# Patient Record
Sex: FEMALE | Race: WHITE | NOT HISPANIC OR LATINO | Employment: FULL TIME | URBAN - METROPOLITAN AREA
[De-identification: names, ages, dates, MRNs, and addresses within clinical notes are randomized per-mention and may not be internally consistent; named-entity substitution may affect disease eponyms.]

---

## 2022-01-13 ENCOUNTER — VIRTUAL VISIT (OUTPATIENT)
Dept: FAMILY MEDICINE | Facility: CLINIC | Age: 27
End: 2022-01-13
Payer: COMMERCIAL

## 2022-01-13 DIAGNOSIS — Z76.89 ENCOUNTER TO ESTABLISH CARE: ICD-10-CM

## 2022-01-13 DIAGNOSIS — J45.30 MILD PERSISTENT ASTHMA WITHOUT COMPLICATION: ICD-10-CM

## 2022-01-13 DIAGNOSIS — G47.00 INSOMNIA, UNSPECIFIED TYPE: ICD-10-CM

## 2022-01-13 DIAGNOSIS — F32.A DEPRESSION, UNSPECIFIED DEPRESSION TYPE: ICD-10-CM

## 2022-01-13 DIAGNOSIS — M54.42 MIDLINE LOW BACK PAIN WITH LEFT-SIDED SCIATICA, UNSPECIFIED CHRONICITY: Primary | ICD-10-CM

## 2022-01-13 PROCEDURE — 99203 OFFICE O/P NEW LOW 30 MIN: CPT | Mod: 95 | Performed by: FAMILY MEDICINE

## 2022-01-13 RX ORDER — ALBUTEROL SULFATE 90 UG/1
2 AEROSOL, METERED RESPIRATORY (INHALATION) EVERY 6 HOURS
COMMUNITY
End: 2022-01-13

## 2022-01-13 RX ORDER — TRAZODONE HYDROCHLORIDE 50 MG/1
1 TABLET, FILM COATED ORAL
COMMUNITY
End: 2022-01-13

## 2022-01-13 RX ORDER — MONTELUKAST SODIUM 10 MG/1
1 TABLET ORAL DAILY
COMMUNITY
End: 2022-01-13

## 2022-01-13 RX ORDER — CITALOPRAM HYDROBROMIDE 20 MG/1
1 TABLET ORAL DAILY
COMMUNITY
End: 2022-01-13

## 2022-01-13 ASSESSMENT — ANXIETY QUESTIONNAIRES
7. FEELING AFRAID AS IF SOMETHING AWFUL MIGHT HAPPEN: NOT AT ALL
1. FEELING NERVOUS, ANXIOUS, OR ON EDGE: NOT AT ALL
2. NOT BEING ABLE TO STOP OR CONTROL WORRYING: NOT AT ALL
6. BECOMING EASILY ANNOYED OR IRRITABLE: NOT AT ALL
3. WORRYING TOO MUCH ABOUT DIFFERENT THINGS: NOT AT ALL
5. BEING SO RESTLESS THAT IT IS HARD TO SIT STILL: NOT AT ALL
IF YOU CHECKED OFF ANY PROBLEMS ON THIS QUESTIONNAIRE, HOW DIFFICULT HAVE THESE PROBLEMS MADE IT FOR YOU TO DO YOUR WORK, TAKE CARE OF THINGS AT HOME, OR GET ALONG WITH OTHER PEOPLE: NOT DIFFICULT AT ALL
GAD7 TOTAL SCORE: 0

## 2022-01-13 ASSESSMENT — PATIENT HEALTH QUESTIONNAIRE - PHQ9
SUM OF ALL RESPONSES TO PHQ QUESTIONS 1-9: 0
5. POOR APPETITE OR OVEREATING: NOT AT ALL

## 2022-01-13 NOTE — PROGRESS NOTES
Ailin is a 26 year old who is being evaluated via a billable video visit.      How would you like to obtain your AVS? MyChart  If the video visit is dropped, the invitation should be resent by: Text to cell phone: [phone]  Will anyone else be joining your video visit? No    Video Start Time: Not recorded    Assessment & Plan     ICD-10-CM    1. Midline low back pain with left-sided sciatica, unspecified chronicity  M54.42 cyclobenzaprinetaminophen (FLEXERIL) 10 MG ED starter pack     Physical Therapy Referral   2. Mild persistent asthma without complication  J45.30 montelukast (SINGULAIR) 10 MG tablet     albuterol (PROAIR HFA/PROVENTIL HFA/VENTOLIN HFA) 108 (90 Base) MCG/ACT inhaler   3. Depression, unspecified depression type  F32.A citalopram (CELEXA) 20 MG tablet     traZODone (DESYREL) 50 MG tablet   4. Insomnia, unspecified type  G47.00 traZODone (DESYREL) 50 MG tablet   5. Encounter to establish care  Z76.89      Medical history making: Patient is here today to establish care.  [facility] the [facility]  She had insurance under maiden name so records are not available in Care Everywhere.  She needs refill of her medication.  She uses Flexeril sparingly for upper back pain which she calls a stiff upper back muscle spasm.  Discussed the nature of muscle relaxant and possible dependence.  Limited refills are given.  However, would like her to establish physical therapy and work on back strengthening exercises and he is agreeable and referral has been done.  Regarding her asthma, she uses albuterol once or twice a week.  She recalls me that Flovent has never helped her.  Discussed that if she continues to use albuterol regularly, need to be your maintenance medication and we can try Advair or other combination.  She is agreeable.  Currently she feels well regarding her depression and has no acute concerns.  She feels stable on Celexa and would like to continue and this is being  refilled for her.         MEDICATIONS:  Continue current medications without change  See Patient Instructions    Return in about 6 months (around 7/13/2022) for Follow up, Routine preventive.    Nilton Pantoja MD  [facility]    Subjective    Chief Complaint   Patient presents with     Establish Care     Back Pain     Hx of sciatica.  Has been having an increase in a burning in back and down both legs.  She does wear a back brace with some relief.  Also does stretching, sleeps with a pillow between her legs.  Is there anything else that can be done?        Ailin is a 26 year old who presents for the following health issues     No past medical history on file.  No past surgical history on file.      Review of Systems   Constitutional, HEENT, cardiovascular, pulmonary, gi and gu systems are negative, except as otherwise noted.      Objective           Vitals:  No vitals were obtained today due to virtual visit.    Physical Exam   GENERAL: Healthy, alert and no distress  EYES: Eyes grossly normal to inspection.  No discharge or erythema, or obvious scleral/conjunctival abnormalities.  RESP: No audible wheeze, cough, or visible cyanosis.  No visible retractions or increased work of breathing.    SKIN: Visible skin clear. No significant rash, abnormal pigmentation or lesions.  NEURO: Cranial nerves grossly intact.  Mentation and speech appropriate for age.  PSYCH: Mentation appears normal, affect normal/bright, judgement and insight intact, normal speech and appearance well-groomed.    Will [facility] records from [facility]        Video-Visit Details    Type of service:  Video Visit    Video End Time:not recorded    Originating Location (pt. Location): Home    Distant Location (provider location):  [facility]     Platform used for Video Visit: Digital Lifeboat

## 2022-01-14 ASSESSMENT — ANXIETY QUESTIONNAIRES: GAD7 TOTAL SCORE: 0

## 2022-01-16 PROBLEM — F32.A DEPRESSION, UNSPECIFIED DEPRESSION TYPE: Status: ACTIVE | Noted: 2022-01-16

## 2022-01-16 PROBLEM — M54.42 MIDLINE LOW BACK PAIN WITH LEFT-SIDED SCIATICA, UNSPECIFIED CHRONICITY: Status: ACTIVE | Noted: 2022-01-16

## 2022-01-16 PROBLEM — G47.00 INSOMNIA, UNSPECIFIED TYPE: Status: ACTIVE | Noted: 2022-01-16

## 2022-01-16 RX ORDER — CITALOPRAM HYDROBROMIDE 20 MG/1
20 TABLET ORAL DAILY
Qty: 90 TABLET | Refills: 3 | Status: SHIPPED | OUTPATIENT
Start: 2022-01-16 | End: 2022-03-23

## 2022-01-16 RX ORDER — TRAZODONE HYDROCHLORIDE 50 MG/1
50 TABLET, FILM COATED ORAL
Qty: 90 TABLET | Refills: 3 | Status: SHIPPED | OUTPATIENT
Start: 2022-01-16 | End: 2022-03-23

## 2022-01-16 RX ORDER — ALBUTEROL SULFATE 90 UG/1
2 AEROSOL, METERED RESPIRATORY (INHALATION) EVERY 6 HOURS
Qty: 18 G | Refills: 3 | Status: SHIPPED | OUTPATIENT
Start: 2022-01-16 | End: 2022-10-12

## 2022-01-16 RX ORDER — MONTELUKAST SODIUM 10 MG/1
1 TABLET ORAL DAILY
Qty: 90 TABLET | Refills: 3 | Status: SHIPPED | OUTPATIENT
Start: 2022-01-16 | End: 2022-03-23

## 2022-01-17 NOTE — PATIENT INSTRUCTIONS
Patient Education     Asthma  What is asthma?  Asthma is a long-term (chronic) ?lung disease. The airways react to triggers (allergens and irritants). This makes it hard to breathe. With exposure to triggers, changes can occur such as:     The airways become swollen and inflamed.    The muscles around the airways tighten.    More mucus is made. This leads to mucus plugs.  All of these changes make the airways narrow. This makes it hard for air to go out of the lungs. And fresh oxygen can't get into the body.   What causes asthma?  Experts don't know the exact cause of asthma. They believe it is partly inherited. The environment, infections, and chemicals released by the body also play a role.   Exercise causes symptoms in many people with asthma. Symptoms can occur during exercise. They can also occur right after exercise. In some people, stress or strong feelings can cause symptoms.   All of these may be asthma triggers:   Allergens Respiratory problem         Pollens (trees, grasses, and weeds)    Mold    Pets    Dust and dust mites    Cockroaches    Mice       Nasal allergies    Sinus infections    The flu    Viral infections, including the common cold   Irritants Medicines         Strong odors from perfumes, , cooking, paints, and varnishes    Chemicals (gases, fumes)    Air pollution    Changing weather (temperature, barometric pressure, humidity, and strong winds)    Smoke (tobacco-inhaled or secondhand)       Aspirin    NSAIDs (nonsteroidal anti-inflammatory drugs) such as ibuprofen   Other conditions Other         GERD (gastroesophageal reflux)    Sleep apnea    Overweight    Depression       Exercise, especially in cold weather    Strong feelings that go along with laughing or crying       Who is at risk for asthma?  It is most common in:     Children and teens ages 5 to17    People living in cities  Other factors include:    Personal or family history of asthma or allergies    Exposure to  secondhand tobacco smoke    Children with a family history of asthma    Children who have allergies or atopic dermatitis    Children exposed to secondhand and tobacco smoke    Living in areas with high levels of environmental air pollution  What are the symptoms of asthma?  Symptoms include:    Trouble breathing or shortness of breath    Chest tightness    Wheezing or a whistling sound when breathing    Coughing    Breathing becomes harder and may hurt    Talking and sleeping may be harder with severe symptoms  How is asthma diagnosed?  Your healthcare provider will ask about your health history. They will give you a physical exam. You will also have other tests. An important test is spirometry.   A spirometer is a device used to find out how well the lungs are working. It measures the amount and speed of air breathed out.   You may have other tests. These are done to check for conditions such as allergies.   How is asthma treated?  Treatment will depend on your symptoms, age, and general health. It will also depend on how severe the condition is.   There is no cure for asthma. It can often be controlled by staying away from triggers. And by taking medicines as prescribed by your healthcare provider.   Watching for symptoms and taking early, appropriate action is a key part of asthma care. So is knowing what to do if symptoms get worse. Experts advise making an Asthma Action Plan with your provider and educating your family and close friends about its purpose and content. This will help them provide correct asthma care if you are ill and can't care for yourself.   Medicines for asthma  The 2 types of asthma medicines are long-term control and short-term (quick-relief) medicines. Long-term control medicines are often taken every day. They help prevent symptoms. Quick-relief medicines calm asthma symptoms fast. But they only last for a short time. You may take either type of medicine alone. Some people take both.    Your healthcare provider should regularly check and adjust your medicines as needed.   Long-term control medicines  At first, it may take a few weeks for long-term control medicines to work. You must take these medicines every day. These medicines include:     Anti-inflammatory medicines. These medicines reduce or prevent airway swelling.    Bronchodilators. These relax muscles around the airways.    Leukotriene modifiers. These block the action of chemicals called leukotrienes. These are chemicals that cause airways to be inflamed and narrowed.     Biologic therapy. For people with asthma that isn't well controlled despite inhaler therapy, there are some newer medicines. These target the inflammatory cells in the body that start the asthma reaction. These medicines include anti-IL4, Anti-IL5, and anti-IgE medicines. They are often given by shot (injection) or infusion.  Quick-relief medicines  Quick-relief medicines quickly relax the muscles around the airways. But the relief only lasts about 2 to 3 hours.   These medicines may include:    Inhaled short-acting beta2-agonists .  These help relax muscles around the airways.    Inhaled anticholinergics . These block a chemical in the body called acetylcholine. This chemical contracts the muscles. It also causes more mucus in the airways.  Inhalation devices for asthma  Inhaled medicines go right to the lungs. They have fewer side effects than medicines taken by mouth. Inhaled medicines may be anti-inflammatory or bronchodilating. Or they may be both. The devices used are:     Metered-dose inhaler (MDI). This is the most common type of inhaler. It uses a chemical to push the medicine out of the inhaler. MDIs are held in front of or put into the mouth. Then the medicine is released in puffs. Or they may be used with a spacer device.    Nebulizer. This device sprays a fine mist of medicine. This is done through a mask using air under pressure, or an ultrasonic machine.  A mouthpiece or mask is connected to a machine by plastic tubing to deliver medicine.    Dry powder or rotary inhaler.  These inhalers deliver powered medicine as you breathe.  Living with asthma  Staying away from triggers is key in managing asthma. Triggers are specific to the individual and may include such things as allergens, irritants, other health problems, exercise, medicines, and strong emotions. The following can help you limit your exposure:   Allergies    Dust. Dust is the most common year-round allergen. The allergy is caused by tiny dust mites. Dust mites are found in mattresses, carpets, and fabric-covered (upholstered) furniture such as sofas and chairs. They live best in warm, humid conditions. It's important to limit your exposure. Keep your living area as clean as possible by vacuuming and dusting on a weekly basis. Keep the use of carpets to a minimum, and take extra care in the bedroom. Put dust mite covers on your mattress, box spring, and pillows.    Pollens. You may be allergic to pollen. If so, during pollen season keep all car and house windows closed. Use air conditioning. If you have been outside, shower, wash your hair, and change clothes when you go inside.     Pets. Pets that have fur or feathers often cause allergies. If you have pets, try not to touch them. If you do pet or handle them, wash your hands afterward. Keep pets off your furniture, bed, and out of your bedroom. Have someone brush and bathe your pet often.    Mold and mildew.  These can trigger asthma. When outside, stay away from damp, shady areas. Use exhaust fans when cooking or bathing. Keep indoor humidity below 45%. And drain and clean your dehumidifier often.    Exercise  Exercise is a common asthma trigger. But don't limit sports or exercise unless a healthcare provider tells you to. Exercise is good for your health and lungs. Swimming, golf, and karate are good choices if you have asthma. Always warm up before  exercise. And cool down after. Ask your provider about using your quick-relief medicine before starting exercise. Keep a log of what types of exercise trigger asthma problems and talk with your provider about possible ways to manage your symptoms.   Irritants  If you smoke, quit. This is hard to do, but your provider can help provide resources to aid your success.   Stay away from secondhand and thirdhand smoke. Don't let people smoke in your car or in your home.   In addition, stay away from other types of smoke. Don t use wood stoves or kerosene heaters. If you live in an area with air pollution, stay indoors during bad air days and wear a mask if you have to go outside. Also stay away from strong perfumes, cleaning products, fresh paint, and other things with strong odors.   Medicines  Some medicines can make asthma symptoms worse. These medicines include aspirin, NSAIDs (nonsteroidal anti-inflammatory drugs), and beta-blockers. Talk with your provider about your asthma history and medicine use.   Other health problems  Some health problems can make it harder to control asthma. These include:     Respiratory infections such as colds and the flu    GERD (gastroesophageal reflux) and heartburn    Being overweight    Sleep apnea    Depression    Work with your healthcare provider to treat any of these problems.   Strong emotions  The strong feelings that go with laughing and crying can trigger asthma symptoms. You can learn how to better manage your emotions.   Key points about asthma    Asthma is a long-term (chronic) lung disease.    Triggers irritate sensitive airways. This makes it hard to breathe.    Staying away from triggers is an important part of treatment.    Long-term medicines control symptoms. They are taken every day, even when you feel well.    Rescue medicines provide quick symptom relief. But they are short-term.    An Asthma Action Plan can help patients and family members take appropriate, timely  steps to control symptoms.    Next steps  Tips to help you get the most from a visit to your healthcare provider:    Know the reason for your visit and what you want to happen.    Before your visit, write down questions you want answered.    Bring someone with you to help you ask questions and remember what your provider tells you.    At the visit, write down the name of a new diagnosis, and any new medicines, treatments, or tests. Also write down any new instructions your provider gives you.    Know why a new medicine or treatment is prescribed, and how it will help you. Also know what the side effects are.    Ask if your condition can be treated in other ways.    Know why a test or procedure is recommended and what the results could mean.    Know what to expect if you do not take the medicine or have the test or procedure.    If you have a follow-up appointment, write down the date, time, and purpose for that visit.    Know how you can contact your provider if you have questions.  [employer] last reviewed this educational content on 12/1/2020 2000-2021 [employer]. All rights reserved. This information is not intended as a substitute for professional medical care. Always follow your healthcare professional's instructions.

## 2022-01-24 ENCOUNTER — HOSPITAL ENCOUNTER (OUTPATIENT)
Dept: PHYSICAL THERAPY | Facility: REHABILITATION | Age: 27
End: 2022-01-24
Attending: FAMILY MEDICINE
Payer: COMMERCIAL

## 2022-01-24 DIAGNOSIS — M54.42 MIDLINE LOW BACK PAIN WITH LEFT-SIDED SCIATICA, UNSPECIFIED CHRONICITY: ICD-10-CM

## 2022-01-24 PROCEDURE — 97161 PT EVAL LOW COMPLEX 20 MIN: CPT | Mod: GP

## 2022-01-24 PROCEDURE — 97110 THERAPEUTIC EXERCISES: CPT | Mod: GP
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
## 2022-01-31 ENCOUNTER — HOSPITAL ENCOUNTER (OUTPATIENT)
Dept: PHYSICAL THERAPY | Facility: REHABILITATION | Age: 27
End: 2022-01-31
Payer: COMMERCIAL

## 2022-01-31 DIAGNOSIS — M54.42 MIDLINE LOW BACK PAIN WITH LEFT-SIDED SCIATICA, UNSPECIFIED CHRONICITY: Primary | ICD-10-CM

## 2022-01-31 PROCEDURE — 97110 THERAPEUTIC EXERCISES: CPT | Mod: GP

## 2022-02-06 ENCOUNTER — HEALTH MAINTENANCE LETTER (OUTPATIENT)
Age: 27
End: 2022-02-06

## 2022-02-07 ENCOUNTER — HOSPITAL ENCOUNTER (OUTPATIENT)
Dept: PHYSICAL THERAPY | Facility: REHABILITATION | Age: 27
End: 2022-02-07
Payer: COMMERCIAL

## 2022-02-07 DIAGNOSIS — M54.42 MIDLINE LOW BACK PAIN WITH LEFT-SIDED SCIATICA, UNSPECIFIED CHRONICITY: Primary | ICD-10-CM

## 2022-02-07 PROCEDURE — 97110 THERAPEUTIC EXERCISES: CPT | Mod: GP

## 2022-02-14 ENCOUNTER — HOSPITAL ENCOUNTER (OUTPATIENT)
Dept: PHYSICAL THERAPY | Facility: REHABILITATION | Age: 27
End: 2022-02-14
Payer: COMMERCIAL

## 2022-02-14 ENCOUNTER — VIRTUAL VISIT (OUTPATIENT)
Dept: FAMILY MEDICINE | Facility: CLINIC | Age: 27
End: 2022-02-14
Payer: COMMERCIAL

## 2022-02-14 DIAGNOSIS — K21.00 GASTROESOPHAGEAL REFLUX DISEASE WITH ESOPHAGITIS, UNSPECIFIED WHETHER HEMORRHAGE: Primary | ICD-10-CM

## 2022-02-14 DIAGNOSIS — M54.42 MIDLINE LOW BACK PAIN WITH LEFT-SIDED SCIATICA, UNSPECIFIED CHRONICITY: Primary | ICD-10-CM

## 2022-02-14 PROCEDURE — 97110 THERAPEUTIC EXERCISES: CPT | Mod: GP

## 2022-02-14 PROCEDURE — 99213 OFFICE O/P EST LOW 20 MIN: CPT | Mod: 95 | Performed by: FAMILY MEDICINE

## 2022-02-14 ASSESSMENT — ASTHMA QUESTIONNAIRES
QUESTION_2 LAST FOUR WEEKS HOW OFTEN HAVE YOU HAD SHORTNESS OF BREATH: NOT AT ALL
QUESTION_3 LAST FOUR WEEKS HOW OFTEN DID YOUR ASTHMA SYMPTOMS (WHEEZING, COUGHING, SHORTNESS OF BREATH, CHEST TIGHTNESS OR PAIN) WAKE YOU UP AT NIGHT OR EARLIER THAN USUAL IN THE MORNING: NOT AT ALL
ACT_TOTALSCORE: 25
QUESTION_4 LAST FOUR WEEKS HOW OFTEN HAVE YOU USED YOUR RESCUE INHALER OR NEBULIZER MEDICATION (SUCH AS ALBUTEROL): NOT AT ALL
QUESTION_1 LAST FOUR WEEKS HOW MUCH OF THE TIME DID YOUR ASTHMA KEEP YOU FROM GETTING AS MUCH DONE AT WORK, SCHOOL OR AT HOME: NONE OF THE TIME
QUESTION_5 LAST FOUR WEEKS HOW WOULD YOU RATE YOUR ASTHMA CONTROL: COMPLETELY CONTROLLED
ACT_TOTALSCORE: 25

## 2022-02-14 NOTE — PROGRESS NOTES
Ailin is a 26 year old who is being evaluated via a billable video visit.      How would you like to obtain your AVS? [employer]  If the video visit is dropped, the invitation should be resent by: Text to cell phone: [phone]  Will anyone else be joining your video visit? No      Video Start Time: 2:26 PM    Assessment & Plan     ICD-10-CM    1. Gastroesophageal reflux disease with esophagitis, unspecified whether hemorrhage  K21.00 omeprazole (PRILOSEC) 20 MG DR capsule     Medical history making: Patient here today for refill for omeprazole.  She informs me that it was started about a year ago amidst the pandemic.  This was at [facility].  She informs me that she takes 40 mg.  She has not had omeprazole for some time is now having symptoms of gas treat reflux.  She denies using any see medication.  Triggers are not known.  Time spent discussing reflux triggers, treatment and further evaluation.  Discussed starting at 20 mg and if symptoms are persisting or worsening, further evaluation needs to be done.  This may include endoscopy.  Patient education material sent via [employer].  Reminded patient that we are [facility] to monitor [email or web address].  Care gaps indicate that she needs health maintenance along with Pap smear and other immunization update.  Patient will try to send her medical records to us so that this can be updated.    956}     MEDICATIONS:   Orders Placed This Encounter   Medications     omeprazole (PRILOSEC) 20 MG DR capsule     Sig: Take 1 capsule (20 mg) by mouth daily     Dispense:  30 capsule     Refill:  3          - Continue other medications without change  See Patient Instructions    Return for Routine preventive.    Nilton Pantoja MD  [facility]    Subjective    Chief Complaint   Patient presents with     Medication Therapy Management     Needs refill on omprazole        Ailin is a 26 year old who presents for the  following health issues     History of Present Illness       She eats 2-3 servings of fruits and vegetables daily.She consumes 1 sweetened beverage(s) daily.        Patient Active Problem List   Diagnosis     Mild persistent asthma without complication     Midline low back pain with left-sided sciatica, unspecified chronicity     Depression, unspecified depression type     Insomnia, unspecified type     Current Outpatient Medications   Medication     omeprazole (PRILOSEC) 20 MG DR capsule     albuterol (PROAIR HFA/PROVENTIL HFA/VENTOLIN HFA) 108 (90 Base) MCG/ACT inhaler     citalopram (CELEXA) 20 MG tablet     cyclobenzaprinetaminophen (FLEXERIL) 10 MG ED starter pack     montelukast (SINGULAIR) 10 MG tablet     traZODone (DESYREL) 50 MG tablet     No current facility-administered medications for this visit.         Review of Systems   Constitutional, HEENT, cardiovascular, pulmonary, gi and gu systems are negative, except as otherwise noted.      Objective           Vitals:  No vitals were obtained today due to virtual visit.    Physical Exam   GENERAL: Healthy, alert and no distress  EYES: Eyes grossly normal to inspection.  No discharge or erythema, or obvious scleral/conjunctival abnormalities.  RESP: No audible wheeze, cough, or visible cyanosis.  No visible retractions or increased work of breathing.    SKIN: Visible skin clear. No significant rash, abnormal pigmentation or lesions.  NEURO: Cranial nerves grossly intact.  Mentation and speech appropriate for age.  PSYCH: Mentation appears normal, affect normal/bright, judgement and insight intact, normal speech and appearance well-groomed.        Video-Visit Details    Type of service:  Video Visit    Video End Time:2:33 PM    Originating Location (pt. Location): Home    Distant Location (provider location):  [facility]     Platform used for Video Visit: NextGen Platform

## 2022-02-14 NOTE — PATIENT INSTRUCTIONS
Patient Education     GERD (Adult)    The esophagus is a tube that carries food from the mouth to the stomach. A valve (the LES, lower esophageal sphincter) at the lower end of the esophagus prevents stomach acid from flowing upward. When this valve doesn't work properly, stomach contents may repeatedly flow back up (reflux) into the esophagus. This is called gastroesophageal reflux disease (GERD). GERD can irritate the esophagus. It can cause problems with pain, swallowing or breathing. In severe cases, GERD can cause recurrent pneumonia (from aspiration or breathing in particles) or other serious problems.  Symptoms of reflux include burning, pressure or sharp pain in the upper abdomen or mid to lower chest. The pain can spread to the neck, back, or shoulder. There may be belching, an acid taste in the back of the throat, chronic cough, or sore throat, or hoarseness. GERD symptoms often occur during the day after a big meal. They can also occur at night when lying down.   Home care  Lifestyle changes can help reduce symptoms. If needed, your healthcare provider may prescribe medicines. Symptoms often improve with treatment, but if treatment is stopped, the symptoms often return after a few months. So most persons with GERD will need to continue treatment or get treatment on and off.  Lifestyle changes    Limit or avoid fatty, fried, and spicy foods, as well as coffee, chocolate, mint, and foods with high acid content such as tomatoes and citrus fruit and juices (orange, grapefruit, lemon).    Don t eat large meals, especially at night. Frequent, smaller meals are best. Don't lie down right after eating. And don t eat anything 3 hours before going to bed.    Don't drink alcohol or smoke. As much as possible, stay away from second hand smoke.    If you are overweight, losing weight will reduce symptoms.     Don't wear tight clothing around your stomach area.    If your symptoms occur during sleep, use a foam wedge  "to elevate your upper body (not just your head.) Or, place 4\" blocks under the head of your bed. Or use 2 bed risers under your bedframe.  Medicines  If needed, medicines can help relieve the symptoms of GERD and prevent damage to the esophagus. Discuss a medicine plan with your healthcare provider. This may include one or more of the following medicines:    Antacids to help neutralize the normal acids in your stomach.    Acid blockers (Histamine or H2 blockers) to decrease acid production.    Acid inhibitors (proton pump inhibitors PPIs) to decrease acid production in a different way than the blockers. They may work better, but can take a little longer to take effect.  Take an antacid 30 to 60 minutes after eating and at bedtime, but not at the same time as an acid blocker.  Try not to take medicines such as ibuprofen and aspirin. If you are taking aspirin for your heart or other medical reasons, talk to your healthcare provider about stopping it.  Follow-up care  Follow up with your healthcare provider or as advised by our staff.  When to seek medical advice  Call your healthcare provider if any of the following occur:    Stomach pain gets worse or moves to the lower right abdomen (appendix area)    Chest pain appears or gets worse, or spreads to the back, neck, shoulder, or arm    An over-the-counter trial of medicine doesn't relieve your symptoms    Weight loss that can't be explained    Trouble or pain swallowing    Frequent vomiting (can t keep down liquids)    Blood in the stool or vomit (red or black in color)    Feeling weak or dizzy    Fever of 100.4 F (38 C) or higher, or as directed by your healthcare provider  [employer] last reviewed this educational content on 3/1/2018    [id number] [employer]. All rights reserved. This information is not intended as a substitute for professional medical care. Always follow your healthcare professional's instructions.           "

## 2022-03-09 ENCOUNTER — HOSPITAL ENCOUNTER (OUTPATIENT)
Dept: PHYSICAL THERAPY | Facility: REHABILITATION | Age: 27
End: 2022-03-09
Payer: COMMERCIAL

## 2022-03-09 DIAGNOSIS — M54.42 MIDLINE LOW BACK PAIN WITH LEFT-SIDED SCIATICA, UNSPECIFIED CHRONICITY: Primary | ICD-10-CM

## 2022-03-09 PROCEDURE — 97110 THERAPEUTIC EXERCISES: CPT | Mod: GP

## 2022-03-23 ENCOUNTER — OFFICE VISIT (OUTPATIENT)
Dept: FAMILY MEDICINE | Facility: CLINIC | Age: 27
End: 2022-03-23
Payer: COMMERCIAL

## 2022-03-23 VITALS
HEIGHT: 72 IN | DIASTOLIC BLOOD PRESSURE: 80 MMHG | WEIGHT: 258.5 LBS | HEART RATE: 80 BPM | SYSTOLIC BLOOD PRESSURE: 128 MMHG | BODY MASS INDEX: 35.01 KG/M2

## 2022-03-23 DIAGNOSIS — Z00.00 ENCOUNTER FOR ROUTINE HISTORY AND PHYSICAL EXAM IN FEMALE: Primary | ICD-10-CM

## 2022-03-23 DIAGNOSIS — N89.8 VAGINAL DISCHARGE: ICD-10-CM

## 2022-03-23 DIAGNOSIS — R30.0 DYSURIA: ICD-10-CM

## 2022-03-23 DIAGNOSIS — F32.A DEPRESSION, UNSPECIFIED DEPRESSION TYPE: ICD-10-CM

## 2022-03-23 DIAGNOSIS — Z13.1 DIABETES MELLITUS SCREENING: ICD-10-CM

## 2022-03-23 DIAGNOSIS — N91.2 AMENORRHEA: ICD-10-CM

## 2022-03-23 DIAGNOSIS — Z13.220 LIPID SCREENING: ICD-10-CM

## 2022-03-23 DIAGNOSIS — Z12.4 CERVICAL CANCER SCREENING: ICD-10-CM

## 2022-03-23 LAB
ALBUMIN UR-MCNC: NEGATIVE MG/DL
APPEARANCE UR: CLEAR
BILIRUB UR QL STRIP: NEGATIVE
CHOLEST SERPL-MCNC: 148 MG/DL
CLUE CELLS: ABNORMAL
COLOR UR AUTO: YELLOW
FASTING STATUS PATIENT QL REPORTED: ABNORMAL
FASTING STATUS PATIENT QL REPORTED: NORMAL
GLUCOSE BLD-MCNC: 74 MG/DL (ref 70–125)
GLUCOSE UR STRIP-MCNC: NEGATIVE MG/DL
HCG SERPL-ACNC: 112 MLU/ML (ref 0–4)
HCG UR QL: NEGATIVE
HDLC SERPL-MCNC: 41 MG/DL
HGB BLD-MCNC: 12.1 G/DL (ref 11.7–15.7)
HGB UR QL STRIP: NEGATIVE
KETONES UR STRIP-MCNC: NEGATIVE MG/DL
LDLC SERPL CALC-MCNC: 91 MG/DL
LEUKOCYTE ESTERASE UR QL STRIP: NEGATIVE
NITRATE UR QL: NEGATIVE
PH UR STRIP: 6.5 [PH] (ref 5–8)
SP GR UR STRIP: 1.01 (ref 1–1.03)
TRICHOMONAS, WET PREP: ABNORMAL
TRIGL SERPL-MCNC: 78 MG/DL
UROBILINOGEN UR STRIP-ACNC: 0.2 E.U./DL
WBC'S/HIGH POWER FIELD, WET PREP: ABNORMAL
YEAST, WET PREP: ABNORMAL

## 2022-03-23 PROCEDURE — 36415 COLL VENOUS BLD VENIPUNCTURE: CPT | Performed by: NURSE PRACTITIONER

## 2022-03-23 PROCEDURE — G0123 SCREEN CERV/VAG THIN LAYER: HCPCS | Performed by: NURSE PRACTITIONER

## 2022-03-23 PROCEDURE — 99395 PREV VISIT EST AGE 18-39: CPT | Performed by: NURSE PRACTITIONER

## 2022-03-23 PROCEDURE — 82947 ASSAY GLUCOSE BLOOD QUANT: CPT | Performed by: NURSE PRACTITIONER

## 2022-03-23 PROCEDURE — 84702 CHORIONIC GONADOTROPIN TEST: CPT | Performed by: NURSE PRACTITIONER

## 2022-03-23 PROCEDURE — 87210 SMEAR WET MOUNT SALINE/INK: CPT | Performed by: NURSE PRACTITIONER

## 2022-03-23 PROCEDURE — 81025 URINE PREGNANCY TEST: CPT | Performed by: NURSE PRACTITIONER

## 2022-03-23 PROCEDURE — 87624 HPV HI-RISK TYP POOLED RSLT: CPT | Performed by: NURSE PRACTITIONER

## 2022-03-23 PROCEDURE — 99214 OFFICE O/P EST MOD 30 MIN: CPT | Mod: 25 | Performed by: NURSE PRACTITIONER

## 2022-03-23 PROCEDURE — 85018 HEMOGLOBIN: CPT | Performed by: NURSE PRACTITIONER

## 2022-03-23 PROCEDURE — 81003 URINALYSIS AUTO W/O SCOPE: CPT | Performed by: NURSE PRACTITIONER

## 2022-03-23 PROCEDURE — 80061 LIPID PANEL: CPT | Performed by: NURSE PRACTITIONER

## 2022-03-23 RX ORDER — SERTRALINE HYDROCHLORIDE 25 MG/1
25 TABLET, FILM COATED ORAL DAILY
Qty: 90 TABLET | Refills: 3 | Status: SHIPPED | OUTPATIENT
Start: 2022-03-23 | End: 2023-01-12

## 2022-03-23 NOTE — PROGRESS NOTES
Assessment and Plan:      Encounter for routine history and physical exam in female  Recommend consuming a healthy diet and exercising.  She declines HIV and Hepatitis C screening. Will avoid vaccines today as she has had numerous positive at home pregnancy tests.    - Hemoglobin  - Hemoglobin    Cervical cancer screening  - Pap Screen reflex to HPV if ASCUS - recommend age 25 - 29    Diabetes mellitus screening  - Glucose  - Glucose    Lipid screening  - Lipid panel reflex to direct LDL Fasting  - Lipid panel reflex to direct LDL Fasting    Depression, unspecified depression type  She is trying to conceive, will discontinue citalopram.  Will start sertraline 25 mg daily.  Educated on its indications and side effects.  - sertraline (ZOLOFT) 25 MG tablet  Dispense: 90 tablet; Refill: 3    Amenorrhea  Patient had 3 positive at-home pregnancy tests.  Urine pregnancy today is negative.  Will obtain beta-hCG.  I did provide OB handouts for information.  If she is pregnant, she would like to establish OB care with Dr. Tillman.  I encouraged her to start taking a prenatal vitamin.  She is to avoid smoking, alcohol, drugs.  - HCG qualitative urine  - HCG qualitative urine  - HCG quantitative pregnancy  - HCG quantitative pregnancy    Dysuria  Urinalysis is normal.  She is to increase her fluid intake.   - UA Macro with Reflex to Micro and Culture - lab collect  - UA Macro with Reflex to Micro and Culture - lab collect    Vaginal discharge  Wet prep is normal.    - Wet prep - Clinic Collect        Subjective:     Ailin is a 26 year old female presenting to the clinic for a female physical.     LMP: 2/23/22 regular once/month   Hx of abnormal pap smear: yes, one year ago colposcopy which she says was normal   Last pap smear: last year   Perform self-breast exams: yes   Vaginal discharge or irritation: none   Sexually active: yes,  for 1 1/2 years   Contraception: IUD removed in December   Concerns for STDs: none    Previous pregnancies:none     Answers for HPI/ROS submitted by the patient on 3/22/2022  Frequency of exercise:: 1 day/week  Getting at least 3 servings of Calcium per day:: NO  Diet:: Regular (no restrictions)  Taking medications regularly:: Yes  Medication side effects:: None  Bi-annual eye exam:: NO  Dental care twice a year:: NO  Sleep apnea or symptoms of sleep apnea:: None  Additional concerns today:: No  Duration of exercise:: 15-30 minutes    Patient presents today with multiple concerns.  Patient had her IUD removed in December.  Her last menstrual period was on 2/23/2022.  She had a positive at-home pregnancy test Sunday night.  This is her first pregnancy.  She is not taking a prenatal multivitamin.  She denies alcohol, smoking, drugs.  She denies symptoms of pregnancy.  Patient is taking citalopram for anxiety and depression.  She works as an events coordinator and feels anxious when she overthinks situations or has a heavy workload.  Patient has intermittent asthma which flares during season changes.  She uses albuterol once per week.  Lastly, patient is concerned of a possible urinary tract infection.  She complains of dysuria, urinary frequency, low back pain.  This has been ongoing for 1 week.  She denies vaginal discharge or irritation.  She has not had hematuria.  No fever has been present.    Review of systems:  I performed a 10 point review of systems.  All pertinent positives and negatives are noted in the HPI. All others are negative.     Allergies   Allergen Reactions     Aspirin Difficulty breathing, Hives, Itching, Nausea, Nausea and Vomiting and Shortness Of Breath     Ibuprofen Difficulty breathing, Fatigue, Headache, Itching, Nausea, Nausea and Vomiting and Shortness Of Breath       Current Outpatient Medications   Medication     albuterol (PROAIR HFA/PROVENTIL HFA/VENTOLIN HFA) 108 (90 Base) MCG/ACT inhaler     citalopram (CELEXA) 20 MG tablet     cyclobenzaprinetaminophen (FLEXERIL)  "10 MG ED starter pack     montelukast (SINGULAIR) 10 MG tablet     omeprazole (PRILOSEC) 20 MG DR capsule     traZODone (DESYREL) 50 MG tablet     No current facility-administered medications for this visit.       Social History     Socioeconomic History     Marital status:      Spouse name: Not on file     Number of children: Not on file     Years of education: Not on file     Highest education level: Not on file   Occupational History     Not on file   Tobacco Use     Smoking status: Not on file     Smokeless tobacco: Not on file   Substance and Sexual Activity     Alcohol use: Not on file     Drug use: Not on file     Sexual activity: Not on file   Other Topics Concern     Not on file   Social History Narrative     , No children    Nilton Pantoja MD         Social Determinants of Health     Financial Resource Strain: Not on file   Food Insecurity: Not on file   Transportation Needs: Not on file   Physical Activity: Not on file   Stress: Not on file   Social Connections: Not on file   Intimate Partner Violence: Not on file   Housing Stability: Not on file       No past medical history on file.    Family History   Problem Relation Age of Onset     Depression Mother        No past surgical history on file.    Objective:     /80 (BP Location: Right arm, Patient Position: Sitting, Cuff Size: Adult Regular)   Pulse 80   Ht 1.842 m (6' 0.5\")   Wt 117.3 kg (258 lb 8 oz)   BMI 34.58 kg/m      Patient is alert, no obvious distress.   Skin: Warm, dry.  No rashes or lesions. Skin turgor rapid return.   HEENT:  Eyes normal.  Ears normal.  Nose patent, mucosa pink.  Oropharynx mucosa pink, no lesions or tonsil enlargement.   Neck:  Supple, without lymphadenopathy, bruits, JVD. Thyroid normal texture and size.    Lungs:  Clear to auscultation.  No wheezing, rales noted.  Respirations even and unlabored.   Heart:  Regular rate and rhythm.  No murmurs.   Breasts:  Normal.  No surrounding adenopathy. "   Abdomen: Soft, nontender.  No organomegaly.  Bowel sounds normoactive.  No guarding or masses noted.   :  External genitalia normal.  Normal vaginal mucosa. Thick white vaginal discharge is present.  Cervix no lesions or cervical motion tenderness.   Neurological:  Cranial nerves 2-12 intact.

## 2022-03-24 DIAGNOSIS — N91.2 AMENORRHEA: Primary | ICD-10-CM

## 2022-03-24 DIAGNOSIS — Z32.01 PREGNANCY TEST POSITIVE: ICD-10-CM

## 2022-03-30 ENCOUNTER — HOSPITAL ENCOUNTER (OUTPATIENT)
Dept: PHYSICAL THERAPY | Facility: REHABILITATION | Age: 27
Discharge: HOME | End: 2022-03-30
Payer: COMMERCIAL

## 2022-03-30 DIAGNOSIS — M54.42 MIDLINE LOW BACK PAIN WITH LEFT-SIDED SCIATICA, UNSPECIFIED CHRONICITY: Primary | ICD-10-CM

## 2022-03-30 LAB
BKR LAB AP GYN ADEQUACY: NORMAL
BKR LAB AP GYN INTERPRETATION: NORMAL
BKR LAB AP HPV REFLEX: NORMAL
BKR LAB AP PREVIOUS ABNL DX: NORMAL
BKR LAB AP PREVIOUS ABNORMAL: NORMAL
PATH REPORT.COMMENTS IMP SPEC: NORMAL
PATH REPORT.COMMENTS IMP SPEC: NORMAL
PATH REPORT.RELEVANT HX SPEC: NORMAL

## 2022-03-30 PROCEDURE — 97110 THERAPEUTIC EXERCISES: CPT | Mod: GP

## 2022-03-31 LAB
HUMAN PAPILLOMA VIRUS 16 DNA: NEGATIVE
HUMAN PAPILLOMA VIRUS 18 DNA: NEGATIVE
HUMAN PAPILLOMA VIRUS FINAL DIAGNOSIS: NORMAL
HUMAN PAPILLOMA VIRUS OTHER HR: NEGATIVE

## 2022-04-01 ENCOUNTER — PATIENT OUTREACH (OUTPATIENT)
Dept: FAMILY MEDICINE | Facility: CLINIC | Age: 27
End: 2022-04-01
Payer: COMMERCIAL

## 2022-04-08 ENCOUNTER — TELEPHONE (OUTPATIENT)
Dept: FAMILY MEDICINE | Facility: CLINIC | Age: 27
End: 2022-04-08
Payer: COMMERCIAL

## 2022-04-08 DIAGNOSIS — O21.9 NAUSEA AND VOMITING DURING PREGNANCY: Primary | ICD-10-CM

## 2022-04-08 NOTE — TELEPHONE ENCOUNTER
Reason for Call:  Medication or medication refill:    Do you use a [facility]?  Name of the pharmacy and phone number for the current request: [facility], [place] [address]    Name of the medication requested: Anti nausea medication    Other request: Pt called and is really nausea with her pregnancy and is wondering if she can get something to help her please advise thank you    Can we leave a detailed message on this number? YES    Phone number patient can be reached at: Home number on file [phone] (home)    Best Time: anyitme    Call taken on 4/8/2022 at 9:03 AM by Caridad Orr

## 2022-04-08 NOTE — TELEPHONE ENCOUNTER
Nausea in pregnancy:  1. Pt can try over the counter vitamin B12 10mg three times daily as needed plus doxylamine (unisom) 10mg at bedtime   Or  2. We can try prescription zofran 4mg     Both are considered safe in pregnancy. Let me know pt's preference is.    Thanks  Dr Tillman

## 2022-04-11 RX ORDER — ONDANSETRON 4 MG/1
4 TABLET, ORALLY DISINTEGRATING ORAL EVERY 8 HOURS PRN
Qty: 30 TABLET | Refills: 1 | Status: SHIPPED | OUTPATIENT
Start: 2022-04-11 | End: 2022-04-27

## 2022-04-20 ENCOUNTER — HOSPITAL ENCOUNTER (OUTPATIENT)
Dept: PHYSICAL THERAPY | Facility: REHABILITATION | Age: 27
Discharge: HOME | End: 2022-04-20
Payer: COMMERCIAL

## 2022-04-20 DIAGNOSIS — M54.42 MIDLINE LOW BACK PAIN WITH LEFT-SIDED SCIATICA, UNSPECIFIED CHRONICITY: Primary | ICD-10-CM

## 2022-04-20 PROCEDURE — 97110 THERAPEUTIC EXERCISES: CPT | Mod: GP

## 2022-04-20 NOTE — PROGRESS NOTES
[facility]    Outpatient Physical Therapy Discharge Note  Patient: Ailin Calhoun  : 1995    Beginning/End Dates of Reporting Period:  2022 to 2022    Referring Provider: Nilton Pantoja MD    Therapy Diagnosis: Bilateral low back pain with radiating pain/symptoms in BLE     Client Self Report: Pt reports that she is doing well and plans on today being her last PT appointment. Pt reports no numbness/tingling in either leg.    Objective Measurements:  Objective Measure: Lumbar ROM  Details: Flexion, extension, L sidebending, R sidebending, L rotation, R rotation: 100%, no pain  Objective Measure: Oswestry  Details: 0% disability            Outcome Measures (most recent score):  Oswestry: 0% disability    Goals:  Goal Identifier HEP   Goal Description Pt will be independent with HEP to improve mobility and decrease pain.   Target Date 22   Date Met   2022   Progress (detail required for progress note): Goal Met     Goal Identifier Sitting   Goal Description Pt will report ability to sit longer for 30 minutes with less than 5/10 pain to improve functional mobility and quality of life.   Target Date 22   Date Met   2022   Progress (detail required for progress note): Goal Met     Goal Identifier Standing   Goal Description Pt will report ability to stand longer than 1 hour with less than 5/10 pain to improve functional mobility and improve quality of life.   Target Date 22   Date Met   2022   Progress (detail required for progress note): Goal Met         Plan:  Discharge from therapy.    Discharge:    Reason for Discharge: Patient has met all goals.  Patient chooses to discontinue therapy.    Equipment Issued: none    Discharge Plan: Patient to continue home program.  Pt to receive new PT referral from doctor to schedule more PT appointments.       22  [id number]   Signing Clinician's Name / Credentials   Signing clinician's name / credentials Payton Bonner, PT, DPT   Session Number   Session Number 7/8   Adult Goals   PT Ortho Eval Goals 1;2;3   Ortho Goal 1   Goal Identifier HEP   Goal Description Pt will be independent with HEP to improve mobility and decrease pain.   Goal Progress Goal Met   Target Date 04/23/22   Ortho Goal 2   Goal Identifier Sitting   Goal Description Pt will report ability to sit longer for 30 minutes with less than 5/10 pain to improve functional mobility and quality of life.   Goal Progress Goal Met   Target Date 04/23/22   Ortho Goal 3   Goal Identifier Standing   Goal Description Pt will report ability to stand longer than 1 hour with less than 5/10 pain to improve functional mobility and improve quality of life.   Goal Progress Goal Met   Target Date 04/23/22   Subjective Report   Subjective Report Pt reports that she is doing well and plans on today being her last PT appointment. Pt reports no numbness/tingling in either leg.   Objective Measures   Objective Measures Objective Measure 1;Objective Measure 2   Objective Measure 1   Objective Measure Lumbar ROM   Details Flexion, extension, L sidebending, R sidebending, L rotation, R rotation: 100%, no pain   Objective Measure 2   Objective Measure Oswestry   Details 0% disability   Treatment Interventions   Interventions Therapeutic Procedure/Exercise   Therapeutic Procedure/exercise   Therapeutic Procedures: strength, endurance, ROM, flexibillity minutes ([id number]) 42   Skilled Intervention Education and guidance through therapeutic exercises and stretches. Verbal cues, tactile cues, and demonstration on correct technique with exercises. Pt instructed to complete exercises within pain and to stop exercises if symptoms worsen.   Patient Response Tolerated well, no reports of increase in pain   Treatment Detail NuStep lvl 7 x 8 minutes, shoulder theraband rows x lvl 3 theraband x 10, standing hip  abduction/adduction/flexion/extension x lvl 3 theraband x 10 each bilaterally, functional lunges on bosu ball x 10, bridges with ball squeeze x 10, bridges with single leg raise x 10, supine lumbar hip roll x 30 second holds, quadratus lumborum stretch x 30 second holds, piriformis stretch x 30 second holds, nerve glide in hamstring position x 10   Education   Learner Patient   Readiness Eager;Acceptance   Method Booklet/handout;Demonstration   Response Verbalizes Understanding;Demonstrates Understanding   Plan   Homework PTRx   Home program Prone press-ups, supine lumbar hip roll, quadratus lumborum stretch, seated piriformis stretch, pretzel stretch, abdominal brace transverse abdominis, seated hamstring stretch, nerve glide in hamstring position, bridges, clamshells, ball massage, clamshells with theraband, reverse clamshells, SKTC, DKTC, roll in/roll out hookying, cat-cow, monster walks, side stepping, lunges, step ups, wall sit, dead bug   Plan for next session Discharge PT   Comments   Comments Pt presents to PT with increased ROM, increased strength, and decreased pain and reports improvement in functional mobility and increased tolerance completing ADL's, recreational activities, and work related tasks.   Total Session Time   Timed Code Treatment Minutes 42   Total Treatment Time (sum of timed and untimed services) 42   AMBULATORY CLINICS ONLY-MEDICAL AND TREATMENT DIAGNOSIS   Medical Diagnosis Midline low back pain with left-sided sciatica, unspecified chronicity   PT Diagnosis bilateral low back pain with radiating pain/symptoms in BLE       Payton Bonner, PT, DPT

## 2022-04-25 ENCOUNTER — HOSPITAL ENCOUNTER (OUTPATIENT)
Dept: ULTRASOUND IMAGING | Facility: CLINIC | Age: 27
Discharge: HOME | End: 2022-04-25
Attending: NURSE PRACTITIONER | Admitting: NURSE PRACTITIONER
Payer: COMMERCIAL

## 2022-04-25 DIAGNOSIS — N91.2 AMENORRHEA: ICD-10-CM

## 2022-04-25 DIAGNOSIS — Z32.01 PREGNANCY TEST POSITIVE: ICD-10-CM

## 2022-04-25 PROCEDURE — 76801 OB US < 14 WKS SINGLE FETUS: CPT

## 2022-04-27 DIAGNOSIS — O21.9 NAUSEA AND VOMITING DURING PREGNANCY: ICD-10-CM

## 2022-04-27 RX ORDER — ONDANSETRON 4 MG/1
4 TABLET, ORALLY DISINTEGRATING ORAL EVERY 8 HOURS PRN
Qty: 30 TABLET | Refills: 1 | Status: SHIPPED | OUTPATIENT
Start: 2022-04-27 | End: 2022-05-27

## 2022-04-27 NOTE — TELEPHONE ENCOUNTER
Pt is calling for a refill of her Zofran. Pt states that pharmacy is only dispensing 9 tabs at a time to her and she has already  3-4 times. Please send rx to pharmacy and patient would like to get a 30 day supply.     Informed her that it was being sent over at 30 tabs dispensed but for some reason they will only give her 9 at a time.

## 2022-04-28 ENCOUNTER — TELEPHONE (OUTPATIENT)
Dept: FAMILY MEDICINE | Facility: CLINIC | Age: 27
End: 2022-04-28
Payer: COMMERCIAL

## 2022-04-28 NOTE — TELEPHONE ENCOUNTER
----- Message from ARACELIS Serrano CNP sent at 4/27/2022  5:07 PM CDT -----  Please notify the patient that her ultrasound is showing that she was 9 weeks along at the time of the ultrasound.  Her estimated due date was 11/20/22.  Please assist her with scheduling her first OB with Dr. Tillman. Thanks.

## 2022-04-29 NOTE — PATIENT INSTRUCTIONS
Ask if you can either get more zofran at another pharmacy (call another pharmacy - [facility], [facility]) or if you could buy out of pocket the rest of the pills/supply    Tips to Relieve Nausea  Although nausea can occur at any time of the day, it may be worse in the morning. To help prevent nausea:  Eat small, light meals at frequent intervals.  Get up slowly. Eat a few unsalted crackers before you get out of bed.  Drink water or 7-Up to soothe your stomach.  Eat an ice pop in your favorite flavor.  Ask your health care provider about taking annabelle or vitamin B6 for nausea and vomiting.  Talk with your health care provider if you take vitamins that upset your stomach.  Safe Medications  Take one prenatal vitamin with at least 400 mcg of folic acid daily.  Use acetaminophen (Tylenol) for pain.   Try Maalox or Tums for heartburn. If these are not working, talk to your doctor about trying a different medication.  Diphenhydramine (Benadryl) can also be used safely in pregnancy.  Talk to your doctor about any other medications or vitamins you are taking!  Start Healthy Habits Now  What matters most is protecting your baby from this moment on. If you smoke, drink alcohol, or use drugs, now is the time to stop. If you need help, talk with your health care provider.  Smoking increases the risk of miscarriage or having a low-birth-weight baby. If you smoke, quit now.  Alcohol and drugs have been linked with miscarriage, birth defects, intellectual disability, and low birth weight. Do not drink alcohol or take drugs.  Continue your current exercise routine, or start one with walking, swimming, and other low impact exercises. Yoga specifically designed for pregnant moms is a great stress and pain reliever. Keep your self well hydrated and avoid overheating with all activity. If bleeding, fluid leakage, or cramping/contractions occur, stop the exercise and call your doctor.   Wear your seatbelt every time you are in the car. Fasten  the lap part underneath your growing belly and the shoulder part as you normally would.   Weight Gain  Add an additional 300 to 400 calories a day into your diet.  If your BMI is over 30, your ideal weight gain is 15-20 pounds.  Talk to your doctor if you are concerned about weight gain.   Keep Your Environment Save  You can still clean house and use scented products. Just take some simple precautions:  Wear gloves when using cleaning fluids.  Open windows to let in fresh air. Use a fan if you paint.  Avoid secondhand smoke.  Don t breathe fumes from nail polish, hair spray, cleansers, or other chemicals.  Work Concerns  The end of the first trimester is a good time to discuss working during pregnancy with your employer. Follow your health care provider s advice if your job requires you to stand for a long time, work with hazardous tools, or even sit at a desk all day. Your workspace, workload, or scheduled hours may need to be adjusted - perhaps you can change body postures more often or take an extra break.  Intimacy  Unless your health care provider tells you to, there is no reason to stop having sex while you re pregnant. You or your partner may notice changes in desire. Desire may be less in the first trimester, due to nausea and fatigue. In the second trimester, sex may be very enjoyable. The third trimester can be a challenge comfort-wise. Try different positions and see what s best for you both. As always, let your doctor know if you experience any bleeding, leakage of fluids, or cramping/contractions.  Other Pregnancy Concerns  Limit the amount of radiation you are exposed to. Always tell the radiology technologist that you are pregnant if having an xray or CT scan done.   Practice good hand washing to prevent infection.  Avoid cat litter.   Wash all fruits and vegetabes. Always cook meat thoroughly and do not eat raw fish. Do not eat more than 6 to 12 ounces of other fish sources.   Do not eat soft  cheeses.  Limit caffeine to less than 200 milligrams a days. The average cup of coffee as approximately 120 milligrams of caffeine.       Nonprescription Medications Thought To Be Safe In Pregnancy (take medication according to package directions)    NAUSEA AND MOTION SICKNESS   Vitamin C 500 mg, once a day with food   Vitamin B6 50 mg, one three times a day   Unisom tablets (not gel tabs) - 1/2 to 1 tab at bedtime; may also take 1/2 tab in the morning and mid-afternoon   Dramamine   Sea Bands   Ashlie tablets    CONGESTION AND COLDS   Chlortrimeton   Benadryl   Vicks Vapor Rub   Cough Drops  Avoid Robitussin and Mucinex in 1st trimester but otherwise safe during rest of pregnancy  Avoid pseudoephedrine     ALLERGIES   Alavert   Claritin   Tavist   Benadryl   Zyrtec    HEADACHES   Tylenol 325 mg 2-3 four times a day   Tylenol 500 mg 1-2 four times a day   DO NOT EXCEED 4,000 MG A DAY  DO NOT TAKE IBUPROFEN, MOTRIN, ADVIL, ASPIRIN, OR ALEVE     VAGINAL YEAST INFECTION   Monistat 3 or 7   Gyne-Lotrimin    HEMORRHOIDS   Preparation-H   Anusol   Anusol HC    HEARTBURN   Tums   Maalox (tablets or liquid)   Rolaids   Mylanta   Pepcid (famotidine) 20mg before meals (breakfast and supper)  NO PEPTOBISMOL OR ALKASELTZER (contains aspirin)     DIARRHEA (do not treat for the first 24-48 hrs)   Kaopectate   Imodium AD    CONSTIPATION   Colace (Docusate Sodium) - stool softener   Nenita-Colace (Colace + mild stimulant)   Any fiber supplement (Metamucil, Fibercon ,etc.)    GAS   Gas-X   Mylanta II with Simethicone   Mylicon    INSECT BITES   Lotions: Calamine, Caladryl, Benadryl   Oral: Benadryl tabs 25-50mg (every 6-8hrs)

## 2022-04-29 NOTE — PROGRESS NOTES
Clinic Note - Initial OB Visit    Ailin Calhoun is a 27 year old No obstetric history on file. female who presents to clinic for a new OB visit.      Estimated Date of Delivery: 2022 via LMP c/w 1st tri     She has not had bleeding since her LMP. She has not had any abdominal pain or cramping since her LMP - has had some pulling in abdomen. She has had nausea with vomiting - reflux contributing as well. Weigh loss has not occurred. This was a planned pregnancy. Very tired.    OTHER CONCERNS:   -hx depression - on zoloft 25mg daily at this time, hoping to wean off later in pregnancy  -lots of heartburn - previously on omeprazole but stopped during pregnancy    Pre Term Labor Risk Assessment  Is the patient's age <18 or >40? No  Patint's BMI is Body mass index is 33.57 kg/m .. Does patient have a BMI < 18.5? No  If previous pregnancy, was delivery within previous 6 months? No  Have you ever delivered a baby prior to 37 weeks gestation? No  Did conception for this pregnancy occur via In Vitro Fertilization? No  Summary: Patient is not high risk for  Labor for  labor.    Patient Active Problem List   Diagnosis     Mild persistent asthma without complication     Midline low back pain with left-sided sciatica, unspecified chronicity     Depression, unspecified depression type     Insomnia, unspecified type     Hx of abnormal cervical Pap smear     Heartburn       OB History    Para Term  AB Living   1 0 0 0 0 0   SAB IAB Ectopic Multiple Live Births   0 0 0 0 0      # Outcome Date GA Lbr Jayesh/2nd Weight Sex Delivery Anes PTL Lv   1 Current                History reviewed. No pertinent past medical history.    History reviewed. No pertinent surgical history.    Current Outpatient Medications   Medication Sig Dispense Refill     albuterol (PROAIR HFA/PROVENTIL HFA/VENTOLIN HFA) 108 (90 Base) MCG/ACT inhaler Inhale 2 puffs into the lungs every 6 hours 18 g 3     Doxylamine Succinate,  Sleep, (UNISOM PO)        ondansetron (ZOFRAN-ODT) 4 MG ODT tab Take 1 tablet (4 mg) by mouth every 8 hours as needed for nausea or vomiting 30 tablet 1     Prenatal Vit-Fe Fumarate-FA (PRENATAL PO)        Pyridoxine HCl (B-6 PO)        sertraline (ZOLOFT) 25 MG tablet Take 1 tablet (25 mg) by mouth daily 90 tablet 3       Do you have a history of any of the following medical conditions?  Condition Yes/No   Hypertension No   Heart disease, mitral valve prolapse, rheumatic fever No   Asthma or another chronic lung disease No   An autoimmune disorder No   Kidney disease No   Frequent urinary tract infections No   Migraine headaches No   Stroke, loss of sensation/function, seizures, or other neuro problem No   Diabetes No   Thyroid problems or have you taken thyroid medication No   Hepatitis, liver disease, jaundice No   Blood clots, phlebitis, pulmonary embolism or varicose veins No   Excessive bleeding after surgery or dental work No   Heavy menstrual periods requiring treatment No   Anemia No   Blood transfusions No        Would you refuse a blood transfusion? No     Prenatal Genetic Screening  Do you have a history of any of the following Yes/No        A metabolic disorder (e.g. Insulin-dependent DM, PKU) No        Recurrent pregnancy loss No     Do you, the baby's father, or anyone in your families have Yes/No        Thalassemia AND MCV <80 No        Hemophilia No        Neural tube defect No        Congenital heart defect No        Sickle cell disease or trait No        Muscular dystrophy No        Cystic fibrosis No        Mental retardation or autism No        Down's syndrome No        Shree-Sach's disease No        Betzaida's chorea No        Any other inherited genetic or chromosomal disorder No        A child with birth defects not listed above No     Infection History  At high risk for coming in contact with HIV No   Ever treated for tuberculosis No   Ever received the BCG vaccine for tuberculosis No   Ever  had genital herpes (or has your partner) No   Had a rash or viral illness since LMP No   Ever had chicken pox or the vaccine Yes   Ever had any other serious infectious disease No   Up to date with immunizations Yes      PERSONAL/SOCIAL HISTORY  Social History     Socioeconomic History     Marital status:    Tobacco Use     Smoking status: Never Smoker     Smokeless tobacco: Never Used   Vaping Use     Vaping Use: Never used   Substance and Sexual Activity     Alcohol use: Yes     Comment: rare     Drug use: Never   Social History Narrative     , No children    Nilton Pantoja MD         Have you used any tobacco products, alcohol or any other drugs during this pregnancy before or after your knew you were pregnant? no    REVIEW OF SYSTEMS  C: NEGATIVE for fever, chills, change in weight  E: NEGATIVE for vision changes or irritation  ENT: NEGATIVE for ear, mouth and throat problems  R: NEGATIVE for significant cough or SOB  B: NEGATIVE for masses, tenderness or discharge  CV: NEGATIVE for chest pain, palpitations or peripheral edema  GI: NEGATIVE for abdominal pain, heartburn, or change in bowel habits  : NEGATIVE for unusual urinary or vaginal symptoms.   M: NEGATIVE for significant arthralgias or myalgia  N: NEGATIVE for weakness, dizziness or paresthesias  P: NEGATIVE for changes in mood or affect    PHYSICAL EXAM:  /78   Wt 113.9 kg (251 lb)   LMP 02/23/2022   BMI 33.57 kg/m     GENERAL:  Pleasant pregnant female, alert, well groomed.  SKIN:  Warm and dry, without lesions or rashes  EYES:  Conjunctivae normal  LUNGS: breathing comfortably on room air, no cough  ABDOMEN: Soft without masses , tenderness or organomegaly. Fundus palpable at symphysis pubis.   MUSCULOSKELETAL:  Full range of motion.  EXTREMITIES:  No edema. No significant varicosities.       ASSESSMENT/PLAN  Encounter for supervision of normal first pregnancy in first trimester  G1 at 10 weeks today. Pregnancy  complicated by obesity (pregravid BMI 32.08), depression (on selective serotonin reuptake inhibitor), asthma (mild, well controlled). Labs as below. [facility] today.   - HIV Antigen Antibody Combo  - Hepatitis C Screen Reflex to HCV RNA Quant and Genotype  - ABO/Rh type and screen  - Hepatitis B surface antigen  - CBC with platelets  - Rubella Antibody IgG  - Treponema Abs w Reflex to RPR and Titer  - Urine Culture Aerobic Bacterial  - Chlamydia trachomatis PCR  - Neisseria gonorrhoeae PCR  - *UA reflex to Microscopic  - Vitamin D Deficiency  - Hemoglobin A1c  - [facility] Non-Invasive Prenatal Screening       - Routine prenatal labs today. CBC, type and screen, rubella, HIV, gonorrhea/chlamydia, RPR, hepatitis B, urinalysis with culture.   - Pap smear up todate   - Early ultrasound for dating alreadycompleted.     Referral(s): none    Discussed:  -Recommended weight gain of <20 lbs for BMI of Body mass index is 33.57 kg/m ..  -Genetic screening options, including false positive rate of 5% with screening tests and diagnostic options (chorionic villus sampling, amniocentesis), and patient desires - [facility]ae done today  -Safe medications during pregnancy. Is currently taking prenatal vitamin daily.   -Healthy habits including not using tobacco or alcohol, exercising regularly and maintaining healthy diet  -Information given on tips for dealing with nausea, healthy habits, exposures, safety, prenatal appointment schedule, and when to call the doctor.  -Recommendations for breastfeeding.    Will follow up in 4 weeks for routine pre- care.    Lainey Tillman MD  [facility]

## 2022-05-04 ENCOUNTER — PRENATAL OFFICE VISIT (OUTPATIENT)
Dept: FAMILY MEDICINE | Facility: CLINIC | Age: 27
End: 2022-05-04
Payer: COMMERCIAL

## 2022-05-04 VITALS — DIASTOLIC BLOOD PRESSURE: 78 MMHG | WEIGHT: 251 LBS | SYSTOLIC BLOOD PRESSURE: 112 MMHG | BODY MASS INDEX: 33.57 KG/M2

## 2022-05-04 DIAGNOSIS — F32.A DEPRESSION, UNSPECIFIED DEPRESSION TYPE: ICD-10-CM

## 2022-05-04 DIAGNOSIS — O99.210 OBESITY AFFECTING PREGNANCY, ANTEPARTUM: ICD-10-CM

## 2022-05-04 DIAGNOSIS — Z34.01 ENCOUNTER FOR SUPERVISION OF NORMAL FIRST PREGNANCY IN FIRST TRIMESTER: Primary | ICD-10-CM

## 2022-05-04 DIAGNOSIS — O23.41 URINARY TRACT INFECTION IN MOTHER DURING FIRST TRIMESTER OF PREGNANCY: ICD-10-CM

## 2022-05-04 DIAGNOSIS — J45.30 MILD PERSISTENT ASTHMA WITHOUT COMPLICATION: ICD-10-CM

## 2022-05-04 PROBLEM — R12 HEARTBURN: Status: ACTIVE | Noted: 2022-05-04

## 2022-05-04 LAB
ABO/RH(D): NORMAL
ALBUMIN UR-MCNC: 30 MG/DL
AMORPH CRY #/AREA URNS HPF: ABNORMAL /HPF
ANTIBODY SCREEN: NEGATIVE
APPEARANCE UR: ABNORMAL
BACTERIA #/AREA URNS HPF: ABNORMAL /HPF
BILIRUB UR QL STRIP: NEGATIVE
COLOR UR AUTO: YELLOW
ERYTHROCYTE [DISTWIDTH] IN BLOOD BY AUTOMATED COUNT: 12.5 % (ref 10–15)
GLUCOSE UR STRIP-MCNC: NEGATIVE MG/DL
HBA1C MFR BLD: 5.3 % (ref 0–5.6)
HCT VFR BLD AUTO: 34.5 % (ref 35–47)
HGB BLD-MCNC: 11.8 G/DL (ref 11.7–15.7)
HGB UR QL STRIP: NEGATIVE
HIV 1+2 AB+HIV1 P24 AG SERPL QL IA: NEGATIVE
KETONES UR STRIP-MCNC: NEGATIVE MG/DL
LEUKOCYTE ESTERASE UR QL STRIP: ABNORMAL
MCH RBC QN AUTO: 28.1 PG (ref 26.5–33)
MCHC RBC AUTO-ENTMCNC: 34.2 G/DL (ref 31.5–36.5)
MCV RBC AUTO: 82 FL (ref 78–100)
NITRATE UR QL: NEGATIVE
PH UR STRIP: 8.5 [PH] (ref 5–8)
PLATELET # BLD AUTO: 311 10E3/UL (ref 150–450)
RBC # BLD AUTO: 4.2 10E6/UL (ref 3.8–5.2)
RBC #/AREA URNS AUTO: ABNORMAL /HPF
SP GR UR STRIP: 1.02 (ref 1–1.03)
SPECIMEN EXPIRATION DATE: NORMAL
SQUAMOUS #/AREA URNS AUTO: ABNORMAL /LPF
UROBILINOGEN UR STRIP-ACNC: 1 E.U./DL
WBC # BLD AUTO: 11.8 10E3/UL (ref 4–11)
WBC #/AREA URNS AUTO: ABNORMAL /HPF

## 2022-05-04 PROCEDURE — 86780 TREPONEMA PALLIDUM: CPT | Performed by: FAMILY MEDICINE

## 2022-05-04 PROCEDURE — 36415 COLL VENOUS BLD VENIPUNCTURE: CPT | Performed by: FAMILY MEDICINE

## 2022-05-04 PROCEDURE — 86900 BLOOD TYPING SEROLOGIC ABO: CPT | Performed by: FAMILY MEDICINE

## 2022-05-04 PROCEDURE — 87086 URINE CULTURE/COLONY COUNT: CPT | Performed by: FAMILY MEDICINE

## 2022-05-04 PROCEDURE — 99213 OFFICE O/P EST LOW 20 MIN: CPT | Performed by: FAMILY MEDICINE

## 2022-05-04 PROCEDURE — 99207 PR FIRST OB VISIT: CPT | Performed by: FAMILY MEDICINE

## 2022-05-04 PROCEDURE — 87491 CHLMYD TRACH DNA AMP PROBE: CPT | Performed by: FAMILY MEDICINE

## 2022-05-04 PROCEDURE — 86901 BLOOD TYPING SEROLOGIC RH(D): CPT | Performed by: FAMILY MEDICINE

## 2022-05-04 PROCEDURE — 86762 RUBELLA ANTIBODY: CPT | Performed by: FAMILY MEDICINE

## 2022-05-04 PROCEDURE — 82306 VITAMIN D 25 HYDROXY: CPT | Performed by: FAMILY MEDICINE

## 2022-05-04 PROCEDURE — 87340 HEPATITIS B SURFACE AG IA: CPT | Performed by: FAMILY MEDICINE

## 2022-05-04 PROCEDURE — 85027 COMPLETE CBC AUTOMATED: CPT | Performed by: FAMILY MEDICINE

## 2022-05-04 PROCEDURE — 86803 HEPATITIS C AB TEST: CPT | Performed by: FAMILY MEDICINE

## 2022-05-04 PROCEDURE — 87389 HIV-1 AG W/HIV-1&-2 AB AG IA: CPT | Performed by: FAMILY MEDICINE

## 2022-05-04 PROCEDURE — 87591 N.GONORRHOEAE DNA AMP PROB: CPT | Performed by: FAMILY MEDICINE

## 2022-05-04 PROCEDURE — 81001 URINALYSIS AUTO W/SCOPE: CPT | Performed by: FAMILY MEDICINE

## 2022-05-04 PROCEDURE — 86850 RBC ANTIBODY SCREEN: CPT | Performed by: FAMILY MEDICINE

## 2022-05-04 PROCEDURE — 83036 HEMOGLOBIN GLYCOSYLATED A1C: CPT | Performed by: FAMILY MEDICINE

## 2022-05-05 LAB
C TRACH DNA SPEC QL NAA+PROBE: NEGATIVE
DEPRECATED CALCIDIOL+CALCIFEROL SERPL-MC: 27 UG/L (ref 20–75)
HBV SURFACE AG SERPL QL IA: NONREACTIVE
HCV AB SERPL QL IA: NONREACTIVE
N GONORRHOEA DNA SPEC QL NAA+PROBE: NEGATIVE
RUBV IGG SERPL QL IA: 2.93 INDEX
RUBV IGG SERPL QL IA: POSITIVE
T PALLIDUM AB SER QL: NONREACTIVE

## 2022-05-06 LAB — BACTERIA UR CULT: NORMAL

## 2022-05-06 RX ORDER — CEPHALEXIN 500 MG/1
500 CAPSULE ORAL 4 TIMES DAILY
Qty: 28 CAPSULE | Refills: 0 | Status: SHIPPED | OUTPATIENT
Start: 2022-05-06 | End: 2022-05-13

## 2022-05-10 ENCOUNTER — MYC MEDICAL ADVICE (OUTPATIENT)
Dept: FAMILY MEDICINE | Facility: CLINIC | Age: 27
End: 2022-05-10
Payer: COMMERCIAL

## 2022-05-10 LAB — SCANNED LAB RESULT: NORMAL

## 2022-05-26 ENCOUNTER — MYC MEDICAL ADVICE (OUTPATIENT)
Dept: FAMILY MEDICINE | Facility: CLINIC | Age: 27
End: 2022-05-26
Payer: COMMERCIAL

## 2022-05-26 DIAGNOSIS — O21.9 NAUSEA AND VOMITING DURING PREGNANCY: ICD-10-CM

## 2022-05-26 NOTE — TELEPHONE ENCOUNTER
Pending refill for medication and routing to med refill pool.    Ely Poe RN on 5/26/2022 at 10:47 AM

## 2022-05-27 RX ORDER — ONDANSETRON 4 MG/1
4 TABLET, ORALLY DISINTEGRATING ORAL EVERY 8 HOURS PRN
Qty: 30 TABLET | Refills: 1 | Status: SHIPPED | OUTPATIENT
Start: 2022-05-27 | End: 2022-08-24

## 2022-05-27 NOTE — TELEPHONE ENCOUNTER
"Last Written Prescription Date:  4/27/22  Last Fill Quantity: 30,  # refills: 1   Last office visit provider:  3/23/22     Requested Prescriptions   Pending Prescriptions Disp Refills     ondansetron (ZOFRAN ODT) 4 MG ODT tab 30 tablet 1     Sig: Take 1 tablet (4 mg) by mouth every 8 hours as needed for nausea or vomiting        Antivertigo/Antiemetic Agents Passed - 5/26/2022 10:54 AM        Passed - Recent (12 mo) or future (30 days) visit within the authorizing provider's specialty     Patient has had an office visit with the authorizing provider or a provider within the authorizing providers department within the previous 12 mos or has a future within next 30 days. See \"Patient Info\" tab in inbasket, or \"Choose Columns\" in Meds & Orders section of the refill encounter.              Passed - Medication is active on med list        Passed - Patient is 18 years of age or older             Lisa Tom RN 05/27/22 1:13 PM  "

## 2022-05-31 NOTE — PROGRESS NOTES
Clinic Note - Return OB Visit    Ailin Calhoun is a 27 year old  female who presents to clinic for a follow up OB visit. She is currently 14w0d with an estimated date of delivery of 2022 via LMP c/w 1st tri US. She denies headache, chest pain, shortness of breath, abdominal pain/contractions, vaginal bleeding, or abnormal discharge. She has not felt baby move.     New concerns today:   -still having nausea - doesn't feel like zofran is working much  -L breast pain, sharp, to nipples  -allergies  -round ligament pain    OB History    Para Term  AB Living   1 0 0 0 0 0   SAB IAB Ectopic Multiple Live Births   0 0 0 0 0      # Outcome Date GA Lbr Jayesh/2nd Weight Sex Delivery Anes PTL Lv   1 Current                Physical exam:  /70   Pulse 82   Wt 112.2 kg (247 lb 4 oz)   LMP 2022   BMI 33.07 kg/m      General: alert, female in no acute distress  Abdomen: gravid uterus appropriate for gestation age above pubic symphysis, non-tender, FHTs 150  Extremities: no edema    Encounter for supervision of normal first pregnancy in second trimester  Obesity affecting pregnancy, antepartum  G1 at 14 weeks today. Pregnancy complicated by obesity (pregravid BMI 32.08), depression (on selective serotonin reuptake inhibitor), asthma (mild, well controlled).     Nausea and vomiting during pregnancy  Uncontrolled with zofran - will trial reglan, anticipate resolution in the next few weeks.  - metoclopramide (REGLAN) 5 MG tablet  Dispense: 30 tablet; Refill: 1     Reviewed pre-verena labs: O pos, invitae nml, baby boy    Follow up in 4 weeks for routine prenatal visit.     Lainey Tillman MD  [facility]

## 2022-05-31 NOTE — PATIENT INSTRUCTIONS
Phone Numbers:  [facility]: [phone]  [facility]: [phone]    When to call or come in to the hospital   If you have any bleeding or leakage of fluids.   If you have uterine cramping that does not resolve with rest and fluids.  If you have a headache not resolved with tylenol, right upper abdominal pain, or sudden onset of swelling.  You know your body best. Never hesitate to call or go to the hospital if something doesn't feel right!    Genetic Screening  Sampling of your blood to screen for genetic abnormalities, like Down's syndrome, in your baby  Done at 16-18 weeks gestation  Screening test only - further testing, like advanced ultrasound or amniocentesis, would be needed to confirm positive test!  Recommended for mothers over age 35, history of genetic abnormalities.  Talk to your doctor if you have further questions, or are interested in this screening test.     Breastfeeding  Breast feeding is best, for you and baby!   Recommendations are for exclusive breastfeeding for babies first 6 months of life.  Talk to your doctor if you have more questions about breastfeeding your baby.    Other Pregnancy Concerns  Continue to take a prenatal vitamin daily  Maintain an active, healthy lifestyle.   If this is your first baby, you can expect to start feeling movement at 20-24 weeks gestation. If this is your second or more pregnancy, you will generally start feeling movement at 18-22 weeks gestation.   [place] parenting classes [email or web address]/  [place] parenting classes [email or web address]  Free online classes through [employer]     Nonprescription Medications Thought To Be Safe In Pregnancy (take medication according to package directions)    CONGESTION AND COLDS   Benadryl   Vicks Vapor Rub   Cough Drops  Avoid Robitussin and Mucinex in 1st trimester but otherwise safe during rest of pregnancy  Avoid  pseudoephedrine     ALLERGIES   Claritin   Benadryl   Zyrtec

## 2022-06-01 ENCOUNTER — PRENATAL OFFICE VISIT (OUTPATIENT)
Dept: FAMILY MEDICINE | Facility: CLINIC | Age: 27
End: 2022-06-01
Payer: COMMERCIAL

## 2022-06-01 VITALS
SYSTOLIC BLOOD PRESSURE: 120 MMHG | BODY MASS INDEX: 33.07 KG/M2 | HEART RATE: 82 BPM | WEIGHT: 247.25 LBS | DIASTOLIC BLOOD PRESSURE: 70 MMHG

## 2022-06-01 DIAGNOSIS — Z34.02 ENCOUNTER FOR SUPERVISION OF NORMAL FIRST PREGNANCY IN SECOND TRIMESTER: Primary | ICD-10-CM

## 2022-06-01 DIAGNOSIS — O21.9 NAUSEA AND VOMITING DURING PREGNANCY: ICD-10-CM

## 2022-06-01 DIAGNOSIS — O99.210 OBESITY AFFECTING PREGNANCY, ANTEPARTUM: ICD-10-CM

## 2022-06-01 PROCEDURE — 99207 PR PRENATAL VISIT: CPT | Performed by: FAMILY MEDICINE

## 2022-06-01 RX ORDER — METOCLOPRAMIDE 5 MG/1
5 TABLET ORAL 3 TIMES DAILY PRN
Qty: 30 TABLET | Refills: 1 | Status: SHIPPED | OUTPATIENT
Start: 2022-06-01 | End: 2022-07-06

## 2022-06-24 NOTE — PROGRESS NOTES
Clinic Note - Return OB Visit    Ailin Calhoun is a 27 year old  female who presents to clinic for a follow up OB visit. She is currently 18w0d with an estimated date of delivery of 2022 via LMP c/w 1st tri US. She denies headache, chest pain, shortness of breath, abdominal pain/contractions, vaginal bleeding, or abnormal discharge. She has not felt baby move.     New concerns today:   -nausea much better, still intermittent  -fatigue still present  -sciatic back pain still present occasionally    OB History    Para Term  AB Living   1 0 0 0 0 0   SAB IAB Ectopic Multiple Live Births   0 0 0 0 0      # Outcome Date GA Lbr Jayesh/2nd Weight Sex Delivery Anes PTL Lv   1 Current                Physical exam:  /60 (BP Location: Right arm, Patient Position: Sitting, Cuff Size: Adult Regular)   Pulse 101   Wt 111.4 kg (245 lb 11.2 oz)   LMP 2022   SpO2 98%   Breastfeeding No   BMI 32.86 kg/m      General: alert, female in no acute distress  Abdomen: gravid uterus appropriate for gestation age above pubic symphysis, non-tender, FHTs 150 - cardiac activity and fetal movements on handheld US  Extremities: no edema    Encounter for supervision of normal first pregnancy in second trimester  Obesity affecting pregnancy, antepartum  G1 at 18+0 weeks today. Pregnancy complicated by obesity (pregravid BMI 32.08), depression (on selective serotonin reuptake inhibitor), asthma (mild, well controlled). Fetal survey ordered today to be done at 20 weeks.       Reviewed pre- labs: O pos, invitae nml, baby boy    Follow up in 4 weeks for routine prenatal visit.     Lainey Tillman MD  [facility]

## 2022-06-24 NOTE — PATIENT INSTRUCTIONS
Phone Numbers:  [facility]: [phone]  [facility]: [phone]    Call [phone] to schedule the ultrasound    When to call or come in to the hospital   If you have any bleeding or leakage of fluids.   If you have lower abdominal/uterine cramping not relieved with rest and fluids.  If you have a headache not resolved with tylenol, right upper abdominal pain, or sudden onset of swelling.  You know your body best. Never hesitate to call or go to the hospital if something doesn't feel right!    Fetal survey ultrasound  We will arrange for an ultrasound around 20-22 weeks gestation. This is the ultrasound where we look to see all parts of baby to make sure baby is growing normally! Many families look forward to this visit as a chance to try and determine the babies gender.    Consider childbirth education classes  Childbirth classes are a great way to learn what to expect from the remainder of pregnancy, tips for preparing and handeling the stresses of labor, and learning more about your . Classes are also great for learning more about breastfeeding!    [place] parenting classes [email or web address]  [place] parenting classes [email or web address]  Free online classes through [employer]

## 2022-06-29 ENCOUNTER — PRENATAL OFFICE VISIT (OUTPATIENT)
Dept: FAMILY MEDICINE | Facility: CLINIC | Age: 27
End: 2022-06-29
Payer: COMMERCIAL

## 2022-06-29 VITALS
DIASTOLIC BLOOD PRESSURE: 60 MMHG | WEIGHT: 245.7 LBS | OXYGEN SATURATION: 98 % | SYSTOLIC BLOOD PRESSURE: 110 MMHG | HEART RATE: 101 BPM | BODY MASS INDEX: 32.86 KG/M2

## 2022-06-29 DIAGNOSIS — O99.210 OBESITY AFFECTING PREGNANCY, ANTEPARTUM: ICD-10-CM

## 2022-06-29 DIAGNOSIS — Z34.02 ENCOUNTER FOR SUPERVISION OF NORMAL FIRST PREGNANCY IN SECOND TRIMESTER: Primary | ICD-10-CM

## 2022-06-29 PROCEDURE — 99207 PR PRENATAL VISIT: CPT | Performed by: FAMILY MEDICINE

## 2022-06-29 ASSESSMENT — PAIN SCALES - GENERAL: PAINLEVEL: NO PAIN (0)

## 2022-07-06 DIAGNOSIS — O21.9 NAUSEA AND VOMITING DURING PREGNANCY: ICD-10-CM

## 2022-07-06 RX ORDER — METOCLOPRAMIDE 5 MG/1
TABLET ORAL
Qty: 30 TABLET | Refills: 1 | Status: SHIPPED | OUTPATIENT
Start: 2022-07-06 | End: 2022-08-24

## 2022-07-07 NOTE — TELEPHONE ENCOUNTER
"Last Written Prescription Date:  6/1/2022  Last Fill Quantity: 30,  # refills: 1   Last office visit provider:  6/29/2022     Requested Prescriptions   Pending Prescriptions Disp Refills     metoclopramide (REGLAN) 5 MG tablet [Pharmacy Med Name: METOCLOPRAMIDE 5MG TABLETS] 30 tablet 1     Sig: TAKE 1 TABLET(5 MG) BY MOUTH THREE TIMES DAILY AS NEEDED FOR NAUSEA OR VOMITING        Antivertigo/Antiemetic Agents Passed - 7/6/2022  8:44 AM        Passed - Recent (12 mo) or future (30 days) visit within the authorizing provider's specialty     Patient has had an office visit with the authorizing provider or a provider within the authorizing providers department within the previous 12 mos or has a future within next 30 days. See \"Patient Info\" tab in inbasket, or \"Choose Columns\" in Meds & Orders section of the refill encounter.              Passed - Medication is active on med list        Passed - Patient is 18 years of age or older             Winnie Begum RN 07/06/22 11:16 PM  "

## 2022-07-08 ENCOUNTER — HOSPITAL ENCOUNTER (OUTPATIENT)
Dept: ULTRASOUND IMAGING | Facility: CLINIC | Age: 27
Discharge: HOME | End: 2022-07-08
Attending: FAMILY MEDICINE | Admitting: FAMILY MEDICINE
Payer: COMMERCIAL

## 2022-07-08 DIAGNOSIS — Z34.02 ENCOUNTER FOR SUPERVISION OF NORMAL FIRST PREGNANCY IN SECOND TRIMESTER: ICD-10-CM

## 2022-07-08 PROCEDURE — 76805 OB US >/= 14 WKS SNGL FETUS: CPT

## 2022-07-21 ENCOUNTER — PRENATAL OFFICE VISIT (OUTPATIENT)
Dept: FAMILY MEDICINE | Facility: CLINIC | Age: 27
End: 2022-07-21
Payer: COMMERCIAL

## 2022-07-21 VITALS
TEMPERATURE: 97.8 F | WEIGHT: 248.8 LBS | HEART RATE: 80 BPM | SYSTOLIC BLOOD PRESSURE: 110 MMHG | DIASTOLIC BLOOD PRESSURE: 60 MMHG | OXYGEN SATURATION: 98 % | BODY MASS INDEX: 33.28 KG/M2

## 2022-07-21 DIAGNOSIS — Z34.02 ENCOUNTER FOR SUPERVISION OF NORMAL FIRST PREGNANCY IN SECOND TRIMESTER: Primary | ICD-10-CM

## 2022-07-21 DIAGNOSIS — O99.210 OBESITY AFFECTING PREGNANCY, ANTEPARTUM: ICD-10-CM

## 2022-07-21 PROCEDURE — 99207 PR PRENATAL VISIT: CPT | Performed by: FAMILY MEDICINE

## 2022-07-21 NOTE — PROGRESS NOTES
Clinic Note - Return OB Visit    Ailin Calhoun is a 27 year old  female who presents to clinic for a follow up OB visit. She is currently 21w1d with an estimated date of delivery of 2022 via LMP c/w 1st tri US. She denies headache, chest pain, shortness of breath, abdominal pain/contractions, vaginal bleeding, or abnormal discharge. She has felt baby move.     New concerns today:   -nausea much better  -some ligament pain    OB History    Para Term  AB Living   1 0 0 0 0 0   SAB IAB Ectopic Multiple Live Births   0 0 0 0 0      # Outcome Date GA Lbr Jayesh/2nd Weight Sex Delivery Anes PTL Lv   1 Current                Physical exam:  /60 (BP Location: Left arm, Cuff Size: Adult Regular)   Pulse 80   Temp 97.8  F (36.6  C) (Temporal)   Wt 112.9 kg (248 lb 12.8 oz)   LMP 2022   SpO2 98%   BMI 33.28 kg/m      General: alert, female in no acute distress  Abdomen: gravid uterus appropriate for gestation age at 21 cm, non-tender, FHTs 155   Extremities: no edema    Encounter for supervision of normal first pregnancy in second trimester  Obesity affecting pregnancy, antepartum  G1 at 21+1 weeks today. Pregnancy complicated by obesity (pregravid BMI 32.08), depression (on selective serotonin reuptake inhibitor), asthma (mild, well controlled).        Reviewed pre-verena labs: O pos, invitae nml, baby boy    Follow up in 4 weeks for routine prenatal visit with 1 hr GCT    Lainey Tillman MD  [facility]

## 2022-07-21 NOTE — PATIENT INSTRUCTIONS
Phone Numbers:  [facility]: [phone]  [facility]: [phone]    When to call or come in to the hospital   If you have any bleeding or leakage of fluids.   If you have lower abdominal/uterine cramping not relieved with rest and fluids.  If you have a headache not resolved with tylenol, right upper abdominal pain, or sudden onset of swelling.  You know your body best. Never hesitate to call or go to the hospital if something doesn't feel right!    Fetal survey ultrasound  We will arrange for an ultrasound around 20-22 weeks gestation. This is the ultrasound where we look to see all parts of baby to make sure baby is growing normally! Many families look forward to this visit as a chance to try and determine the babies gender.    Consider childbirth education classes  Childbirth classes are a great way to learn what to expect from the remainder of pregnancy, tips for preparing and handeling the stresses of labor, and learning more about your . Classes are also great for learning more about breastfeeding!    [place] parenting classes [email or web address]  [place] parenting classes [email or web address]-information  Free online classes through [employer]

## 2022-08-23 ASSESSMENT — ASTHMA QUESTIONNAIRES
ACT_TOTALSCORE: 19
QUESTION_3 LAST FOUR WEEKS HOW OFTEN DID YOUR ASTHMA SYMPTOMS (WHEEZING, COUGHING, SHORTNESS OF BREATH, CHEST TIGHTNESS OR PAIN) WAKE YOU UP AT NIGHT OR EARLIER THAN USUAL IN THE MORNING: ONCE OR TWICE
QUESTION_5 LAST FOUR WEEKS HOW WOULD YOU RATE YOUR ASTHMA CONTROL: SOMEWHAT CONTROLLED
QUESTION_2 LAST FOUR WEEKS HOW OFTEN HAVE YOU HAD SHORTNESS OF BREATH: ONCE OR TWICE A WEEK
QUESTION_1 LAST FOUR WEEKS HOW MUCH OF THE TIME DID YOUR ASTHMA KEEP YOU FROM GETTING AS MUCH DONE AT WORK, SCHOOL OR AT HOME: A LITTLE OF THE TIME
QUESTION_4 LAST FOUR WEEKS HOW OFTEN HAVE YOU USED YOUR RESCUE INHALER OR NEBULIZER MEDICATION (SUCH AS ALBUTEROL): ONCE A WEEK OR LESS
ACT_TOTALSCORE: 19

## 2022-08-23 NOTE — PATIENT INSTRUCTIONS
Phone Numbers:  [facility]: [phone]  [facility]: [phone]     When to call or come in to the hospital  If you notice a decrease in your baby's movement.   If your begin to experience contractions that are 5 minutes or less apart and lasting for over 45 seconds, or if contractions are becoming more painful.  If you have any bleeding or leakage of fluids.   If you have a headache not resolved with tylenol, right upper abdominal pain, or sudden onset of swelling.  You know your body best. Never hesitate to call or go to the hospital if something doesn't feel right!    Gestational Diabetes Screen  At your next visit, you will be screened for gestational diabetes. You will drink a sugary drink and then have your blood drawn to see how your body responds to a sugar load. This test takes about an hour to complete - please plan your schedule accordingly!    The Benefits of Breastfeeding   Breastfeeding gives your new baby the very best start. It supplies nutrition, comfort, and love. Experts agree: Breastfeeding is the healthiest choice for babies during the first year of life and beyond. It s healthy for Mom, too. Breastfeeding may be challenging at first. But with support, you and your baby can succeed together.      Healthiest for Baby   Breastmilk is the ideal food for babies. It has all the nutrients your little one needs to grow healthy and strong. Here are some of the many benefits for your baby:   Breastfeeding provides contact that babies love. Frequent skin-to-skin time with Mom is calming and comforting.   Breastmilk is full of antibodies. These are substances that help your baby fight infection. Breastmilk reduces your baby's risk of respiratory illnesses, ear infections, and diarrhea.   Breastfeeding reduces your baby s risk of allergies, colds, and many other diseases.   Breastmilk changes as your baby grows, meeting her changing needs.   Breastmilk is easy for your baby to digest.   Breastmilk  contains DHA, a fat that is good for your baby s developing brain and eyes.   Breastmilk decreases the risk of sudden infant death syndrome (SIDS).    Healthiest for Mom   For many women, breastfeeding is an amazing experience. It creates a strong bond between mother and baby. Other benefits for Mom include:   You can feel proud knowing that your baby is growing healthy and strong because of your milk.   Breastmilk is convenient. It s free, clean, and always the right temperature.   Breastfeeding burns calories. This can help you lose pregnancy weight faster.   Breastfeeding releases hormones that contract the uterus. This helps the uterus return to its normal size after childbirth.   Mothers who breastfeed have a decreased risk of ovarian and breast cancers.    [place] parenting classes [email or web address]  [place] parenting classes [email or web address]-delivery-[email or web address]-information  Free online classes through [employer]

## 2022-08-23 NOTE — PROGRESS NOTES
Clinic Note - Return OB Visit    Ailin Calhoun is a 27 year old  female who presents to clinic for a follow up OB visit. She is currently 26w0d with an estimated date of delivery of 2022 via LMP c/w 1st tri US. She denies headache, chest pain, shortness of breath, abdominal pain/contractions, vaginal bleeding, or abnormal discharge. She has felt baby move.     New concerns today:   -feeling very fatigued  -round ligament pains, some sciatic pain - saw chiropractor  -still a little nausea  -worried about having a big baby ( was 12 lbs at birth)    OB History    Para Term  AB Living   1 0 0 0 0 0   SAB IAB Ectopic Multiple Live Births   0 0 0 0 0      # Outcome Date GA Lbr Jayesh/2nd Weight Sex Delivery Anes PTL Lv   1 Current                Physical exam:  /60   Pulse 92   Temp 98  F (36.7  C)   Wt 114.5 kg (252 lb 6 oz)   LMP 2022   SpO2 98%   BMI 33.76 kg/m      General: alert, female in no acute distress  Abdomen: gravid uterus appropriate for gestation age at 26 cm, non-tender, FHTs 155   Extremities: no edema    Encounter for supervision of normal first pregnancy in second trimester  Obesity affecting pregnancy, antepartum  G1 at 26+0 weeks today. Pregnancy complicated by obesity (pregravid BMI 32.08), depression (on selective serotonin reuptake inhibitor), asthma (mild, well controlled).        Reviewed pre-verena labs: O pos, invitae nml, baby boy    Follow up in 4 weeks for routine prenatal visit     Lainey Tillman MD  [facility]

## 2022-08-24 ENCOUNTER — PRENATAL OFFICE VISIT (OUTPATIENT)
Dept: FAMILY MEDICINE | Facility: CLINIC | Age: 27
End: 2022-08-24
Payer: COMMERCIAL

## 2022-08-24 VITALS
HEART RATE: 92 BPM | DIASTOLIC BLOOD PRESSURE: 60 MMHG | BODY MASS INDEX: 33.76 KG/M2 | WEIGHT: 252.38 LBS | TEMPERATURE: 98 F | SYSTOLIC BLOOD PRESSURE: 100 MMHG | OXYGEN SATURATION: 98 %

## 2022-08-24 DIAGNOSIS — O99.210 OBESITY AFFECTING PREGNANCY, ANTEPARTUM: ICD-10-CM

## 2022-08-24 DIAGNOSIS — O21.9 NAUSEA AND VOMITING DURING PREGNANCY: ICD-10-CM

## 2022-08-24 DIAGNOSIS — Z34.02 ENCOUNTER FOR SUPERVISION OF NORMAL FIRST PREGNANCY IN SECOND TRIMESTER: Primary | ICD-10-CM

## 2022-08-24 LAB
ERYTHROCYTE [DISTWIDTH] IN BLOOD BY AUTOMATED COUNT: 12.9 % (ref 10–15)
GLUCOSE 1H P 50 G GLC PO SERPL-MCNC: 106 MG/DL (ref 70–129)
HCT VFR BLD AUTO: 32 % (ref 35–47)
HGB BLD-MCNC: 10.7 G/DL (ref 11.7–15.7)
MCH RBC QN AUTO: 27.9 PG (ref 26.5–33)
MCHC RBC AUTO-ENTMCNC: 33.4 G/DL (ref 31.5–36.5)
MCV RBC AUTO: 83 FL (ref 78–100)
PLATELET # BLD AUTO: 238 10E3/UL (ref 150–450)
RBC # BLD AUTO: 3.84 10E6/UL (ref 3.8–5.2)
WBC # BLD AUTO: 10.9 10E3/UL (ref 4–11)

## 2022-08-24 PROCEDURE — 36415 COLL VENOUS BLD VENIPUNCTURE: CPT | Performed by: FAMILY MEDICINE

## 2022-08-24 PROCEDURE — 99207 PR PRENATAL VISIT: CPT | Performed by: FAMILY MEDICINE

## 2022-08-24 PROCEDURE — 82950 GLUCOSE TEST: CPT | Performed by: FAMILY MEDICINE

## 2022-08-24 PROCEDURE — 86780 TREPONEMA PALLIDUM: CPT | Performed by: FAMILY MEDICINE

## 2022-08-24 PROCEDURE — 85027 COMPLETE CBC AUTOMATED: CPT | Performed by: FAMILY MEDICINE

## 2022-08-24 RX ORDER — ONDANSETRON 4 MG/1
4 TABLET, ORALLY DISINTEGRATING ORAL EVERY 8 HOURS PRN
Qty: 30 TABLET | Refills: 1 | Status: SHIPPED | OUTPATIENT
Start: 2022-08-24 | End: 2022-11-06

## 2022-08-24 ASSESSMENT — PAIN SCALES - GENERAL: PAINLEVEL: MODERATE PAIN (5)

## 2022-08-25 LAB — T PALLIDUM AB SER QL: NONREACTIVE

## 2022-09-07 DIAGNOSIS — O21.9 NAUSEA AND VOMITING DURING PREGNANCY: Primary | ICD-10-CM

## 2022-09-07 RX ORDER — METOCLOPRAMIDE 5 MG/1
5 TABLET ORAL 3 TIMES DAILY PRN
Qty: 30 TABLET | Refills: 1 | OUTPATIENT
Start: 2022-09-07 | End: 2022-10-07

## 2022-09-07 NOTE — TELEPHONE ENCOUNTER
metoclopramide (REGLAN) 5 MG tablet (Discontinued) 30 tablet 1 7/6/2022 8/24/2022 No   Sig: TAKE 1 TABLET(5 MG) BY MOUTH THREE TIMES DAILY AS NEEDED FOR NAUSEA OR VOMITING   Sent to pharmacy as: Metoclopramide HCl 5 MG Oral Tablet (REGLAN)   Class: E-Prescribe   Order: [id number]

## 2022-09-14 ENCOUNTER — NURSE TRIAGE (OUTPATIENT)
Dept: NURSING | Facility: CLINIC | Age: 27
End: 2022-09-14

## 2022-09-14 ENCOUNTER — HOSPITAL ENCOUNTER (EMERGENCY)
Facility: CLINIC | Age: 27
Discharge: HOME | End: 2022-09-14
Admitting: PHYSICIAN ASSISTANT
Payer: COMMERCIAL

## 2022-09-14 VITALS
BODY MASS INDEX: 32.51 KG/M2 | OXYGEN SATURATION: 98 % | WEIGHT: 240 LBS | HEIGHT: 72 IN | RESPIRATION RATE: 18 BRPM | HEART RATE: 112 BPM | DIASTOLIC BLOOD PRESSURE: 89 MMHG | SYSTOLIC BLOOD PRESSURE: 162 MMHG

## 2022-09-14 DIAGNOSIS — G57.01 SCIATIC NERVE DISEASE, RIGHT: ICD-10-CM

## 2022-09-14 PROCEDURE — 99284 EMERGENCY DEPT VISIT MOD MDM: CPT

## 2022-09-14 RX ORDER — LIDOCAINE 4 G/G
1 PATCH TOPICAL EVERY 24 HOURS
Qty: 10 PATCH | Refills: 1 | Status: ON HOLD | OUTPATIENT
Start: 2022-09-14 | End: 2022-12-02

## 2022-09-14 RX ORDER — OXYCODONE AND ACETAMINOPHEN 5; 325 MG/1; MG/1
1 TABLET ORAL EVERY 6 HOURS PRN
Qty: 12 TABLET | Refills: 0 | Status: SHIPPED | OUTPATIENT
Start: 2022-09-14 | End: 2022-09-17

## 2022-09-14 ASSESSMENT — ACTIVITIES OF DAILY LIVING (ADL): ADLS_ACUITY_SCORE: 35

## 2022-09-14 NOTE — TELEPHONE ENCOUNTER
Provider Response to 2nd Level Triage Request    I have reviewed the RN documentation. My recommendation is:  refer to ER or UC - does not need OB triage based on description of symptoms

## 2022-09-14 NOTE — ED TRIAGE NOTES
Patient presents to the ED with complaints of low back pain for a week with pain radiating down her right leg. She is 29 weeks pregnant and has a Hx of sciatica.     Triage Assessment     Row Name 09/14/22 1238       Triage Assessment (Adult)    Airway WDL WDL       Respiratory WDL    Respiratory WDL WDL       Skin Circulation/Temperature WDL    Skin Circulation/Temperature WDL WDL       Cardiac WDL    Cardiac WDL WDL       Peripheral/Neurovascular WDL    Peripheral Neurovascular WDL WDL       Cognitive/Neuro/Behavioral WDL    Cognitive/Neuro/Behavioral WDL WDL

## 2022-09-14 NOTE — TELEPHONE ENCOUNTER
Call back to patient.    I advised her, per Dr Tillman, to be seen in the ED or UCC.  She stated that she would go to the ED now.  Pain is severe.    Will be seen in ED now.    Brenda Basurto RN  [facility] Nurse Advisor  9/14/2022 at 12:02 PM

## 2022-09-14 NOTE — ED PROVIDER NOTES
EMERGENCY DEPARTMENT ENCOUNTER      NAME: Ailin Calhoun  AGE: 27 year old female  YOB: 1995  MRN: [id number]  EVALUATION DATE & TIME: 9/14/2022 12:41 PM    PCP: Lainey Tillman    ED PROVIDER: Sukh Segovia PA-C      Chief Complaint   Patient presents with     Back Pain         FINAL IMPRESSION:  1. Sciatic nerve disease, right          MEDICAL DECISION MAKING:    Pertinent Labs & Imaging studies reviewed. (See chart for details)  27 year old female presents to the Emergency Department for evaluation of right-sided buttocks pain with radiation down the right leg.    After obtaining history present illness, reviewing vitals and examined the patient physical exam findings and description of illness is consistent with a right-sided night sciatic nerve issue.  Patient has been trying heat, ice, Tylenol without relief.  At this point time based on the fact that she is in her third trimester pregnancy I did asked the pharmacist to review recommendations on muscle relaxants.  Unfortunately did not seem that any were considered safe at this point in the pregnancy, we will prescribe a small amount Percocet to use for breakthrough pain, I will place urgent referral for outpatient follow-up through our spine clinic as I believe that physical therapy and physical medicine will likely be most useful for managing her symptoms.  I will also provide Lidoderm patch so that she can attempt to have this for pain relief as well.  Risks and benefits were thoroughly discussed, overall patient and  are comfortable to plan of care.  Patient did not complain of any type of lower abdominal cramping or vaginal bleeding therefore I did not feel that emergent work-up regarding the pregnancy was necessary.        ED COURSE    I met with the patient, obtained history, performed an initial exam, and discussed options and plan for diagnostics and treatment here in the ED.    At the conclusion of the encounter I  discussed the results of all of the tests and the disposition. The questions were answered. The patient or family acknowledged understanding and was agreeable with the care plan.     MEDICATIONS GIVEN IN THE EMERGENCY:  Medications - No data to display    NEW PRESCRIPTIONS STARTED AT TODAY'S ER VISIT  New Prescriptions    LIDOCAINE (LIDOCARE) 4 % PATCH    Place 1 patch onto the skin every 24 hours To prevent lidocaine toxicity, patient should be patch free for 12 hrs daily.    OXYCODONE-ACETAMINOPHEN (PERCOCET) 5-325 MG TABLET    Take 1 tablet by mouth every 6 hours as needed for pain            =================================================================    HPI    Patient information was obtained from:   Ailin Calhoun is a 27 year old female with a pertinent history of being 29 weeks pregnant, this is the patient's first pregnancy, who presents to this ED for evaluation of complaints of right buttocks pain with radiation down her right leg.  Patient reports that she is a previous history of sciatic nerve issues involving the left lower extremity so she is familiar with the symptoms.  She states that over the last week or so she has noticed increasing pain in her right gluteus area with burning and sharp type pain shooting down the posterior aspect of her right leg.  Symptoms are worsened by movement.  She does report symptoms of spasm that occur with attempts to move.  No complaints of focal lower back tenderness.  She did not denies any recent falls or trauma.  There is no reports of fever or chills.  She denies any saddle anesthesia or loss of bowel or bladder control.  No previous history of back surgeries.    With regards to the pregnancy the patient denies any lower abdominal cramping or vaginal bleeding.    Patient has been taking 1000 mg of Tylenol every 6 hours as well as alternating heat and ice without improvement.  She is also been focusing on range of motion exercises as well as  stretching.      REVIEW OF SYSTEMS   Review of Systems   Musculoskeletal:        Pain in the right buttocks with radiation down the right leg.   All other systems reviewed and are negative.         PAST MEDICAL HISTORY:  No past medical history on file.    PAST SURGICAL HISTORY:  No past surgical history on file.      CURRENT MEDICATIONS:    No current facility-administered medications for this encounter.    Current Outpatient Medications:      Lidocaine (LIDOCARE) 4 % Patch, Place 1 patch onto the skin every 24 hours To prevent lidocaine toxicity, patient should be patch free for 12 hrs daily., Disp: 10 patch, Rfl: 1     oxyCODONE-acetaminophen (PERCOCET) 5-325 MG tablet, Take 1 tablet by mouth every 6 hours as needed for pain, Disp: 12 tablet, Rfl: 0     albuterol (PROAIR HFA/PROVENTIL HFA/VENTOLIN HFA) 108 (90 Base) MCG/ACT inhaler, Inhale 2 puffs into the lungs every 6 hours, Disp: 18 g, Rfl: 3     Doxylamine Succinate, Sleep, (UNISOM PO), , Disp: , Rfl:      ondansetron (ZOFRAN ODT) 4 MG ODT tab, Take 1 tablet (4 mg) by mouth every 8 hours as needed for nausea or vomiting, Disp: 30 tablet, Rfl: 1     Prenatal Vit-Fe Fumarate-FA (PRENATAL PO), , Disp: , Rfl:      Pyridoxine HCl (B-6 PO), , Disp: , Rfl:      sertraline (ZOLOFT) 25 MG tablet, Take 1 tablet (25 mg) by mouth daily, Disp: 90 tablet, Rfl: 3      ALLERGIES:  Allergies   Allergen Reactions     Aspirin Difficulty breathing, Hives, Itching, Nausea, Nausea and Vomiting and Shortness Of Breath     Ibuprofen Difficulty breathing, Fatigue, Headache, Itching, Nausea, Nausea and Vomiting and Shortness Of Breath       FAMILY HISTORY:  Family History   Problem Relation Age of Onset     Depression Mother      Obesity Mother      Anxiety Disorder Mother      No Known Problems Father      Depression Sister      Anxiety Disorder Sister      Asthma Sister      Anxiety Disorder Maternal Grandmother      Depression Maternal Grandmother      Obesity Maternal Grandmother       No Known Problems Maternal Grandfather      No Known Problems Paternal Grandmother      No Known Problems Paternal Grandfather        SOCIAL HISTORY:   Social History     Socioeconomic History     Marital status:    Tobacco Use     Smoking status: Never Smoker     Smokeless tobacco: Never Used   Vaping Use     Vaping Use: Never used   Substance and Sexual Activity     Alcohol use: Not Currently     Drug use: Never     Sexual activity: Yes     Partners: Female   Social History Narrative     , No children    Nilton Pantoja MD           VITALS:  Patient Vitals for the past 24 hrs:   BP Pulse Resp SpO2 Height Weight   09/14/22 1236 (!) 162/89 112 18 98 % 1.829 m (6') 108.9 kg (240 lb)       PHYSICAL EXAM    Physical Exam  Vitals and nursing note reviewed.   Constitutional:       General: She is not in acute distress.     Appearance: She is normal weight. She is not toxic-appearing.   Eyes:      Conjunctiva/sclera: Conjunctivae normal.   Pulmonary:      Effort: Pulmonary effort is normal. No respiratory distress.   Abdominal:      Comments: Protuberant abdomen consistent with pregnancy, nontender   Musculoskeletal:      Cervical back: Normal range of motion.      Comments: Tender to touch in the SI joint area, with radiation of pain down the posterior aspect of the right leg.  No midline bony tenderness to palpation of the lumbar spine.  No evidence of trauma to the posterior spine.  Patient is ambulatory under her own power but does appear uncomfortable.  No edema in the lower extremity.   Skin:     General: Skin is warm and dry.      Findings: No rash.   Neurological:      General: No focal deficit present.      Mental Status: She is alert. Mental status is at baseline.      Sensory: No sensory deficit.      Motor: No weakness.   Psychiatric:         Mood and Affect: Mood normal.          LAB:  All pertinent labs reviewed and interpreted.       RADIOLOGY:  Reviewed all pertinent imaging. Please see  official radiology report.  No orders to display           Sukh Segovia PA-C  Emergency Medicine  [facility]     Sukh Segovia PA-C  09/14/22 9284

## 2022-09-14 NOTE — TELEPHONE ENCOUNTER
OB Triage Call      Is patient's OB/Midwife with the formerly [facility] or [facility]? [facility]- Is patient's primary OB a Midwife? No- Proceed with triage.     Reason for call: Right side feels likes it is on fire. In severe pain. Has tried Tylenol, Heat, Stretching.  Pt in tears. Right sided back pain that goes all the way down her right leg, to her toes and back up to the right side, mid back.    Assessment:  29 wks pregnant. History of sciatica before the pregnancy. Has done PT for it in the past. Has been trying her exercises with no help. Pain on her right side, all the way down to her toes, and up to mid back. Denies vaginal bleeding, LOF. Good fetal movement. She denies abdominal pain, cramping, contractions.  Numbness in her right leg. Pain is on her right side, back, and buttock, down her right leg. Pain is severe. She rates the pain an 8/10 on the pain scale and is constant.    Plan:  To ED or UCC now or to office with PCP approval.    Patient plans to deliver at [facility]    Patient's primary OB Provider is  Dr Tillman    Per protocol recommendations: Pt to be seen in ED/UCC now or office with PCP approval.  I advised her that I would route a message to her care team, to see if she should be seen in the ED or OB triage.  29 wks.  Would [facility] or [facility] be better? Doesn't sound OB related, but cannot be ruled out completely.   I advised her that I would route a message to her care team and call her back. She is asking that I do that instead of her just going in.  Please advise.     Is patient's delivering hospital on divert? Does not apply due to Provider will contact patient to develop plan of care      29w0d    Estimated Date of Delivery: 2022        OB History    Para Term  AB Living   1 0 0 0 0 0   SAB IAB Ectopic Multiple Live Births   0 0 0 0 0      # Outcome Date GA Lbr Jayesh/2nd Weight Sex Delivery Anes PTL Lv   1 Current                No results found for: GBS       Brenda MEDRANO  Sherine 22 10:44 AM  [facility] Nurse Advisor    Reason for Disposition    Weakness of a leg or foot (e.g., unable to bear weight, dragging foot)    Additional Information    Negative: Shock suspected (e.g., cold/pale/clammy skin, too weak to stand, low BP, rapid pulse)    Negative: SEVERE abdominal pain    Negative: Sounds like a life-threatening emergency to the triager    Negative: Major injury to the back (e.g., MVA, fall > 10 feet or 3 meters, penetrating injury, etc.)    Negative: Having contractions or other symptoms of labor (such as vaginal pressure) and pregnant 37 or more weeks (i.e., term pregnancy)    Negative: Having contractions or other symptoms of labor (such as vaginal pressure) and < 37 weeks pregnant (i.e., )    Negative: Abdominal pain and pregnant 20 or more weeks    Negative: Abdominal pain and pregnant < 20 weeks    Negative: Pain in the upper back and overlies the rib cage    Negative: Pain in the upper back and worsened by coughing (or clearly increases with breathing)    Negative: Unable to urinate (or only a few drops) > 4 hours and bladder feels very full (e.g., palpable bladder or strong urge to urinate)    Negative: Numbness in groin or rectal area (i.e., loss of sensation)    Negative: Leakage of fluid from vagina    Negative: Pregnant 23 or more weeks and baby is moving less today (e.g., kick count < 5 in 1 hour or < 10 in 2 hours)    Protocols used: PREGNANCY - BACK PAIN-A-OH

## 2022-09-14 NOTE — ED NOTES
Patient discharged home with AVS and script for medications. Will take as prescribed and follow up with Spine clinic in [place]. Ambulated independently at discharge. Assessment per Krystian MILLER. See notes for details.

## 2022-09-14 NOTE — DISCHARGE INSTRUCTIONS
Please continue to alternate heat and ice to the affected areas that are causing symptoms, focus on stretching and range of motion exercises.  You may use 1000 mg of Tylenol up to 4 times a day for pain.  I have prescribed Percocet, this does have Tylenol in it so if you do take this please be considerate of that Tylenol dose.  Please use this very limitedly.  This can cause constipation I recommend that you take a stool softener with this.  I also prescribed a Lidoderm patch which she may use as needed.  I placed an urgent referral for outpatient follow-up through our spine care clinic in [place], please follow-up through them so that you can establish care, physical therapy could be extremely helpful for you in managing this during the end of your pregnancy.

## 2022-09-16 ENCOUNTER — HOSPITAL ENCOUNTER (INPATIENT)
Facility: CLINIC | Age: 27
LOS: 2 days | Discharge: HOME | DRG: 819 | End: 2022-09-18
Attending: EMERGENCY MEDICINE | Admitting: INTERNAL MEDICINE
Payer: COMMERCIAL

## 2022-09-16 ENCOUNTER — OFFICE VISIT (OUTPATIENT)
Dept: PHYSICAL MEDICINE AND REHAB | Facility: CLINIC | Age: 27
End: 2022-09-16
Payer: COMMERCIAL

## 2022-09-16 ENCOUNTER — HOSPITAL ENCOUNTER (OUTPATIENT)
Dept: MRI IMAGING | Facility: HOSPITAL | Age: 27
Discharge: HOME | End: 2022-09-16
Attending: NURSE PRACTITIONER | Admitting: NURSE PRACTITIONER
Payer: COMMERCIAL

## 2022-09-16 ENCOUNTER — TELEPHONE (OUTPATIENT)
Dept: FAMILY MEDICINE | Facility: CLINIC | Age: 27
End: 2022-09-16

## 2022-09-16 ENCOUNTER — OFFICE VISIT (OUTPATIENT)
Dept: NEUROSURGERY | Facility: CLINIC | Age: 27
End: 2022-09-16

## 2022-09-16 ENCOUNTER — TELEPHONE (OUTPATIENT)
Dept: NEUROSURGERY | Facility: CLINIC | Age: 27
End: 2022-09-16

## 2022-09-16 VITALS
HEIGHT: 72 IN | BODY MASS INDEX: 32.51 KG/M2 | HEART RATE: 93 BPM | OXYGEN SATURATION: 98 % | SYSTOLIC BLOOD PRESSURE: 112 MMHG | DIASTOLIC BLOOD PRESSURE: 83 MMHG | WEIGHT: 240 LBS

## 2022-09-16 VITALS — SYSTOLIC BLOOD PRESSURE: 119 MMHG | OXYGEN SATURATION: 98 % | DIASTOLIC BLOOD PRESSURE: 69 MMHG | HEART RATE: 96 BPM

## 2022-09-16 DIAGNOSIS — M51.26 LUMBAR HERNIATED DISC: ICD-10-CM

## 2022-09-16 DIAGNOSIS — R29.898 WEAKNESS OF RIGHT FOOT: ICD-10-CM

## 2022-09-16 DIAGNOSIS — M54.16 LUMBAR RADICULOPATHY, ACUTE: ICD-10-CM

## 2022-09-16 DIAGNOSIS — O99.891 MATERNAL BLOOD TRANSFUSION: ICD-10-CM

## 2022-09-16 DIAGNOSIS — Z11.52 ENCOUNTER FOR SCREENING LABORATORY TESTING FOR SEVERE ACUTE RESPIRATORY SYNDROME CORONAVIRUS 2 (SARS-COV-2): ICD-10-CM

## 2022-09-16 DIAGNOSIS — M79.18 MYOFASCIAL PAIN: ICD-10-CM

## 2022-09-16 DIAGNOSIS — G57.01 SCIATIC NERVE DISEASE, RIGHT: ICD-10-CM

## 2022-09-16 DIAGNOSIS — M21.371 RIGHT FOOT DROP: Primary | ICD-10-CM

## 2022-09-16 DIAGNOSIS — M54.16 LUMBAR RADICULOPATHY, ACUTE: Primary | ICD-10-CM

## 2022-09-16 DIAGNOSIS — M21.371 RIGHT FOOT DROP: ICD-10-CM

## 2022-09-16 DIAGNOSIS — Z3A.29 29 WEEKS GESTATION OF PREGNANCY: ICD-10-CM

## 2022-09-16 LAB
ABO/RH(D): NORMAL
ANTIBODY SCREEN: NEGATIVE
SARS-COV-2 RNA RESP QL NAA+PROBE: NEGATIVE
SPECIMEN EXPIRATION DATE: NORMAL

## 2022-09-16 PROCEDURE — 99285 EMERGENCY DEPT VISIT HI MDM: CPT | Performed by: EMERGENCY MEDICINE

## 2022-09-16 PROCEDURE — 99221 1ST HOSP IP/OBS SF/LOW 40: CPT | Mod: 57 | Performed by: NEUROLOGICAL SURGERY

## 2022-09-16 PROCEDURE — 72148 MRI LUMBAR SPINE W/O DYE: CPT

## 2022-09-16 PROCEDURE — 120N000002 HC R&B MED SURG/OB UMMC

## 2022-09-16 PROCEDURE — U0005 INFEC AGEN DETEC AMPLI PROBE: HCPCS | Performed by: EMERGENCY MEDICINE

## 2022-09-16 PROCEDURE — 99222 1ST HOSP IP/OBS MODERATE 55: CPT | Mod: AI | Performed by: INTERNAL MEDICINE

## 2022-09-16 PROCEDURE — 250N000013 HC RX MED GY IP 250 OP 250 PS 637: Performed by: INTERNAL MEDICINE

## 2022-09-16 PROCEDURE — 99214 OFFICE O/P EST MOD 30 MIN: CPT | Performed by: NURSE PRACTITIONER

## 2022-09-16 PROCEDURE — 250N000013 HC RX MED GY IP 250 OP 250 PS 637

## 2022-09-16 PROCEDURE — 99252 IP/OBS CONSLTJ NEW/EST SF 35: CPT | Mod: GC | Performed by: OBSTETRICS & GYNECOLOGY

## 2022-09-16 PROCEDURE — 99204 OFFICE O/P NEW MOD 45 MIN: CPT | Performed by: PAIN MEDICINE

## 2022-09-16 PROCEDURE — C9803 HOPD COVID-19 SPEC COLLECT: HCPCS | Performed by: EMERGENCY MEDICINE

## 2022-09-16 RX ORDER — ACETAMINOPHEN 325 MG/1
650 TABLET ORAL ONCE
Status: COMPLETED | OUTPATIENT
Start: 2022-09-16 | End: 2022-09-16

## 2022-09-16 RX ORDER — OXYCODONE HYDROCHLORIDE 5 MG/1
5 TABLET ORAL EVERY 4 HOURS PRN
Status: DISCONTINUED | OUTPATIENT
Start: 2022-09-16 | End: 2022-09-18 | Stop reason: HOSPADM

## 2022-09-16 RX ORDER — GABAPENTIN 100 MG/1
100 CAPSULE ORAL 3 TIMES DAILY
Qty: 90 CAPSULE | Refills: 0 | Status: SHIPPED | OUTPATIENT
Start: 2022-09-16 | End: 2022-10-17

## 2022-09-16 RX ORDER — ACETAMINOPHEN 325 MG/1
650 TABLET ORAL EVERY 4 HOURS PRN
Status: DISCONTINUED | OUTPATIENT
Start: 2022-09-17 | End: 2022-09-18 | Stop reason: HOSPADM

## 2022-09-16 RX ADMIN — ACETAMINOPHEN 325MG 650 MG: 325 TABLET ORAL at 19:45

## 2022-09-16 RX ADMIN — OXYCODONE HYDROCHLORIDE 5 MG: 5 TABLET ORAL at 22:45

## 2022-09-16 ASSESSMENT — ACTIVITIES OF DAILY LIVING (ADL)
ADLS_ACUITY_SCORE: 35
ADLS_ACUITY_SCORE: 35

## 2022-09-16 ASSESSMENT — PAIN SCALES - GENERAL: PAINLEVEL: EXTREME PAIN (8)

## 2022-09-16 NOTE — PROGRESS NOTES
ASSESSMENT: Ailin Calhoun is a 27 year old female who presents for consultation at the request of [facility] PCP Lainey Tillman, with a past medical history significant for low back and left lower extremity pain, asthma, heartburn, depression, insomnia who presents today for new patient evaluation of low back and right lower extremity pain and foot drop:    -Patient has had severe right lower extremity pain with weakness in her foot since 9/13/2022.  She is 29 weeks gestation.  I have discussed with neurosurgery.  They will see her today.  I have called Dr. Tillman her obstetrician and left a message so I can discuss with her.  Likely right L5 radiculopathy with foot drop.    Patient is neurologically intact on exam. No myelopathic or red flag symptoms.     Diagnoses and all orders for this visit:  Right foot drop  -     Neurosurgery Referral; Future  Sciatic nerve disease, right  -     Spine  Referral  -     Neurosurgery Referral; Future  Myofascial pain  -     Neurosurgery Referral; Future    PLAN:  Reviewed spine anatomy and disease process. Discussed diagnosis and treatment options with the patient today. A shared decision making model was used.  The patient's values and choices were respected. The following represents what was discussed and decided upon by the provider and the patient.      -DIAGNOSTIC TESTS:    -- We will discuss with Dr. Tillman regarding potential for MRI.    -PHYSICAL THERAPY: Could consider physical therapy.  Discussed the importance of core strengthening, ROM, stretching exercises with the patient and how each of these entities is important in decreasing pain.  Explained to the patient that the purpose of physical therapy is to teach the patient a home exercise program.  These exercises need to be performed every day in order to decrease pain and prevent future occurrences of pain.        -MEDICATIONS: We will discuss with Dr. Tillman possible  medications.  -  Discussed multiple medication options today with patient. Discussed risks, side effects, and proper use of medications. Patient verbalized understanding.    -INTERVENTIONS: No interventions at this time.  Discussed risks and benefits of injections with patient today.    -PATIENT EDUCATION: We discussed pain management in a multimodal fashion including physical therapy, medication management, possible future injections.  We discussed reasons for neurosurgery appointment.    -FOLLOW-UP:   Patient will follow up as needed    Advised patient to call the Spine Center if symptoms worsen or you have problems controlling bladder and bowel function.   ______________________________________________________________________    SUBJECTIVE:  HPI:  Ailin Calhoun  Is a 27 year old female who presents today for new patient evaluation of low back and right lower extremity pain.  The patient was seen by emergency department physician on 9/14/2022 with acute low back and right lower extremity pain.  She is in her third trimester pregnancy.  She was referred urgently to the spine center for further evaluation.  She was prescribed a small amount of Percocet.  Patient reports that she had severe pain starting on Tuesday of this week 9/13/2022.  She also noticed weakness in her foot at that time.  She is 29 weeks gestation.  She has been taking Percocet to daily and has not found this to be very helpful.  She is here with her  who is helpful in the planning process.  Her pain today is 8/10 is worst is 9/10 is best is 4/10.  She denies any bowel or bladder changes, fevers, chills, unintentional weight loss.  She has had physical therapy in the past for left sciatica type pain.  She has been doing the physical therapy exercises with no relief.  Pain goes down the right buttock posterior thigh right calf anterior shin and into the top of her foot.  Numbness and tingling associated area.    -Treatment to Date:  Physical therapy in the past for left leg pain    -Medications:    Current Outpatient Medications   Medication     albuterol (PROAIR HFA/PROVENTIL HFA/VENTOLIN HFA) 108 (90 Base) MCG/ACT inhaler     Doxylamine Succinate, Sleep, (UNISOM PO)     Lidocaine (LIDOCARE) 4 % Patch     ondansetron (ZOFRAN ODT) 4 MG ODT tab     oxyCODONE-acetaminophen (PERCOCET) 5-325 MG tablet     Prenatal Vit-Fe Fumarate-FA (PRENATAL PO)     Pyridoxine HCl (B-6 PO)     sertraline (ZOLOFT) 25 MG tablet     No current facility-administered medications for this visit.       Allergies   Allergen Reactions     Aspirin Difficulty breathing, Hives, Itching, Nausea, Nausea and Vomiting and Shortness Of Breath     Ibuprofen Difficulty breathing, Fatigue, Headache, Itching, Nausea, Nausea and Vomiting and Shortness Of Breath       Past medical history: Left lower extremity pain    Patient Active Problem List   Diagnosis     Mild persistent asthma without complication     Midline low back pain with left-sided sciatica, unspecified chronicity     Depression, unspecified depression type     Insomnia, unspecified type     Hx of abnormal cervical Pap smear     Heartburn       No past surgical history on file.    Family History   Problem Relation Age of Onset     Depression Mother      Obesity Mother      Anxiety Disorder Mother      No Known Problems Father      Depression Sister      Anxiety Disorder Sister      Asthma Sister      Anxiety Disorder Maternal Grandmother      Depression Maternal Grandmother      Obesity Maternal Grandmother      No Known Problems Maternal Grandfather      No Known Problems Paternal Grandmother      No Known Problems Paternal Grandfather        Reviewed past medical, surgical, and family history with patient found on new patient intake packet located in EMR Media tab.     SOCIAL HX: She denies smoking, drinking alcohol or using recreational drugs.    ROS:  Specifically negative for bowel/bladder dysfunction, balance  changes, headache, dizziness, foot drop, fevers, chills, appetite changes, nausea/vomiting, unexplained weight loss. Otherwise 13 systems reviewed are negative. Please see the patient's intake questionnaire from today for details.    OBJECTIVE:  /69   Pulse 96   LMP 02/23/2022   SpO2 98%     PHYSICAL EXAMINATION:    --CONSTITUTIONAL:  Vital signs as above.  No acute distress.  The patient is well nourished and well groomed.  --PSYCHIATRIC:  Appropriate mood and affect. The patient is awake, alert, oriented to person, place, time and answering questions appropriately with clear speech.    --SKIN:  Skin over the face is clean, dry, intact without rashes.    --RESPIRATORY: Normal rhythm and effort. No abnormal accessory muscle breathing patterns noted.   --ABDOMINAL:  Soft, non-distended, non-tender throughout all quadrants.   --STANDING EXAMINATION:  Normal lumbar lordosis noted, no lateral shift.  --MUSCULOSKELETAL: Lumbar spine inspection reveals no evidence of deformity. Range of motion is mildly limited in lumbar flexion, extension, lateral rotation.  Tenderness palpation across the low back. Straight leg raising in the supine position positive for radicular pain on the right.  --SACROILIAC JOINT:  One Finger point test negative.  --GROSS MOTOR: Foot drop on the right. Easily arises from a seated position.  She is able to toe walk, however not heel walk on the right.  --LOWER EXTREMITY MOTOR TESTING:  Plantar flexion left 5/5, right 5/5   Dorsiflexion left 5/5, right 3/5   Great toe MTP extension left 5/5, right 3/5  Knee flexion left 5/5, right 5/5  Knee extension left 5/5, right 5/5   Hip flexion left 5/5, right 5/5  Hip abduction left 5/5, right 5/5  Hip adduction left 5/5, right 5/5   --HIPS: Full range of motion bilaterally. Negative FABERs on the involved lower extremity.   --NEUROLOGICAL:  2/4 patellar, medial hamstring, and achilles reflexes bilaterally.  Sensation to light touch is intact in the  bilateral L4, L5, and S1 dermatomes. Babinski is negative. No clonus.  Negative Familia reflex bilaterally.  --VASCULAR:  2/4 dorsalis pedis pulses bilaterally.  Bilateral lower extremities are warm.      RESULTS: Prior medical records from [facility] emergency department physician were reviewed today.    Imaging: Lumbar spine Imaging was personally reviewed and interpreted today.     **Addendum to note.  Patient was seen by neurosurgery.  I also discussed with Dr. Tillman and she is okay with MRI being ordered if it does not have gadolinium in it.  We also discussed risks and benefits of gabapentin and she is okay with a low-dose of gabapentin.  I ordered gabapentin 100 mg 3 times daily and sent it to her pharmacy.

## 2022-09-16 NOTE — PATIENT INSTRUCTIONS
RLE pain x72hrs, R foot weakness x48hrs    Plan:  Stat MRI scan at 1pm at [facility]  We will review results and call with any urgent findings  Come to ER if any new weakness or change in bowel or bladder control  Continue pain mgmt plan per Dr Ivan Maxwell FNP-C  [facility] Neurosurgery  O. [phone]

## 2022-09-16 NOTE — PROGRESS NOTES
Neurosurgery clinic note:      A/P:  Ailin is a 28yo 29wks pregnant female here for new patient consult for 3day hx R lower back pain into R leg, R leg numbness, and R foot weakness x48+hrs. Urgently referred by Dr Love and same-day added to neurosurgery clinic.    Suspect lumbar radiculopathy based on presence and distribution of leg pain, however weakness of foot could be combination of L5, S1 vs peroneal neuropathy. Curious weakness of R quad as well. She has had no spinal imaging so far to confirm. OB provided clearance for stat noncontrast lumbar MRI study which she will go for now. Weakness of foot >48hrs which is outside of window for emergent surgery. Will review MRI results and decide plan. Dr Love will add low dose gabapentin. She may continue tylenol or percocet prn. Limited options dt pregnancy.    Case discussed with Dr Foster who agrees with plan.      Plan:  1. Stat MRI lumbar spine without contrast  2. Will follow up on results and d/w surgeon and plan to call patient with any urgent results  3. If MRI negative, would pursue testing for peroneal neuropathy      HPI:  Ailin is a 28yo F 29wk2d pregnant who presents for urgent same day add appt for right lumbar radiculopathy. She has a hx prior flares of left lumbar radiculopathy that have resolved with conservative treatment. Current symptoms began suddenly without injury three days ago on 9/13. Back pain into R posterior thigh, R shin and calf, and top and bottom of R foot. Weakness of the R foot as well as tingling and numbness from the knee down, shin/calf, and top/bottom of foot, and weakness began 9/14. Tried taking tylenol without relief. Went to the ER on 9/14 where she was given percocet and referred to the spine center. She is reluctant to take much medication however and so far a few doses of the percocet has not helped the pain beyond making her feel tired. Seen today by Dr Love in the spine center and weakness  appreciated on exam. States this began 48hrs ago. Denies left leg symptoms, saddle anesthesia, bowel or bladder changes. She has had no lumbar imaging so far. Has never had a lumbar MRI study in the past, prior steroid injections, or lumbar surgery. She reports normal fetal movement today    Dr Tillman is patient's OB. Dr Love was able to discuss situation with OB today who was comfortable with the safety of starting low dose gabapentin and non contrasted lumbar MRI study.     Exam:    General: lying on side in NAD, appears uncomfortable    Strength:  Full throughout left leg, all planes    5/5 R IP  4-/5 R quad  5/5 R hamstring  5/5 R dorsiflexion (with encourgement)  4+/5 R plantarflexion  2/5 R EHL    R internal and external rotation foot strength is 3/5  L internal and ext rotation of foot is 5/5    Sensory loss entire leg below R knee, both top and bottom of foot. Sensation of thighs and entire L leg intact.    Intact reflexes jd knee, achilles.    Negative clonus    + R straight leg raise    +  R SI joint tenderness  No vertebral point tenderness or paraspinal musculature tenderness    Able to stand on toes with some subj weakness R calf  Reports inability to stand on heel on R. Normal on L    Gait is antalgic   Reports inability to heel/toe walk    Imaging:  none        Claire SLAUGHTERP-C  [facility] Neurosurgery  O. [phone]

## 2022-09-16 NOTE — LETTER
9/16/2022         RE: Ailin Calhoun  [address] [address]        Dear Colleague,    Thank you for referring your patient, Ailin Calhoun, to the [facility] SPINE [facility] NEUROSURGERY. Please see a copy of my visit note below.    ASSESSMENT: Ailin Calhoun is a 27 year old female who presents for consultation at the request of [facility] PCP [place], Lainey Sánchez, with a past medical history significant for low back and left lower extremity pain, asthma, heartburn, depression, insomnia who presents today for new patient evaluation of low back and right lower extremity pain and foot drop:    -Patient has had severe right lower extremity pain with weakness in her foot since 9/13/2022.  She is 29 weeks gestation.  I have discussed with neurosurgery.  They will see her today.  I have called Dr. Tillman her obstetrician and left a message so I can discuss with her.  Likely right L5 radiculopathy with foot drop.    Patient is neurologically intact on exam. No myelopathic or red flag symptoms.     Diagnoses and all orders for this visit:  Right foot drop  -     Neurosurgery Referral; Future  Sciatic nerve disease, right  -     Spine  Referral  -     Neurosurgery Referral; Future  Myofascial pain  -     Neurosurgery Referral; Future    PLAN:  Reviewed spine anatomy and disease process. Discussed diagnosis and treatment options with the patient today. A shared decision making model was used.  The patient's values and choices were respected. The following represents what was discussed and decided upon by the provider and the patient.      -DIAGNOSTIC TESTS:    -- We will discuss with Dr. Tillman regarding potential for MRI.    -PHYSICAL THERAPY: Could consider physical therapy.  Discussed the importance of core strengthening, ROM, stretching exercises with the patient and how each of these entities is important in decreasing pain.  Explained to the patient that the purpose of  physical therapy is to teach the patient a home exercise program.  These exercises need to be performed every day in order to decrease pain and prevent future occurrences of pain.        -MEDICATIONS: We will discuss with Dr. Tillman possible medications.  -  Discussed multiple medication options today with patient. Discussed risks, side effects, and proper use of medications. Patient verbalized understanding.    -INTERVENTIONS: No interventions at this time.  Discussed risks and benefits of injections with patient today.    -PATIENT EDUCATION: We discussed pain management in a multimodal fashion including physical therapy, medication management, possible future injections.  We discussed reasons for neurosurgery appointment.    -FOLLOW-UP:   Patient will follow up as needed    Advised patient to call the Spine Center if symptoms worsen or you have problems controlling bladder and bowel function.   ______________________________________________________________________    SUBJECTIVE:  HPI:  Ailin Calhoun  Is a 27 year old female who presents today for new patient evaluation of low back and right lower extremity pain.  The patient was seen by emergency department physician on 9/14/2022 with acute low back and right lower extremity pain.  She is in her third trimester pregnancy.  She was referred urgently to the spine center for further evaluation.  She was prescribed a small amount of Percocet.  Patient reports that she had severe pain starting on Tuesday of this week 9/13/2022.  She also noticed weakness in her foot at that time.  She is 29 weeks gestation.  She has been taking Percocet to daily and has not found this to be very helpful.  She is here with her  who is helpful in the planning process.  Her pain today is 8/10 is worst is 9/10 is best is 4/10.  She denies any bowel or bladder changes, fevers, chills, unintentional weight loss.  She has had physical therapy in the past for left sciatica type pain.   She has been doing the physical therapy exercises with no relief.  Pain goes down the right buttock posterior thigh right calf anterior shin and into the top of her foot.  Numbness and tingling associated area.    -Treatment to Date: Physical therapy in the past for left leg pain    -Medications:    Current Outpatient Medications   Medication     albuterol (PROAIR HFA/PROVENTIL HFA/VENTOLIN HFA) 108 (90 Base) MCG/ACT inhaler     Doxylamine Succinate, Sleep, (UNISOM PO)     Lidocaine (LIDOCARE) 4 % Patch     ondansetron (ZOFRAN ODT) 4 MG ODT tab     oxyCODONE-acetaminophen (PERCOCET) 5-325 MG tablet     Prenatal Vit-Fe Fumarate-FA (PRENATAL PO)     Pyridoxine HCl (B-6 PO)     sertraline (ZOLOFT) 25 MG tablet     No current facility-administered medications for this visit.       Allergies   Allergen Reactions     Aspirin Difficulty breathing, Hives, Itching, Nausea, Nausea and Vomiting and Shortness Of Breath     Ibuprofen Difficulty breathing, Fatigue, Headache, Itching, Nausea, Nausea and Vomiting and Shortness Of Breath       Past medical history: Left lower extremity pain    Patient Active Problem List   Diagnosis     Mild persistent asthma without complication     Midline low back pain with left-sided sciatica, unspecified chronicity     Depression, unspecified depression type     Insomnia, unspecified type     Hx of abnormal cervical Pap smear     Heartburn       No past surgical history on file.    Family History   Problem Relation Age of Onset     Depression Mother      Obesity Mother      Anxiety Disorder Mother      No Known Problems Father      Depression Sister      Anxiety Disorder Sister      Asthma Sister      Anxiety Disorder Maternal Grandmother      Depression Maternal Grandmother      Obesity Maternal Grandmother      No Known Problems Maternal Grandfather      No Known Problems Paternal Grandmother      No Known Problems Paternal Grandfather        Reviewed past medical, surgical, and family  history with patient found on new patient intake packet located in [employer]MR [employer] tab.     SOCIAL HX: She denies smoking, drinking alcohol or using recreational drugs.    ROS:  Specifically negative for bowel/bladder dysfunction, balance changes, headache, dizziness, foot drop, fevers, chills, appetite changes, nausea/vomiting, unexplained weight loss. Otherwise 13 systems reviewed are negative. Please see the patient's intake questionnaire from today for details.    OBJECTIVE:  /69   Pulse 96   LMP 02/23/2022   SpO2 98%     PHYSICAL EXAMINATION:    --CONSTITUTIONAL:  Vital signs as above.  No acute distress.  The patient is well nourished and well groomed.  --PSYCHIATRIC:  Appropriate mood and affect. The patient is awake, alert, oriented to person, place, time and answering questions appropriately with clear speech.    --SKIN:  Skin over the face is clean, dry, intact without rashes.    --RESPIRATORY: Normal rhythm and effort. No abnormal accessory muscle breathing patterns noted.   --ABDOMINAL:  Soft, non-distended, non-tender throughout all quadrants.   --STANDING EXAMINATION:  Normal lumbar lordosis noted, no lateral shift.  --MUSCULOSKELETAL: Lumbar spine inspection reveals no evidence of deformity. Range of motion is mildly limited in lumbar flexion, extension, lateral rotation.  Tenderness palpation across the low back. Straight leg raising in the supine position positive for radicular pain on the right.  --SACROILIAC JOINT:  One Finger point test negative.  --GROSS MOTOR: Foot drop on the right. Easily arises from a seated position.  She is able to toe walk, however not heel walk on the right.  --LOWER EXTREMITY MOTOR TESTING:  Plantar flexion left 5/5, right 5/5   Dorsiflexion left 5/5, right 3/5   Great toe MTP extension left 5/5, right 3/5  Knee flexion left 5/5, right 5/5  Knee extension left 5/5, right 5/5   Hip flexion left 5/5, right 5/5  Hip abduction left 5/5, right 5/5  Hip adduction left 5/5,  right 5/5   --HIPS: Full range of motion bilaterally. Negative FABERs on the involved lower extremity.   --NEUROLOGICAL:  2/4 patellar, medial hamstring, and achilles reflexes bilaterally.  Sensation to light touch is intact in the bilateral L4, L5, and S1 dermatomes. Babinski is negative. No clonus.  Negative Familia reflex bilaterally.  --VASCULAR:  2/4 dorsalis pedis pulses bilaterally.  Bilateral lower extremities are warm.      RESULTS: Prior medical records from [facility] emergency department physician were reviewed today.    Imaging: Lumbar spine Imaging was personally reviewed and interpreted today.     **Addendum to note.  Patient was seen by neurosurgery.  I also discussed with Dr. Tillman and she is okay with MRI being ordered if it does not have gadolinium in it.  We also discussed risks and benefits of gabapentin and she is okay with a low-dose of gabapentin.  I ordered gabapentin 100 mg 3 times daily and sent it to her pharmacy.      Again, thank you for allowing me to participate in the care of your patient.        Sincerely,        Alonso Love, DO

## 2022-09-16 NOTE — TELEPHONE ENCOUNTER
Dr. Foster is wanting to let Dr. Tillman know that she admitted patient to the [facility] to be evaluated as high risk OB. If Dr. Tillman has any questions she can call s [facility] at [phone]

## 2022-09-16 NOTE — TELEPHONE ENCOUNTER
Neurosurgery telephone call    Spoke with patient. Relayed MRI results. Large disc severe central canal stenosis.    Plan per Dr Foster after discussion with multiple neurosurgeons including nsgy at [facility] is to admit patient for high risk OB opinion at [facility] re: risks of possible surgery, medical mgmt for disc herniation. Reassured patient disc in itself is not a danger to her baby. Risk of neurologic injury to be weighed. Patient agrees with plan. We will contact her once we hear more about possible admission    Claire BUCK  [facility] Neurosurgery  O. [phone]

## 2022-09-16 NOTE — TELEPHONE ENCOUNTER
Left message at phone number provided - ok to get MRI, avoid gadolinium-based contrast.    Dr Tillman

## 2022-09-16 NOTE — TELEPHONE ENCOUNTER
Pt called about possible admission; wants update on what is going on. Directed pt to call [facility] Neurosurgery clinic at [phone] - if clinic is closed ask page  to connect her w/ on-call for neurosurgery.

## 2022-09-16 NOTE — LETTER
9/16/2022         RE: Ailin Calhoun  [address]  [facility] [address]        Dear Colleague,    Thank you for referring your patient, Ailin Calhoun, to the [facility]. Please see a copy of my visit note below.      Neurosurgery clinic note:      A/P:  Ailin is a 26yo 29wks pregnant female here for new patient consult for 3day hx R lower back pain into R leg, R leg numbness, and R foot weakness x48+hrs. Urgently referred by Dr Love and same-day added to neurosurgery clinic.    Suspect lumbar radiculopathy based on presence and distribution of leg pain, however weakness of foot could be combination of L5, S1 vs peroneal neuropathy. Curious weakness of R quad as well. She has had no spinal imaging so far to confirm. OB provided clearance for stat noncontrast lumbar MRI study which she will go for now. Weakness of foot >48hrs which is outside of window for emergent surgery. Will review MRI results and decide plan. Dr Love will add low dose gabapentin. She may continue tylenol or percocet prn. Limited options dt pregnancy.    Case discussed with Dr Foster who agrees with plan.      Plan:  1. Stat MRI lumbar spine without contrast  2. Will follow up on results and d/w surgeon and plan to call patient with any urgent results  3. If MRI negative, would pursue testing for peroneal neuropathy      HPI:  Ailin is a 26yo F 29wk2d pregnant who presents for urgent same day add appt for right lumbar radiculopathy. She has a hx prior flares of left lumbar radiculopathy that have resolved with conservative treatment. Current symptoms began suddenly without injury three days ago on 9/13. Back pain into R posterior thigh, R shin and calf, and top and bottom of R foot. Weakness of the R foot as well as tingling and numbness from the knee down, shin/calf, and top/bottom of foot, and weakness began 9/14. Tried taking tylenol without relief. Went to the ER on 9/14 where she  was given percocet and referred to the spine center. She is reluctant to take much medication however and so far a few doses of the percocet has not helped the pain beyond making her feel tired. Seen today by Dr Love in the spine center and weakness appreciated on exam. States this began 48hrs ago. Denies left leg symptoms, saddle anesthesia, bowel or bladder changes. She has had no lumbar imaging so far. Has never had a lumbar MRI study in the past, prior steroid injections, or lumbar surgery. She reports normal fetal movement today    Dr Tillman is patient's OB. Dr Love was able to discuss situation with OB today who was comfortable with the safety of starting low dose gabapentin and non contrasted lumbar MRI study.     Exam:    General: lying on side in NAD, appears uncomfortable    Strength:  Full throughout left leg, all planes    5/5 R IP  4-/5 R quad  5/5 R hamstring  5/5 R dorsiflexion (with encourgement)  4+/5 R plantarflexion  2/5 R EHL    R internal and external rotation foot strength is 3/5  L internal and ext rotation of foot is 5/5    Sensory loss entire leg below R knee, both top and bottom of foot. Sensation of thighs and entire L leg intact.    Intact reflexes jd knee, achilles.    Negative clonus    + R straight leg raise    +  R SI joint tenderness  No vertebral point tenderness or paraspinal musculature tenderness    Able to stand on toes with some subj weakness R calf  Reports inability to stand on heel on R. Normal on L    Gait is antalgic   Reports inability to heel/toe walk    Imaging:  none        Claire Maxwell FNP-C  [facility] Neurosurgery  O. [phone]        Again, thank you for allowing me to participate in the care of your patient.        Sincerely,        Claire Maxwell, ARACELIS CNP

## 2022-09-16 NOTE — TELEPHONE ENCOUNTER
Reason for Call:  Other call back    Detailed comments: Dr Tejas Gamino from the spine center in [place] is calling because patient is at clinic being seen by him today and needed to talk to Dr Tillman about ordering an MRI, but would like to speak with Dr Tillman first before ordering the MRI given that patient is 29 weeks pregnant. Dr Gamino states that patient has acute drop foot. He said he also has neurosurgery here and that may take a look at the patient. There were no other OB providers in clinic to speak with Dr Love.     I told Dr Love that Dr Tillman was doing a delivery but should be back soon. I also did check with her CA as well and she should be in shortly    There is an office visit note in patients chart from Dr Love for todays visit.     Phone Number Patient can be reached at: Other phone number:  Dr Love personal cell number is [phone]. You can leave a message if he with other patients and he is unable to .    He said if possible, if there was a way to call Dr Tillman back on personal number or provide a personal number that would be best if he can't .     Best Time: anytime, but as soon as possible.     Can we leave a detailed message on this number? YES    Call taken on 9/16/2022 at 10:13 AM by Hortencia Viramontes

## 2022-09-16 NOTE — PATIENT INSTRUCTIONS
I ordered a neurosurgery consult for you.    I have called Dr. Tillman and I am awaiting a callback from her to discuss possible imaging and other plans.    ~Please call Nurse Navigation line [phone] with any questions or concerns about your treatment plan, if symptoms worsen and you would like to be seen urgently, or if you have problems controlling bladder and bowel function.

## 2022-09-17 ENCOUNTER — APPOINTMENT (OUTPATIENT)
Dept: GENERAL RADIOLOGY | Facility: CLINIC | Age: 27
DRG: 819 | End: 2022-09-17
Attending: INTERNAL MEDICINE
Payer: COMMERCIAL

## 2022-09-17 ENCOUNTER — APPOINTMENT (OUTPATIENT)
Dept: GENERAL RADIOLOGY | Facility: CLINIC | Age: 27
DRG: 819 | End: 2022-09-17
Attending: NEUROLOGICAL SURGERY
Payer: COMMERCIAL

## 2022-09-17 ENCOUNTER — ANESTHESIA EVENT (OUTPATIENT)
Dept: SURGERY | Facility: CLINIC | Age: 27
DRG: 819 | End: 2022-09-17
Payer: COMMERCIAL

## 2022-09-17 ENCOUNTER — ANESTHESIA (OUTPATIENT)
Dept: SURGERY | Facility: CLINIC | Age: 27
DRG: 819 | End: 2022-09-17
Payer: COMMERCIAL

## 2022-09-17 LAB
ANION GAP SERPL CALCULATED.3IONS-SCNC: 6 MMOL/L (ref 3–14)
ANION GAP SERPL CALCULATED.3IONS-SCNC: 7 MMOL/L (ref 3–14)
APTT PPP: 26 SECONDS (ref 22–38)
BASE EXCESS BLDA CALC-SCNC: -5.2 MMOL/L (ref -9.6–2)
BASOPHILS # BLD AUTO: 0.1 10E3/UL (ref 0–0.2)
BASOPHILS NFR BLD AUTO: 0 %
BLD PROD TYP BPU: NORMAL
BLD PROD TYP BPU: NORMAL
BLOOD COMPONENT TYPE: NORMAL
BLOOD COMPONENT TYPE: NORMAL
BUN SERPL-MCNC: 3 MG/DL (ref 7–30)
BUN SERPL-MCNC: 7 MG/DL (ref 7–30)
CA-I BLD-MCNC: 4.6 MG/DL (ref 4.4–5.2)
CALCIUM SERPL-MCNC: 8.1 MG/DL (ref 8.5–10.1)
CALCIUM SERPL-MCNC: 9.3 MG/DL (ref 8.5–10.1)
CHLORIDE BLD-SCNC: 108 MMOL/L (ref 94–109)
CHLORIDE BLD-SCNC: 109 MMOL/L (ref 94–109)
CO2 SERPL-SCNC: 24 MMOL/L (ref 20–32)
CO2 SERPL-SCNC: 25 MMOL/L (ref 20–32)
CODING SYSTEM: NORMAL
CODING SYSTEM: NORMAL
CREAT SERPL-MCNC: 0.51 MG/DL (ref 0.52–1.04)
CREAT SERPL-MCNC: 0.52 MG/DL (ref 0.52–1.04)
CROSSMATCH: NORMAL
CROSSMATCH: NORMAL
EOSINOPHIL # BLD AUTO: 0.4 10E3/UL (ref 0–0.7)
EOSINOPHIL NFR BLD AUTO: 4 %
ERYTHROCYTE [DISTWIDTH] IN BLOOD BY AUTOMATED COUNT: 13 % (ref 10–15)
ERYTHROCYTE [DISTWIDTH] IN BLOOD BY AUTOMATED COUNT: 13.1 % (ref 10–15)
GFR SERPL CREATININE-BSD FRML MDRD: >90 ML/MIN/1.73M2
GFR SERPL CREATININE-BSD FRML MDRD: >90 ML/MIN/1.73M2
GLUCOSE BLD-MCNC: 118 MG/DL (ref 70–99)
GLUCOSE BLD-MCNC: 136 MG/DL (ref 70–99)
GLUCOSE BLD-MCNC: 90 MG/DL (ref 70–99)
GLUCOSE BLDC GLUCOMTR-MCNC: 129 MG/DL (ref 70–99)
HCO3 BLDA-SCNC: 20 MMOL/L (ref 21–28)
HCT VFR BLD AUTO: 31 % (ref 35–47)
HCT VFR BLD AUTO: 32 % (ref 35–47)
HGB BLD-MCNC: 10.4 G/DL (ref 11.7–15.7)
HGB BLD-MCNC: 10.5 G/DL (ref 11.7–15.7)
HGB BLD-MCNC: 10.7 G/DL (ref 11.7–15.7)
IMM GRANULOCYTES # BLD: 0.2 10E3/UL
IMM GRANULOCYTES NFR BLD: 2 %
INR PPP: 0.98 (ref 0.85–1.15)
INR PPP: 0.99 (ref 0.85–1.15)
ISSUE DATE AND TIME: NORMAL
ISSUE DATE AND TIME: NORMAL
LACTATE BLD-SCNC: 1.3 MMOL/L
LYMPHOCYTES # BLD AUTO: 2 10E3/UL (ref 0.8–5.3)
LYMPHOCYTES NFR BLD AUTO: 18 %
MCH RBC QN AUTO: 27.6 PG (ref 26.5–33)
MCH RBC QN AUTO: 27.7 PG (ref 26.5–33)
MCHC RBC AUTO-ENTMCNC: 33.4 G/DL (ref 31.5–36.5)
MCHC RBC AUTO-ENTMCNC: 33.5 G/DL (ref 31.5–36.5)
MCV RBC AUTO: 82 FL (ref 78–100)
MCV RBC AUTO: 83 FL (ref 78–100)
MONOCYTES # BLD AUTO: 0.9 10E3/UL (ref 0–1.3)
MONOCYTES NFR BLD AUTO: 8 %
NEUTROPHILS # BLD AUTO: 7.7 10E3/UL (ref 1.6–8.3)
NEUTROPHILS NFR BLD AUTO: 68 %
NRBC # BLD AUTO: 0 10E3/UL
NRBC BLD AUTO-RTO: 0 /100
O2/TOTAL GAS SETTING VFR VENT: 37 %
PCO2 BLDA: 34 MM HG (ref 35–45)
PH BLDA: 7.37 [PH] (ref 7.35–7.45)
PLATELET # BLD AUTO: 220 10E3/UL (ref 150–450)
PLATELET # BLD AUTO: 243 10E3/UL (ref 150–450)
PO2 BLDA: 121 MM HG (ref 80–105)
POTASSIUM BLD-SCNC: 3.4 MMOL/L (ref 3.4–5.3)
POTASSIUM BLD-SCNC: 3.7 MMOL/L (ref 3.5–5)
POTASSIUM BLD-SCNC: 4.4 MMOL/L (ref 3.4–5.3)
RBC # BLD AUTO: 3.76 10E6/UL (ref 3.8–5.2)
RBC # BLD AUTO: 3.87 10E6/UL (ref 3.8–5.2)
SODIUM BLD-SCNC: 138 MMOL/L (ref 133–144)
SODIUM SERPL-SCNC: 139 MMOL/L (ref 133–144)
SODIUM SERPL-SCNC: 140 MMOL/L (ref 133–144)
UNIT ABO/RH: NORMAL
UNIT ABO/RH: NORMAL
UNIT NUMBER: NORMAL
UNIT NUMBER: NORMAL
UNIT STATUS: NORMAL
UNIT STATUS: NORMAL
UNIT TYPE ISBT: 5100
UNIT TYPE ISBT: 5100
WBC # BLD AUTO: 11.3 10E3/UL (ref 4–11)
WBC # BLD AUTO: 14.1 10E3/UL (ref 4–11)

## 2022-09-17 PROCEDURE — 250N000011 HC RX IP 250 OP 636: Performed by: NURSE ANESTHETIST, CERTIFIED REGISTERED

## 2022-09-17 PROCEDURE — 250N000011 HC RX IP 250 OP 636: Performed by: STUDENT IN AN ORGANIZED HEALTH CARE EDUCATION/TRAINING PROGRAM

## 2022-09-17 PROCEDURE — 710N000010 HC RECOVERY PHASE 1, LEVEL 2, PER MIN: Performed by: NEUROLOGICAL SURGERY

## 2022-09-17 PROCEDURE — 0SB20ZZ EXCISION OF LUMBAR VERTEBRAL DISC, OPEN APPROACH: ICD-10-PCS | Performed by: NEUROLOGICAL SURGERY

## 2022-09-17 PROCEDURE — 250N000011 HC RX IP 250 OP 636: Performed by: NEUROLOGICAL SURGERY

## 2022-09-17 PROCEDURE — 86923 COMPATIBILITY TEST ELECTRIC: CPT

## 2022-09-17 PROCEDURE — 250N000009 HC RX 250: Performed by: ANESTHESIOLOGY

## 2022-09-17 PROCEDURE — P9016 RBC LEUKOCYTES REDUCED: HCPCS

## 2022-09-17 PROCEDURE — 80048 BASIC METABOLIC PNL TOTAL CA: CPT | Performed by: EMERGENCY MEDICINE

## 2022-09-17 PROCEDURE — 250N000009 HC RX 250: Performed by: NEUROLOGICAL SURGERY

## 2022-09-17 PROCEDURE — 36415 COLL VENOUS BLD VENIPUNCTURE: CPT | Performed by: EMERGENCY MEDICINE

## 2022-09-17 PROCEDURE — 85025 COMPLETE CBC W/AUTO DIFF WBC: CPT | Performed by: EMERGENCY MEDICINE

## 2022-09-17 PROCEDURE — 01NB0ZZ RELEASE LUMBAR NERVE, OPEN APPROACH: ICD-10-PCS | Performed by: NEUROLOGICAL SURGERY

## 2022-09-17 PROCEDURE — 86901 BLOOD TYPING SEROLOGIC RH(D): CPT | Performed by: EMERGENCY MEDICINE

## 2022-09-17 PROCEDURE — 250N000013 HC RX MED GY IP 250 OP 250 PS 637: Performed by: STUDENT IN AN ORGANIZED HEALTH CARE EDUCATION/TRAINING PROGRAM

## 2022-09-17 PROCEDURE — 82330 ASSAY OF CALCIUM: CPT | Performed by: ORTHOPAEDIC SURGERY

## 2022-09-17 PROCEDURE — 250N000011 HC RX IP 250 OP 636: Performed by: ANESTHESIOLOGY

## 2022-09-17 PROCEDURE — 82803 BLOOD GASES ANY COMBINATION: CPT

## 2022-09-17 PROCEDURE — 999N000063 XR CROSSTABLE LATERAL LUMBAR SPINE PORTABLE

## 2022-09-17 PROCEDURE — 250N000011 HC RX IP 250 OP 636

## 2022-09-17 PROCEDURE — 36415 COLL VENOUS BLD VENIPUNCTURE: CPT | Performed by: INTERNAL MEDICINE

## 2022-09-17 PROCEDURE — 370N000017 HC ANESTHESIA TECHNICAL FEE, PER MIN: Performed by: NEUROLOGICAL SURGERY

## 2022-09-17 PROCEDURE — 96372 THER/PROPH/DIAG INJ SC/IM: CPT

## 2022-09-17 PROCEDURE — 99232 SBSQ HOSP IP/OBS MODERATE 35: CPT | Performed by: INTERNAL MEDICINE

## 2022-09-17 PROCEDURE — 250N000009 HC RX 250: Performed by: NURSE ANESTHETIST, CERTIFIED REGISTERED

## 2022-09-17 PROCEDURE — 999N000141 HC STATISTIC PRE-PROCEDURE NURSING ASSESSMENT: Performed by: NEUROLOGICAL SURGERY

## 2022-09-17 PROCEDURE — 250N000025 HC SEVOFLURANE, PER MIN: Performed by: NEUROLOGICAL SURGERY

## 2022-09-17 PROCEDURE — 120N000002 HC R&B MED SURG/OB UMMC

## 2022-09-17 PROCEDURE — 84132 ASSAY OF SERUM POTASSIUM: CPT | Performed by: ORTHOPAEDIC SURGERY

## 2022-09-17 PROCEDURE — 85610 PROTHROMBIN TIME: CPT | Performed by: INTERNAL MEDICINE

## 2022-09-17 PROCEDURE — 82330 ASSAY OF CALCIUM: CPT

## 2022-09-17 PROCEDURE — 250N000013 HC RX MED GY IP 250 OP 250 PS 637: Performed by: ANESTHESIOLOGY

## 2022-09-17 PROCEDURE — 272N000001 HC OR GENERAL SUPPLY STERILE: Performed by: NEUROLOGICAL SURGERY

## 2022-09-17 PROCEDURE — 250N000013 HC RX MED GY IP 250 OP 250 PS 637: Performed by: INTERNAL MEDICINE

## 2022-09-17 PROCEDURE — 84132 ASSAY OF SERUM POTASSIUM: CPT | Performed by: INTERNAL MEDICINE

## 2022-09-17 PROCEDURE — 258N000003 HC RX IP 258 OP 636: Performed by: NURSE ANESTHETIST, CERTIFIED REGISTERED

## 2022-09-17 PROCEDURE — 85730 THROMBOPLASTIN TIME PARTIAL: CPT | Performed by: EMERGENCY MEDICINE

## 2022-09-17 PROCEDURE — 85027 COMPLETE CBC AUTOMATED: CPT | Performed by: INTERNAL MEDICINE

## 2022-09-17 PROCEDURE — 85610 PROTHROMBIN TIME: CPT | Performed by: EMERGENCY MEDICINE

## 2022-09-17 PROCEDURE — 360N000077 HC SURGERY LEVEL 4, PER MIN: Performed by: NEUROLOGICAL SURGERY

## 2022-09-17 RX ORDER — ALBUTEROL SULFATE 90 UG/1
2 AEROSOL, METERED RESPIRATORY (INHALATION) EVERY 6 HOURS PRN
Status: DISCONTINUED | OUTPATIENT
Start: 2022-09-17 | End: 2022-09-18 | Stop reason: HOSPADM

## 2022-09-17 RX ORDER — FENTANYL CITRATE 50 UG/ML
INJECTION, SOLUTION INTRAMUSCULAR; INTRAVENOUS PRN
Status: DISCONTINUED | OUTPATIENT
Start: 2022-09-17 | End: 2022-09-17

## 2022-09-17 RX ORDER — PROPOFOL 10 MG/ML
INJECTION, EMULSION INTRAVENOUS CONTINUOUS PRN
Status: DISCONTINUED | OUTPATIENT
Start: 2022-09-17 | End: 2022-09-17

## 2022-09-17 RX ORDER — ONDANSETRON 2 MG/ML
4 INJECTION INTRAMUSCULAR; INTRAVENOUS EVERY 30 MIN PRN
Status: DISCONTINUED | OUTPATIENT
Start: 2022-09-17 | End: 2022-09-17 | Stop reason: HOSPADM

## 2022-09-17 RX ORDER — ONDANSETRON 2 MG/ML
INJECTION INTRAMUSCULAR; INTRAVENOUS PRN
Status: DISCONTINUED | OUTPATIENT
Start: 2022-09-17 | End: 2022-09-17

## 2022-09-17 RX ORDER — SODIUM CHLORIDE, SODIUM LACTATE, POTASSIUM CHLORIDE, CALCIUM CHLORIDE 600; 310; 30; 20 MG/100ML; MG/100ML; MG/100ML; MG/100ML
INJECTION, SOLUTION INTRAVENOUS CONTINUOUS PRN
Status: DISCONTINUED | OUTPATIENT
Start: 2022-09-17 | End: 2022-09-17

## 2022-09-17 RX ORDER — CEFAZOLIN SODIUM 2 G/100ML
2 INJECTION, SOLUTION INTRAVENOUS
Status: COMPLETED | OUTPATIENT
Start: 2022-09-17 | End: 2022-09-17

## 2022-09-17 RX ORDER — BETAMETHASONE SODIUM PHOSPHATE AND BETAMETHASONE ACETATE 3; 3 MG/ML; MG/ML
12 INJECTION, SUSPENSION INTRA-ARTICULAR; INTRALESIONAL; INTRAMUSCULAR; SOFT TISSUE EVERY 24 HOURS
Status: COMPLETED | OUTPATIENT
Start: 2022-09-17 | End: 2022-09-18

## 2022-09-17 RX ORDER — ALBUTEROL SULFATE 90 UG/1
1-2 AEROSOL, METERED RESPIRATORY (INHALATION) EVERY 6 HOURS PRN
Status: ON HOLD | COMMUNITY
End: 2022-12-02

## 2022-09-17 RX ORDER — SODIUM CHLORIDE, SODIUM LACTATE, POTASSIUM CHLORIDE, CALCIUM CHLORIDE 600; 310; 30; 20 MG/100ML; MG/100ML; MG/100ML; MG/100ML
INJECTION, SOLUTION INTRAVENOUS CONTINUOUS
Status: DISCONTINUED | OUTPATIENT
Start: 2022-09-17 | End: 2022-09-17 | Stop reason: HOSPADM

## 2022-09-17 RX ORDER — HYDROMORPHONE HCL IN WATER/PF 6 MG/30 ML
0.2 PATIENT CONTROLLED ANALGESIA SYRINGE INTRAVENOUS EVERY 5 MIN PRN
Status: DISCONTINUED | OUTPATIENT
Start: 2022-09-17 | End: 2022-09-17 | Stop reason: HOSPADM

## 2022-09-17 RX ORDER — DEXAMETHASONE SODIUM PHOSPHATE 4 MG/ML
INJECTION, SOLUTION INTRA-ARTICULAR; INTRALESIONAL; INTRAMUSCULAR; INTRAVENOUS; SOFT TISSUE PRN
Status: DISCONTINUED | OUTPATIENT
Start: 2022-09-17 | End: 2022-09-17

## 2022-09-17 RX ORDER — CITRIC ACID/SODIUM CITRATE 334-500MG
30 SOLUTION, ORAL ORAL ONCE
Status: COMPLETED | OUTPATIENT
Start: 2022-09-17 | End: 2022-09-17

## 2022-09-17 RX ORDER — IPRATROPIUM BROMIDE AND ALBUTEROL SULFATE 2.5; .5 MG/3ML; MG/3ML
3 SOLUTION RESPIRATORY (INHALATION) ONCE
Status: COMPLETED | OUTPATIENT
Start: 2022-09-17 | End: 2022-09-17

## 2022-09-17 RX ORDER — ONDANSETRON 4 MG/1
4 TABLET, ORALLY DISINTEGRATING ORAL EVERY 30 MIN PRN
Status: DISCONTINUED | OUTPATIENT
Start: 2022-09-17 | End: 2022-09-17 | Stop reason: HOSPADM

## 2022-09-17 RX ORDER — OXYCODONE HYDROCHLORIDE 5 MG/1
5 TABLET ORAL EVERY 4 HOURS PRN
Status: DISCONTINUED | OUTPATIENT
Start: 2022-09-17 | End: 2022-09-17 | Stop reason: HOSPADM

## 2022-09-17 RX ORDER — FENTANYL CITRATE 50 UG/ML
25 INJECTION, SOLUTION INTRAMUSCULAR; INTRAVENOUS EVERY 5 MIN PRN
Status: DISCONTINUED | OUTPATIENT
Start: 2022-09-17 | End: 2022-09-17 | Stop reason: HOSPADM

## 2022-09-17 RX ORDER — BUPIVACAINE HYDROCHLORIDE 2.5 MG/ML
INJECTION, SOLUTION INFILTRATION; PERINEURAL PRN
Status: DISCONTINUED | OUTPATIENT
Start: 2022-09-17 | End: 2022-09-17 | Stop reason: HOSPADM

## 2022-09-17 RX ORDER — PROPOFOL 10 MG/ML
INJECTION, EMULSION INTRAVENOUS PRN
Status: DISCONTINUED | OUTPATIENT
Start: 2022-09-17 | End: 2022-09-17

## 2022-09-17 RX ORDER — METOCLOPRAMIDE HYDROCHLORIDE 5 MG/ML
10 INJECTION INTRAMUSCULAR; INTRAVENOUS
Status: COMPLETED | OUTPATIENT
Start: 2022-09-17 | End: 2022-09-17

## 2022-09-17 RX ORDER — LIDOCAINE 40 MG/G
CREAM TOPICAL
Status: DISCONTINUED | OUTPATIENT
Start: 2022-09-17 | End: 2022-09-18 | Stop reason: HOSPADM

## 2022-09-17 RX ORDER — SERTRALINE HYDROCHLORIDE 25 MG/1
25 TABLET, FILM COATED ORAL DAILY
Status: ON HOLD | COMMUNITY
End: 2022-12-02

## 2022-09-17 RX ORDER — MULTIVITAMIN WITH IRON
100 TABLET ORAL DAILY
COMMUNITY
End: 2023-01-18

## 2022-09-17 RX ORDER — CEFAZOLIN SODIUM 2 G/100ML
2 INJECTION, SOLUTION INTRAVENOUS SEE ADMIN INSTRUCTIONS
Status: DISCONTINUED | OUTPATIENT
Start: 2022-09-17 | End: 2022-09-17 | Stop reason: HOSPADM

## 2022-09-17 RX ADMIN — Medication 2 G: at 09:12

## 2022-09-17 RX ADMIN — Medication 20 MG: at 09:00

## 2022-09-17 RX ADMIN — PHENYLEPHRINE HYDROCHLORIDE 100 MCG: 10 INJECTION INTRAVENOUS at 09:15

## 2022-09-17 RX ADMIN — FENTANYL CITRATE 100 MCG: 50 INJECTION, SOLUTION INTRAMUSCULAR; INTRAVENOUS at 08:54

## 2022-09-17 RX ADMIN — IPRATROPIUM BROMIDE AND ALBUTEROL SULFATE 3 ML: 2.5; .5 SOLUTION RESPIRATORY (INHALATION) at 08:00

## 2022-09-17 RX ADMIN — PHENYLEPHRINE HYDROCHLORIDE 100 MCG: 10 INJECTION INTRAVENOUS at 09:06

## 2022-09-17 RX ADMIN — Medication 10 MG: at 09:18

## 2022-09-17 RX ADMIN — Medication 10 MG: at 11:07

## 2022-09-17 RX ADMIN — SODIUM CHLORIDE, POTASSIUM CHLORIDE, SODIUM LACTATE AND CALCIUM CHLORIDE: 600; 310; 30; 20 INJECTION, SOLUTION INTRAVENOUS at 09:04

## 2022-09-17 RX ADMIN — ACETAMINOPHEN 650 MG: 325 TABLET ORAL at 12:58

## 2022-09-17 RX ADMIN — DEXAMETHASONE SODIUM PHOSPHATE 10 MG: 4 INJECTION, SOLUTION INTRAMUSCULAR; INTRAVENOUS at 08:55

## 2022-09-17 RX ADMIN — Medication 10 MG: at 09:10

## 2022-09-17 RX ADMIN — FAMOTIDINE 20 MG: 10 INJECTION INTRAVENOUS at 07:33

## 2022-09-17 RX ADMIN — PHENYLEPHRINE HYDROCHLORIDE 100 MCG: 10 INJECTION INTRAVENOUS at 08:55

## 2022-09-17 RX ADMIN — PHENYLEPHRINE HYDROCHLORIDE 100 MCG: 10 INJECTION INTRAVENOUS at 09:37

## 2022-09-17 RX ADMIN — FENTANYL CITRATE 50 MCG: 50 INJECTION, SOLUTION INTRAMUSCULAR; INTRAVENOUS at 12:09

## 2022-09-17 RX ADMIN — HYDROMORPHONE HYDROCHLORIDE 0.3 MG: 1 INJECTION, SOLUTION INTRAMUSCULAR; INTRAVENOUS; SUBCUTANEOUS at 11:34

## 2022-09-17 RX ADMIN — BETAMETHASONE SODIUM PHOSPHATE AND BETAMETHASONE ACETATE 12 MG: 3; 3 INJECTION, SUSPENSION INTRA-ARTICULAR; INTRALESIONAL; INTRAMUSCULAR; SOFT TISSUE at 03:29

## 2022-09-17 RX ADMIN — FENTANYL CITRATE 50 MCG: 50 INJECTION, SOLUTION INTRAMUSCULAR; INTRAVENOUS at 10:29

## 2022-09-17 RX ADMIN — PHENYLEPHRINE HYDROCHLORIDE 100 MCG: 10 INJECTION INTRAVENOUS at 09:18

## 2022-09-17 RX ADMIN — SODIUM CHLORIDE, POTASSIUM CHLORIDE, SODIUM LACTATE AND CALCIUM CHLORIDE: 600; 310; 30; 20 INJECTION, SOLUTION INTRAVENOUS at 08:45

## 2022-09-17 RX ADMIN — HYDROMORPHONE HYDROCHLORIDE 0.2 MG: 1 INJECTION, SOLUTION INTRAMUSCULAR; INTRAVENOUS; SUBCUTANEOUS at 11:44

## 2022-09-17 RX ADMIN — Medication 50 MG: at 09:47

## 2022-09-17 RX ADMIN — Medication 10 MG: at 11:25

## 2022-09-17 RX ADMIN — PHENYLEPHRINE HYDROCHLORIDE 0.3 MCG/KG/MIN: 10 INJECTION INTRAVENOUS at 09:37

## 2022-09-17 RX ADMIN — ACETAMINOPHEN 650 MG: 325 TABLET ORAL at 20:29

## 2022-09-17 RX ADMIN — SODIUM CITRATE AND CITRIC ACID MONOHYDRATE 30 ML: 500; 334 SOLUTION ORAL at 07:32

## 2022-09-17 RX ADMIN — FENTANYL CITRATE 50 MCG: 50 INJECTION, SOLUTION INTRAMUSCULAR; INTRAVENOUS at 10:50

## 2022-09-17 RX ADMIN — SODIUM CHLORIDE, SODIUM LACTATE, POTASSIUM CHLORIDE, CALCIUM CHLORIDE: 600; 310; 30; 20 INJECTION, SOLUTION INTRAVENOUS at 10:38

## 2022-09-17 RX ADMIN — HYDROMORPHONE HYDROCHLORIDE 0.3 MG: 1 INJECTION, SOLUTION INTRAMUSCULAR; INTRAVENOUS; SUBCUTANEOUS at 10:01

## 2022-09-17 RX ADMIN — PHENYLEPHRINE HYDROCHLORIDE 100 MCG: 10 INJECTION INTRAVENOUS at 09:21

## 2022-09-17 RX ADMIN — OXYCODONE HYDROCHLORIDE 5 MG: 5 TABLET ORAL at 04:58

## 2022-09-17 RX ADMIN — PHENYLEPHRINE HYDROCHLORIDE 100 MCG: 10 INJECTION INTRAVENOUS at 09:28

## 2022-09-17 RX ADMIN — HYDROMORPHONE HYDROCHLORIDE 0.2 MG: 1 INJECTION, SOLUTION INTRAMUSCULAR; INTRAVENOUS; SUBCUTANEOUS at 09:45

## 2022-09-17 RX ADMIN — METOCLOPRAMIDE HYDROCHLORIDE 10 MG: 5 INJECTION INTRAMUSCULAR; INTRAVENOUS at 07:39

## 2022-09-17 RX ADMIN — PROPOFOL 150 MCG/KG/MIN: 10 INJECTION, EMULSION INTRAVENOUS at 09:30

## 2022-09-17 RX ADMIN — PHENYLEPHRINE HYDROCHLORIDE 200 MCG: 10 INJECTION INTRAVENOUS at 09:01

## 2022-09-17 RX ADMIN — PHENYLEPHRINE HYDROCHLORIDE 100 MCG: 10 INJECTION INTRAVENOUS at 09:05

## 2022-09-17 RX ADMIN — PROPOFOL 170 MG: 10 INJECTION, EMULSION INTRAVENOUS at 08:55

## 2022-09-17 RX ADMIN — ACETAMINOPHEN 650 MG: 325 TABLET ORAL at 01:56

## 2022-09-17 RX ADMIN — ONDANSETRON 4 MG: 2 INJECTION INTRAMUSCULAR; INTRAVENOUS at 11:50

## 2022-09-17 RX ADMIN — SODIUM CHLORIDE, SODIUM LACTATE, POTASSIUM CHLORIDE, CALCIUM CHLORIDE: 600; 310; 30; 20 INJECTION, SOLUTION INTRAVENOUS at 09:42

## 2022-09-17 RX ADMIN — PHENYLEPHRINE HYDROCHLORIDE 200 MCG: 10 INJECTION INTRAVENOUS at 09:39

## 2022-09-17 RX ADMIN — SUGAMMADEX 200 MG: 100 INJECTION, SOLUTION INTRAVENOUS at 12:12

## 2022-09-17 RX ADMIN — FENTANYL CITRATE 25 MCG: 50 INJECTION, SOLUTION INTRAMUSCULAR; INTRAVENOUS at 11:56

## 2022-09-17 RX ADMIN — Medication 10 MG: at 08:55

## 2022-09-17 RX ADMIN — FENTANYL CITRATE 25 MCG: 50 INJECTION, SOLUTION INTRAMUSCULAR; INTRAVENOUS at 12:02

## 2022-09-17 ASSESSMENT — ACTIVITIES OF DAILY LIVING (ADL)
ADLS_ACUITY_SCORE: 28
ADLS_ACUITY_SCORE: 35
ADLS_ACUITY_SCORE: 28
ADLS_ACUITY_SCORE: 35
ADLS_ACUITY_SCORE: 28
ADLS_ACUITY_SCORE: 23

## 2022-09-17 NOTE — ANESTHESIA POSTPROCEDURE EVALUATION
Patient: Ailin Calhoun    Procedure: Procedure(s):  HEMILAMINECTOMY, SPINE, LUMBAR, 1 LEVEL, WITH DISCECTOMY  LAMINECTOMY, SPINE, LUMBAR, 1 LEVEL       Anesthesia Type:  General    Note:  Disposition: Inpatient   Postop Pain Control: Uneventful            Sign Out: Well controlled pain   PONV: No   Neuro/Psych: Uneventful            Sign Out: Acceptable/Baseline neuro status   Airway/Respiratory: Uneventful            Sign Out: Acceptable/Baseline resp. status   CV/Hemodynamics: Uneventful            Sign Out: Acceptable CV status; No obvious hypovolemia; No obvious fluid overload   Other NRE: NONE   DID A NON-ROUTINE EVENT OCCUR? No           Last vitals:  Vitals Value Taken Time   /58 09/17/22 1257   Temp 36.9  C (98.4  F) 09/17/22 1221   Pulse 106 09/17/22 1259   Resp 14 09/17/22 1245   SpO2 93 % 09/17/22 1328   Vitals shown include unvalidated device data.    Electronically Signed By: Kristie Vu MD  September 17, 2022  1:29 PM

## 2022-09-17 NOTE — CONSULTS
Maternal Fetal Medicine Consult    Referring Physician: Dr. Simon  Reason for Consult: 29w pregnant w/ L4/5 disc herniation    HPI: Ailin Calhoun is a 27 year old  presenting with right lower leg weakness and back spasming. Pain started last week with back spasms and progressed to shooting pain down her legs and eventually to being unable to lift her right foot. She presented to the ED and was given pain medications and scheduled for follow-up MRI. This was completed and showed L4/L5 disc herniation and she was recommended to represent to the ED for evaluation and consultation with neurosurgery.    Obstetrically, she has no complaints. Denies vaginal bleeding, LOF, contractions. Feeling normal fetal movement.     Denies prior surgery, hypertension. Does have asthma.    OBHx:     PMH:   Past Medical History:   Diagnosis Date     Asthma        PSH: History reviewed. No pertinent surgical history.    Social Hx: Denies tobacco use, alcohol use, marijuana use, any other drug use    ROS:   Negative except per HPI    Objective:   /64   Pulse 88   Temp (!) 96.6  F (35.9  C) (Oral)   Resp 18   Wt 115.5 kg (254 lb 10.1 oz)   SpO2 94%   General: well appearing, appears uncomfortable with her back  Cardio: well perfused, regular rate  Pulm: Breathing comfortably on room air  Abdomen: Soft, non-tender, gravid  Extremities: trace edema, non-tender in BLE    Labs/Imaging:  Results for orders placed or performed during the hospital encounter of 22   Asymptomatic COVID-19 Virus (Coronavirus) by PCR Nasopharyngeal     Status: Normal    Specimen: Nasopharyngeal; Swab   Result Value Ref Range    SARS CoV2 PCR Negative Negative    Narrative    Testing was performed using the Xpert Xpress SARS-CoV-2 Assay on the   [employer]. Additional information about   this Emergency Use Authorization (EUA) assay can be found via the Lab   Guide. This test should be ordered for the detection  of SARS-CoV-2 in   individuals who meet SARS-CoV-2 clinical and/or epidemiological   criteria. Test performance is unknown in asymptomatic patients. This   test is for in vitro diagnostic use under the FDA EUA for   laboratories certified under CLIA to perform high complexity testing.   This test has not been FDA cleared or approved. A negative result   does not rule out the presence of PCR inhibitors in the specimen or   target RNA in concentration below the limit of detection for the   assay. The possibility of a false negative should be considered if   the patient's recent exposure or clinical presentation suggests   COVID-19. This test was validated by the [facility]. This laboratory is certified under the Clinical Laboratory Improvement Amendments of 1988 (CLIA-88) as qualified to perform high complexity laboratory testing.     INR     Status: Normal   Result Value Ref Range    INR 0.99 0.85 - 1.15   Partial thromboplastin time     Status: Normal   Result Value Ref Range    aPTT 26 22 - 38 Seconds   Basic metabolic panel     Status: Normal   Result Value Ref Range    Sodium 140 133 - 144 mmol/L    Potassium 3.4 3.4 - 5.3 mmol/L    Chloride 109 94 - 109 mmol/L    Carbon Dioxide (CO2) 24 20 - 32 mmol/L    Anion Gap 7 3 - 14 mmol/L    Urea Nitrogen 7 7 - 30 mg/dL    Creatinine 0.52 0.52 - 1.04 mg/dL    Calcium 9.3 8.5 - 10.1 mg/dL    Glucose 90 70 - 99 mg/dL    GFR Estimate >90 >60 mL/min/1.73m2   CBC with platelets and differential     Status: Abnormal   Result Value Ref Range    WBC Count 11.3 (H) 4.0 - 11.0 10e3/uL    RBC Count 3.87 3.80 - 5.20 10e6/uL    Hemoglobin 10.7 (L) 11.7 - 15.7 g/dL    Hematocrit 32.0 (L) 35.0 - 47.0 %    MCV 83 78 - 100 fL    MCH 27.6 26.5 - 33.0 pg    MCHC 33.4 31.5 - 36.5 g/dL    RDW 13.1 10.0 - 15.0 %    Platelet Count 243 150 - 450 10e3/uL    % Neutrophils 68 %    % Lymphocytes 18 %    % Monocytes 8 %    % Eosinophils 4 %    % Basophils 0 %     % Immature Granulocytes 2 %    NRBCs per 100 WBC 0 <1 /100    Absolute Neutrophils 7.7 1.6 - 8.3 10e3/uL    Absolute Lymphocytes 2.0 0.8 - 5.3 10e3/uL    Absolute Monocytes 0.9 0.0 - 1.3 10e3/uL    Absolute Eosinophils 0.4 0.0 - 0.7 10e3/uL    Absolute Basophils 0.1 0.0 - 0.2 10e3/uL    Absolute Immature Granulocytes 0.2 <=0.4 10e3/uL    Absolute NRBCs 0.0 10e3/uL   Adult Type and Screen     Status: None   Result Value Ref Range    ABO/RH(D) O POS     Antibody Screen Negative Negative    SPECIMEN EXPIRATION DATE [id number]    ABO/Rh type and screen     Status: None    Narrative    The following orders were created for panel order ABO/Rh type and screen.  Procedure                               Abnormality         Status                     ---------                               -----------         ------                     Adult Type and Screen[[id number]]                            Final result                 Please view results for these tests on the individual orders.   CBC with platelets differential     Status: Abnormal    Narrative    The following orders were created for panel order CBC with platelets differential.  Procedure                               Abnormality         Status                     ---------                               -----------         ------                     CBC with platelets and d...[[id number]]  Abnormal            Final result                 Please view results for these tests on the individual orders.        Assessment/Plan:   Ailin Calhoun is a 27 year old  at 29w3d by LMP c/w 9w0d US with L4/L5 disc herniation resulting in RLE weakness for which neurosurgery recommends surgery.    Recommendations for surgery  - Agree with proceeding with surgery as there is risk for permanent neurologic function in her right leg. Risks to the fetus do exist with general anesthesia, however generally it is overall tolerated and the risk of neurologic loss to the patient  outweighs risks to the fetus at this time. These risks were discussed with the patient and she agrees, though she is understandably nervous.  - Recommend bed with area open for gravid abdomen to hang down as we do not recommend prolonged pressure on the abdomen with prone position. Per neurosurgery, can use Hussein table.  - Discussed with neurosurgery that if the need for emergent  delivery is indicated, they will be able to rotate the patient to supine fairly quickly and we will be able to proceed with surgery. We will be available in this case for emergent delivery.    Monitoring recommendations  - Will plan to monitor with NST pre-op, and monitor throughout anesthesia induction. Once patient is proned, will monitor patient for ~5 minutes to ensure the fetus tolerates the position change. Will NOT plan to monitor through procedure. Will monitor patient again when she is supine, and then complete an NST postoperatively.    Preoperative recommendations specific to pregnancy  - Will give patient betamethasone for fetal lung maturity  - Patient consented for classical  section. Discussed expectations for surgery and recovery, including surgical risks of bleeding; need for transfusion; infection; injury to uterus, fallopian tubes, ovaries, bowel, bladder, nerves, blood vessels, ureters, or baby. Additionally discussed remote risk of hysterectomy in the case of uncontrollable bleeding. Discussed that we may have to make a vertical incision on the uterus, if this were the case she would need  sections for all future deliveries at ~36-37w gestation. Patient agreeable to proceed with surgery, written informed consent signed. Blood transfusion consent signed as well.    Discussed with Dr. Ferrell.    Mariely Pemberton MD  Obstetrics & Gynecology, PGY-3  2022 2:59 AM     Physician Attestation   I saw this patient with the resident and agree with the resident/fellow's findings and plan of care as  documented in the note.      I personally reviewed vital signs, medications, labs and imaging.    Key findings: 28 yo  at 29w3d admitted for further evaluation for L4/L5 disc herniation with radiculopathy and RLE weakness. Ailin has been evaluated by Neurosurgery team and surgery is recommended due to risk for permanent neurologic injury. Plan for laminectomy under general anesthesia this morning.    We discussed the plan for fetal monitoring during the case. Plan for fetal monitoring perioperatively, during induction of anesthesia, after maternal positioning for the case, as well as immediately postoperatively. We will also attempt to monitor the fetus intraoperatively as feasible, but this may be limited due to maternal positioning. If there is a change in maternal status intraoperatively then we will plan to perform fetal monitoring.     During positioning recommend avoiding pressure on maternal abdomen due to concern for compression of the aorta and IVC. We will be present during positioning and ensure that fetal monitoring is reassuring in the planned surgical position.     We discussed with Ailin that the risk for need for emergent delivery is very small, but if there are significant fetal concerns we would plan to perform emergency  section. Ailin has signed the surgical consent. She is aware that if delivery is performed that there is a risk for requiring classical incision.     Monica Ferrell MD  Date of Service (when I saw the patient): 22

## 2022-09-17 NOTE — PROGRESS NOTES
[facility]    Medicine Progress Note - Hospitalist Service, GOLD TEAM 18    Date of Admission:  9/16/2022    Assessment & Plan            26 y/o woman who has asthma and who is over 29 weeks pregnant. Patient presented with right leg pain, numbness and weakness that appears to be secondary to lumbar disc herniation with stenosis and right-sided radiculopathy.  Neurosurgery consulted.   POD # 0 s/p 1.  Right-sided L4-L5 microdiskectomy with decompression at the L4-L5 level with hemilaminectomy and decompression of the L5 nerve root on the right side.  2.  Use of intraoperative microscope.     P:  1.) Lumbar disc herniation with severe spinal stenosis and radiculopathy.   - Stable after surgery.   - Neurosurgery following.   - No NSAID's or Heparin.   - Pain control.   - Gentle hydration    2.) Asthma, compensated: Albuterol as needed.    3.) Pregnancy, reportedly 29 weeks 2 days: Will consult Maternal-Fetal Medicine.       Diet: Advance Diet as Tolerated: Clear Liquid Diet    DVT Prophylaxis: Pneumatic Compression Devices and Anti-embolisim stockings (TEDs)  Gomez Catheter: PRESENT, indication: /GI/GYN Pelvic Procedure  Central Lines: None  Cardiac Monitoring: None  Code Status: Full Code      Disposition Plan     Expected Discharge Date: 09/18/2022      Destination: home          The patient's care was discussed with the Patient.    Jose Juan Hernandez MD  Hospitalist Service, GOLD TEAM 18  [facility]  Securely message with the Vocera Web Console (learn more here)  Text page via [facility] Paging/Directory   Please see signed in provider for up to date coverage information      Clinically Significant Risk Factors Present on Admission                    # Obesity: Estimated body mass index is 34.53 kg/m  as calculated from the following:    Height as of this encounter: 1.829 m (6').    Weight as of this encounter: 115.5 kg (254  lb 10.1 oz).        ______________________________________________________________________    Interval History     I saw the patient after surgery.   She was pleasant.   No chest pain or palpitations.   No abdominal pain, nausea or vomit.   No fever or chills.     Data reviewed today: I reviewed all medications, new labs and imaging results over the last 24 hours. I personally reviewed no images or EKG's today.    Physical Exam   Vital Signs: Temp: 97.9  F (36.6  C) Temp src: Oral BP: 129/65 Pulse: 101   Resp: 16 SpO2: 93 % O2 Device: None (Room air) Oxygen Delivery: 8 LPM  Weight: 254 lbs 10.1 oz  General: Patient comfortable, NAD.  Eyes: No scleral icterus or conjunctival injection.   Heart: RRR, S1 S2 w/o murmurs.  Lungs: Breath sounds present. No crackles/wheezes heard.  Gastrointestinal: Abdomen soft, nontender.  Skin: Warm, dry.  Neuro: Alert, oriented. Moving all extremities.   Psych: Affect appropriate. Answers questions appropriately.     Data   Recent Labs   Lab 09/17/22  1121 09/17/22  0816 09/17/22  0028   WBC  --   --  11.3*   HGB 10.5*  --  10.7*   MCV  --   --  83   PLT  --   --  243   INR  --   --  0.99     --  140   POTASSIUM 3.7  --  3.4   CHLORIDE  --   --  109   CO2  --   --  24   BUN  --   --  7   CR  --   --  0.52   ANIONGAP  --   --  7   SRINIVAS  --   --  9.3   * 129* 90     Recent Results (from the past 24 hour(s))   XR Lumbar Spine Port 1 View    Narrative    EXAM: XR CROSSTABLE LATERAL LUMBAR SPINE PORTABLE  LOCATION: [facility]  DATE/TIME: 9/17/2022 10:17 AM    INDICATION: Intraoperative  COMPARISON: None.  TECHNIQUE: CR Lumbar Spine.      Impression    IMPRESSION: There is a surgical needle over the posterior soft tissues at the level of the L5-S1 disc space.   XR Lumbar Spine Port 1 View    Narrative    EXAM: XR CROSSTABLE LATERAL LUMBAR SPINE PORTABLE  LOCATION: [facility]  DATE/TIME:  9/17/2022 10:18 AM    INDICATION: Intraoperative  COMPARISON: None.  TECHNIQUE: CR Lumbar Spine.      Impression    IMPRESSION: There is a surgical probe over the posterior soft tissues with the distal tip the level of the L5-S1 disc space. There is a Claude device over the posterior soft tissues at the level of the L5 pedicles.     Medications       betamethasone acet & sod phos  12 mg Intramuscular Q24H     sodium chloride (PF)  3 mL Intracatheter Q8H

## 2022-09-17 NOTE — ADDENDUM NOTE
Addendum  created 09/17/22 135 by Kristie Vu MD    Attestation recorded in Intraprocedure, Child order released for a procedure order, Clinical Note Signed, Flowsheet accepted, Intraprocedure Attestations filed, Intraprocedure Blocks edited, SmartForm saved

## 2022-09-17 NOTE — ANESTHESIA PROCEDURE NOTES
Arterial Line Procedure Note    Pre-Procedure   Staff -        Anesthesiologist:  Kristie Vu MD       Performed By: anesthesiologist       Location: OR       Pre-Anesthestic Checklist: patient identified, IV checked, risks and benefits discussed, informed consent, monitors and equipment checked, pre-op evaluation and at physician/surgeon's request  Timeout:       Correct Patient: Yes        Correct Procedure: Yes        Correct Site: Yes        Correct Position: Yes   Line Placement:   This line was placed Post Induction  Procedure   Procedure: arterial line       Laterality: right       Insertion Site: radial.  Sterile Prep        Standard elements of sterile barrier followed  Insertion/Injection        Technique: Seldinger Technique and Nayan's test completed        Catheter Type/Size: 20 G, 12 cm  Narrative         Secured by: suture       Tegaderm dressing used.       Complications: None apparent,        Arterial waveform: Yes        IBP within 10% of NIBP: Yes

## 2022-09-17 NOTE — PLAN OF CARE
/69 (BP Location: Left arm)   Pulse 83   Temp 98.2  F (36.8  C) (Oral)   Resp 18   Wt 115.5 kg (254 lb 10.1 oz)   SpO2 96%     Pt admitted to unit at 0410 from [facility] ED. Pt came on bed. Pt is alert and oriented x4, able to make need known. On RA stable, denies SOB, denies distress, clear LS. denies lightheadedness, denies dizziness, denied CP. Pt is 29 weeks pregnant with herniated disc.will be going to surgery b/n 7-8 Am per conversation with ED RN. Rated pain 7/10. Taking tylenol and oxycodone. Pain radiates to right leg. Numbness and tingling to right leg. Voiding well per pt.  LBM 9/16/22. BS+.  Pt denies incontinence. Skin is intact. Presurgical bath done, pt took shower. R PIV Sl. NPO except meds. Pt denies N/V.   Pt went down for surgery at 0650.

## 2022-09-17 NOTE — H&P
[facility]    History and Physical - Hospitalist Service, GOLD TEAM        Date of Admission:  9/16/2022    Assessment & Plan     A: Patient is a 26 y/o woman who has asthma and who is over 29 weeks pregnant. Patient presented with right leg pain, numbness and weakness that appears to be secondary to lumbar disc herniation with stenosis and right-sided radiculopathy.    P:  1.) Lumbar disc herniation with severe spinal stenosis and radiculopathy: Patient to be seen by Neurosurgery. Acetaminophen and oxycodone as needed for pain control.  2.) Asthma, compensated: Albuterol as needed.  3.) Pregnancy, reportedly 29 weeks 2 days: Will consult Maternal-Fetal Medicine.      Diet: Regular diet  DVT Prophylaxis: SCD boots  Gomez Catheter: Not present  Central Lines: None  Cardiac Monitoring: None      Carlos Luu MD  Hospitalist Service, GOLD TEAM   [facility]  Securely message with the Vocera Web Console (learn more here)  Text page via [facility] Paging/Directory   Please see signed in provider for up to date coverage information      ______________________________________________________________________    Chief Complaint     Right leg pain and weakness; herniated disc    History of Present Illness     Patient is a 26 y/o woman who has asthma and who is over 29 weeks pregnant. Patient had onset of right leg pain and numbness on Tuesday (13-Sep-2022). Patient presented to an ED on 14-Sep-2022, was evaluated, was prescribed acetaminophen and percocet and was referred to Neurosurgery. Patient would have onset of right leg weakness on 15-Sep-2022. Patient was seen by Neurosurgery today and patient underwent MRI that showed an L4-L5 disc herniation with severe spinal stenosis. Patient was then directly admitted to the hospital.    Patient reported no urinary or stool incontinence. Patient reported having sciatica in the past but stated  that this was pain involving her left thigh. Patient noted no fever and no chills. Patient noted vomiting on Wednesday (14-Sep-2022) but not since. Patient noted no dyspnea.     Patient reports that this is her first pregnancy. Patient noted no other problems at this point in time.    Review of Systems    - 10 point review of systems unremarkable aside from what was mentioned in HPI    Past Medical History    - Patient reports asthma    Past Surgical History   - Patient reports no past surgery    Social History   - Patient reports being a nonsmoker, reports no alcohol use and reports no recreational drug use.       Family History   - Mother had spine problems  - Father's history is unknown    Prior to Admission Medications   None     Allergies   No Known Allergies    Physical Exam   Vital Signs: Temp: 97.6  F (36.4  C) Temp src: Tympanic BP: 110/75 Pulse: 95   Resp: 20 SpO2: 95 %      Weight: 254 lbs 10.1 oz    General: Patient comfortable, NAD.  Eyes: No scleral icterus or conjunctival injection.   Heart: RRR, S1 S2 w/o murmurs.  Lungs: Breath sounds present. No crackles/wheezes heard.  Gastrointestinal: Abdomen soft, nontender.  Musculoskeletal: No discomfort with palpation of back. No visible joint swelling or erythema,  Skin: Warm, dry.  Neuro: Straight leg raise test positive. Right foot weaker than left foot for plantarflexion and dorsiflexion.  Psych: Affect appropriate. Answers questions appropriately.

## 2022-09-17 NOTE — ANESTHESIA PREPROCEDURE EVALUATION
Anesthesia Pre-Procedure Evaluation    Patient: Ailin Calhoun   MRN: [id number] : 1995        Procedure : Procedure(s):  HEMILAMINECTOMY, SPINE, LUMBAR, 1 LEVEL, WITH DISCECTOMY  LAMINECTOMY, SPINE, LUMBAR, 1 LEVEL          Past Medical History:   Diagnosis Date     Asthma       History reviewed. No pertinent surgical history.   Allergies   Allergen Reactions     Aspirin      Ibuprofen       Social History     Tobacco Use     Smoking status: Not on file     Smokeless tobacco: Not on file   Substance Use Topics     Alcohol use: Not on file      Wt Readings from Last 1 Encounters:   22 115.5 kg (254 lb 10.1 oz)        Anesthesia Evaluation   Pt has not had prior anesthetic         ROS/MED HX  ENT/Pulmonary:     (+) Moderate Persistent, asthma Treatment: Inhaler prn and Inhaler daily,      Neurologic: Comment: L4-L5 disc herniation, right foot drop      Cardiovascular:       METS/Exercise Tolerance: >4 METS    Hematologic:     (+) anemia,     Musculoskeletal:  - neg musculoskeletal ROS     GI/Hepatic:     (+) GERD, Symptomatic,     Renal/Genitourinary:  - neg Renal ROS     Endo:  - neg endo ROS     Psychiatric/Substance Use:  - neg psychiatric ROS     Infectious Disease:  - neg infectious disease ROS     Malignancy:  - neg malignancy ROS     Other:      (+) Possibly pregnant, LMP: 29w4d ago, ,         Physical Exam    Airway        Mallampati: II   TM distance: > 3 FB   Neck ROM: full   Mouth opening: > 3 cm    Respiratory Devices and Support         Dental  no notable dental history         Cardiovascular   cardiovascular exam normal          Pulmonary   pulmonary exam normal                OUTSIDE LABS:  CBC:   Lab Results   Component Value Date    WBC 11.3 (H) 2022    HGB 10.7 (L) 2022    HCT 32.0 (L) 2022     2022     BMP:   Lab Results   Component Value Date     2022    POTASSIUM 3.4 2022    CHLORIDE 109 2022    CO2 24 2022    BUN 7  09/17/2022    CR 0.52 09/17/2022    GLC 90 09/17/2022     COAGS:   Lab Results   Component Value Date    PTT 26 09/17/2022    INR 0.99 09/17/2022     POC: No results found for: BGM, HCG, HCGS  HEPATIC: No results found for: ALBUMIN, PROTTOTAL, ALT, AST, GGT, ALKPHOS, BILITOTAL, BILIDIRECT, ASTER  OTHER:   Lab Results   Component Value Date    SRINIVAS 9.3 09/17/2022       Anesthesia Plan    ASA Status:  3, emergent    NPO Status:  ELEVATED Aspiration Risk/Unknown    Anesthesia Type: General.     - Airway: ETT   Induction: Intravenous, RSI, Propofol.   Maintenance: TIVA.   Techniques and Equipment:     - Airway: Video-Laryngoscope     - Lines/Monitors: 2nd IV, Arterial Line     Consents    Anesthesia Plan(s) and associated risks, benefits, and realistic alternatives discussed. Questions answered and patient/representative(s) expressed understanding.    - Discussed:     - Discussed with:  Patient      - Extended Intubation/Ventilatory Support Discussed: No.      - Patient is DNR/DNI Status: No    Use of blood products discussed: Yes.     - Discussed with: Patient.     Postoperative Care    Pain management: IV analgesics, Oral pain medications, Multi-modal analgesia.   PONV prophylaxis: Ondansetron (or other 5HT-3), Dexamethasone or Solumedrol, Background Propofol Infusion, Promethazine or metoclopramide     Comments:                Kristie Vu MD

## 2022-09-17 NOTE — PROGRESS NOTES
[facility], [place]  Neurosurgery Progress Note:  09/17/2022      Interval History: GEORGE. Admitted to medicine.     Assessment:  Ailin Calhoun is a 27 year old female with past medical history significant for asthma on albuterol who presented to ED for evaluation of right lower leg weakness and shooting pain with MRI lumbar spine demonstrating L4-5 disc herniation eccentric to right, with caudal migration and canal stenosis, with right foot drop.     Plan:  - Serial neuro exams  - Plan for OR this morning  - OB to monitor pre-op, during induction, and post-op  - Ok for betamethasone IM pre-op  - No spinal precautions necessary  - No steroids  - NPO  - Activity: up ad lucille  - DVT prophylaxis: SCDs  -----------------------------------  Camelia Maxwell MD  Neurosurgery resident, PGY-2  -----------------------------------    GENERAL: lying in bed, NAD, fetal monitor in place  PULM: breathing comfortably on room air  ABD: soft, non-distended  MSK: right lower back tenderness to palpation  NEUROLOGIC:  -- Awake; Alert; oriented x 3  -- Follows commands briskly  -- +repetition, calculation, and naming  -- Speech fluent, spontaneous. No aphasia or dysarthria.  -- no gaze preference. No apparent hemineglect.  Cranial Nerves:  -- visual fields full to confrontation, PERRL 3-2mm bilat and brisk, extraocular movements intact  -- face symmetrical, tongue midline  -- sensory V1-V3 intact bilaterally  -- palate elevates symmetrically, uvula midline  -- hearing grossly intact bilat  -- Trapezii 5/5 strength bilat symmetric  -- Cerebellar: Finger nose finger without dysmetria, intact rapid alternating motions bilaterally     Motor:  Normal bulk / tone; no tremor, rigidity, or bradykinesia.  No muscle wasting or fasciculations  No Pronator Drift       Delt Bi Tri Hand Flexion/  Extension Iliopsoas Quadriceps Hamstrings Tibialis Anterior Gastroc     C5 C6 C7 C8/T1 L2 L3 L4-S1 L4 S1   R 5 5 5 5 5 4 4 4 4    L 5 5 5 5 5 5 5 5 5      RLE exam pain limited, positive R straight leg test at 15 degrees with progressive worsening     Right dorsiflexion 2/5, Right EHL 2/5     Sensory:  intact to LT x 4 extremities, endorses numbness and less sensation in RLE- below knee anteriorly and posteriorly and top and bottom of foot     Denies saddle anesthesia     Rectal tone intact     Reflexes:       Bi Tri BR Daniela Pat Ach Bab     C5-6 C7-8 C6 UMN L2-4 S1 UMN   R 2+ 2+ 2+ Norm 2+ 2+ Norm   L 2+ 2+ 2+ Norm 2+ 2+ Norm      Gait: Deferred      Objective:   Temp:  [96.6  F (35.9  C)-97.6  F (36.4  C)] 96.6  F (35.9  C)  Pulse:  [88-95] 88  Resp:  [18-20] 18  BP: (110-117)/(64-75) 117/64  SpO2:  [94 %-95 %] 94 %  No intake/output data recorded.        LABS:  Recent Labs   Lab 09/17/22  0028      POTASSIUM 3.4   CHLORIDE 109   CO2 24   ANIONGAP 7   GLC 90   BUN 7   CR 0.52   SRINIVAS 9.3       Recent Labs   Lab 09/17/22  0028   WBC 11.3*   RBC 3.87   HGB 10.7*   HCT 32.0*   MCV 83   MCH 27.6   MCHC 33.4   RDW 13.1          IMAGING:  No results found for this or any previous visit (from the past 24 hour(s)).

## 2022-09-17 NOTE — ED TRIAGE NOTES
Patient reports herniated disc that was diagnosed today.  Patient states she needs to be monitored because of how large it is and worsening symptoms due to her pregnancy.   Patient c/o right leg is numb and tingling.  Patient 26 weeks pregnant.   Otherwise healthy.       Triage Assessment     Row Name 09/16/22 1935       Triage Assessment (Adult)    Airway WDL WDL       Respiratory WDL    Respiratory WDL WDL       Skin Circulation/Temperature WDL    Skin Circulation/Temperature WDL WDL       Cardiac WDL    Cardiac WDL WDL       Peripheral/Neurovascular WDL    Peripheral Neurovascular WDL WDL       Cognitive/Neuro/Behavioral WDL    Cognitive/Neuro/Behavioral WDL WDL

## 2022-09-17 NOTE — PROGRESS NOTES
Neurosurgery note  2022 at 7:30 AM    Mrs. Calhoun is a 27-year-old right-handed female who is 29 weeks pregnant.  She presented to the [facility] this week after presenting with 2 days of right leg numbness that progressed to weakness to the point where she can barely dorsiflex her right ankle.  MRI demonstrated a large L4-5 disc herniation with caudal extension compressing the right L5 exiting nerve root.  Given the complexity of her case and current pregnancy, she was sent to the [facility] for further management.  Here, she has been seen by the high risk OB team as well as anesthesia for assessments.  We have discussed the risk and benefits of microdiscectomy either through a brandy laminectomy or a full laminectomy given the large size of disc and midline location, and intraoperative x-ray localization.  We also discussed the risks in the setting of her current pregnancy.  Risks include loss of the pregnancy, need for emergent , infection, neurologic dysfunction, worsening neurologic deficit, bladder or bowel dysfunction, lower extremity weakness and numbness, failure to improve symptoms, spinal instability requiring stabilization surgery and disc herniation recurrence.  I met with Ailin and her  for this full discussion and they asked excellent questions which I answered to the best of my ability, indicating their good understanding of our conversation today.  She understands the high risk nature of the surgery during pregnancy and would like to proceed.  We will perform the surgery on the Hussein table so that the abdomen can be monitored and [place] free to avoid compression of the IVC.  Anesthesia raise concerns about having the abdomen compressed in prone position, and we (neurosurgery in conference with anesthesia and OB) have all agreed that the Hussein table will be superior to the Paxton frame or gel rolls to mitigate the risk of this.  Prone positioning is superior to  lateral positioning for this procedure given the midline nature of the disc herniation.    Barrie Owen MD

## 2022-09-17 NOTE — ED NOTES
Bed: IN10  Expected date: 9/16/22  Expected time:   Means of arrival:   Comments:  MRN [id number] Ailin Calhoun 27F. Pregnant patient (29 weeks) with herniated disc. R leg pain and numbness. Concern for progression. May need operative management. Needs inpatient NSG consultation No direct [facility]. Plan to [place]  [facility] with OB c/s and NSG c/s. NSG to see in ER. Will need OB RN to come monitor baby.      Cristina Simon MD  09/16/22 [id number]

## 2022-09-17 NOTE — PLAN OF CARE
1500--1900:    Patient had spinal surgery today and came back from PACU around 1500.   Alert and Ox4,   VSS.   Spinal incision site Tegaderm dressing CDI.   C/o sore at incision site, managed well with ice pack.     Denied chest pain, SOB, nausea, or N/T.   R foot drop and weakness compared to left foot, which is her baseline.     Good appetite on regular diet. Passing gas. LBM 9/16.    L  & R PIV, SL.    Pt is on 29 weeks pregnancy, L & D RN will come monitor baby 3 times daily.     Continue with POC.

## 2022-09-17 NOTE — CONSULTS
[facility]       NEUROSURGERY CONSULTATION NOTE    This consultation was requested by Dr. Simon from the Emergency service.    Reason for Consultation: L4/5 disc herniation with right lower extremity weakness and radiculopathy    HPI: Ailin Calhoun is a 27 year old female with past medical history significant for asthma on albuterol who presented to ED for evaluation of right lower leg weakness and shooting pain with MRI lumbar spine demonstrating L4-5 disc herniation with canal stenosis for which neurosurgery was consulted    Patient states that she is an  and was on her feet a lot and lifting a lot this past weekend.  Tuesday morning she woke up with shooting pain in her right buttocks and down her right lateral thigh.  She presented to the emergency department and was given pain medication and discharged with plan for imaging at [facility] Friday morning.  On Wednesday she noticed development of some sensory changes in her right leg with numbness below the knee in the front and back and the top and bottom of her foot.  She states that Thursday morning she noticed that her right foot was dragging as she was walking, and she was unable to lift her toes easily.  After presenting for imaging Friday morning she was instructed to present to [facility] ED for evaluation and consultation by neurosurgery.    She denies any past back problems or back surgeries.  She denies taking any anticoagulation or antiplatelet medications.  She currently denies any other symptoms including bowel or bladder incontinence, saddle anesthesia, weakness on her left, tingling, numbness or sensory changes on the left, headache, nausea, vision changes, or any other neurological deficits.      PAST MEDICAL HISTORY: History reviewed. No pertinent past medical history.   Denies any except for Asthma, take Albuterol    PAST SURGICAL HISTORY: History  reviewed. No pertinent surgical history.   None    FAMILY HISTORY: No family history on file.    SOCIAL HISTORY:   Social History     Tobacco Use     Smoking status: Not on file     Smokeless tobacco: Not on file   Substance Use Topics     Alcohol use: Not on file       MEDICATIONS:  (Not in a hospital admission)  Albuterol  Denies ASA or anticoagulation    Allergies:  No Known Allergies    ROS: 10 point ROS were all negative except for pertinent positives noted in my HPI.    Physical exam:   Blood pressure 110/75, pulse 95, temperature 97.6  F (36.4  C), temperature source Tympanic, resp. rate 20, weight 115.5 kg (254 lb 10.1 oz), SpO2 95 %.  CV: HR and BP as noted above  GENERAL: lying in bed, NAD, fetal monitor in place  PULM: breathing comfortably on room air  ABD: soft, non-distended  MSK: right lower back tenderness to palpation  NEUROLOGIC:  -- Awake; Alert; oriented x 3  -- Follows commands briskly  -- +repetition, calculation, and naming  -- Speech fluent, spontaneous. No aphasia or dysarthria.  -- no gaze preference. No apparent hemineglect.  Cranial Nerves:  -- visual fields full to confrontation, PERRL 3-2mm bilat and brisk, extraocular movements intact  -- face symmetrical, tongue midline  -- sensory V1-V3 intact bilaterally  -- palate elevates symmetrically, uvula midline  -- hearing grossly intact bilat  -- Trapezii 5/5 strength bilat symmetric  -- Cerebellar: Finger nose finger without dysmetria, intact rapid alternating motions bilaterally    Motor:  Normal bulk / tone; no tremor, rigidity, or bradykinesia.  No muscle wasting or fasciculations  No Pronator Drift     Delt Bi Tri Hand Flexion/  Extension Iliopsoas Quadriceps Hamstrings Tibialis Anterior Gastroc    C5 C6 C7 C8/T1 L2 L3 L4-S1 L4 S1   R 5 5 5 5 5 4 4 4 4   L 5 5 5 5 5 5 5 5 5     RLE exam pain limited, positive R straight leg test at 15 degrees with progressive worsening    Right dorsiflexion 2/5, Right EHL 2/5    Sensory:  intact to LT  x 4 extremities, endorses numbness and less sensation in RLE- below knee anteriorly and posteriorly and top and bottom of foot    Denies saddle anesthesia    Rectal tone intact    Reflexes:     Bi Tri BR Daniela Pat Ach Bab    C5-6 C7-8 C6 UMN L2-4 S1 UMN   R 2+ 2+ 2+ Norm 2+ 2+ Norm   L 2+ 2+ 2+ Norm 2+ 2+ Norm     Gait: Deferred      LABS:  No lab results found in last 7 days.    No lab results found in last 7 days.      IMAGING:  MRI Lumbar spine w/out 9/16/2022  FINDINGS:   5 lumbar vertebrae. Normal alignment. Vertebral body heights are maintained. No pathologic marrow signal. Normal vertebral body heights, alignment and marrow signal. Normal distal spinal cord and cauda equina with conus medullaris at L1. No extraspinal   abnormality. Unremarkable visualized bony pelvis.     T12-L1: Normal disc height and signal. No herniation. Normal facets. No spinal canal or neural foraminal stenosis.      L1-L2: Normal disc height and signal. No herniation. Normal facets. No spinal canal or neural foraminal stenosis.     L2-L3: Normal disc height and signal. No herniation. Normal facets. No spinal canal or neural foraminal stenosis.      L3-L4: Mild disc height loss. Disc desiccation. Disc bulge. Mild facet arthropathy. Mild spinal canal stenosis. No neural foraminal stenosis.     L4-L5: Mild disc height loss. Disc desiccation. Central extrusion with pedicular caudal migration. Mild facet arthropathy. Severe spinal canal stenosis. No neural foraminal stenosis.     L5-S1: Mild disc height loss. Disc desiccation. Broad central protrusion. Mild facet arthropathy. No spinal canal stenosis. No neural foraminal stenosis.                                                                      IMPRESSION:  1.  At L4-L5, central extrusion contributes to severe spinal canal stenosis.  2.  No additional high-grade spinal canal or neural foraminal stenosis.    ASSESSMENT:  Ailin Calhoun is a 27 year old female with past medical history  significant for asthma on albuterol who presented to ED for evaluation of right lower leg weakness and shooting pain with MRI lumbar spine demonstrating L4-5 disc herniation eccentric to right, with caudal migration and canal stenosis, with right foot drop.     Clinically Significant Risk Factors Present on Admission                    RECOMMENDATIONS:  - Plan to add on for OR in the morning, 9/17  - Plan discussed with OB/GYN who approved moving forward with surgery; they will be available for fetal monitoring  - No spinal precautions necessary  - No steroids  - NPO at midnight  - Activity: up ad lucille  - DVT prophylaxis: SCDs      The patient was discussed with Dr. Mensah, neurosurgery chief resident, and Dr. Owen, neurosurgery staff, and they agree with the above.    Camelia Maxwell MD, PhD  PGY-2 Neurosurgery  Pager: [phone]

## 2022-09-17 NOTE — BRIEF OP NOTE
[facility]    Brief Operative Note    Pre-operative diagnosis: Lumbar disc herniation [M51.26]  Post-operative diagnosis Same as pre-operative diagnosis    Procedure: Procedure(s):  HEMILAMINECTOMY, SPINE, LUMBAR, 1 LEVEL, WITH DISCECTOMY  LAMINECTOMY, SPINE, LUMBAR, 1 LEVEL  Surgeon: Surgeon(s) and Role:     * Barrie Owen MD - Primary     * Monica Ferrell MD - Assisting     * Ana María Mensah MD - Resident - Assisting  Anesthesia: General   Estimated Blood Loss: 10 mL from 9/17/2022  8:50 AM to 9/17/2022 12:19 PM      Drains: None  Specimens: * No specimens in log *  Findings:   Large disc fragment causing compression at L4-5. Baby monitor remained stable throughout.  Complications: None.  Implants: * No implants in log *

## 2022-09-17 NOTE — ANESTHESIA CARE TRANSFER NOTE
Patient: Ailin Calhoun    Procedure: Procedure(s):  HEMILAMINECTOMY, SPINE, LUMBAR, 1 LEVEL, WITH DISCECTOMY  LAMINECTOMY, SPINE, LUMBAR, 1 LEVEL       Diagnosis: Lumbar disc herniation [M51.26]  Diagnosis Additional Information: No value filed.    Anesthesia Type:   General     Note:    Oropharynx: oropharynx clear of all foreign objects and spontaneously breathing  Level of Consciousness: awake  Oxygen Supplementation: face mask  Level of Supplemental Oxygen (L/min / FiO2): 8  Independent Airway: airway patency satisfactory and stable  Dentition: dentition unchanged  Vital Signs Stable: post-procedure vital signs reviewed and stable  Report to RN Given: handoff report given  Patient transferred to: PACU  Comments: Labor and Delivery RN present and continuing fetal monitoring.  Fetal monitoring WNL (throughout surgery and in post-op).  Handoff Report: Identifed the Patient, Identified the Reponsible Provider, Reviewed the pertinent medical history, Discussed the surgical course, Reviewed Intra-OP anesthesia mangement and issues during anesthesia, Set expectations for post-procedure period and Allowed opportunity for questions and acknowledgement of understanding      Vitals:  Vitals Value Taken Time   /88 (arterial line) 09/17/22 1225   Temp 36.9  C (98.4  F) 09/17/22 1225   Pulse 118 09/17/22 1225   Resp 17 09/17/22 1225   SpO2 98 % 09/17/22 1225   Vitals shown include unvalidated device data.    Electronically Signed By: ARACELIS Ramey CRNA  September 17, 2022  12:41 PM

## 2022-09-17 NOTE — ANESTHESIA PROCEDURE NOTES
Airway       Patient location during procedure: OR       Procedure Start/Stop Times: 9/17/2022 8:58 AM and 9/17/2022 8:59 AM  Staff -        Anesthesiologist:  Kristie Vu MD       CRNA: Chester Thomas APRN CRNA       Performed By: CRNA  Consent for Airway        Urgency: elective  Indications and Patient Condition       Indications for airway management: hailey-procedural, airway protection and altered level of consciousness       Induction type:intravenous       Mask difficulty assessment: 0 - not attempted    Final Airway Details       Final airway type: endotracheal airway       Successful airway: ETT - single  Endotracheal Airway Details        ETT size (mm): 6.5       Cuffed: yes       Successful intubation technique: video laryngoscopy       VL Blade Size: MAC 3       Grade View of Cords: 1       Adjucts: stylet       Position: Right       Measured from: gums/teeth       Secured at (cm): 23       Bite block used: Soft    Post intubation assessment        Placement verified by: capnometry, equal breath sounds and chest rise        Number of attempts at approach: 1       Secured with: pink tape       Ease of procedure: easy       Dentition: Intact and Unchanged    Medication(s) Administered   Medication Administration Time: 9/17/2022 8:58 AM

## 2022-09-17 NOTE — PROVIDER NOTIFICATION
09/17/22 0753   Provider Notification   Provider Name/Title Dr. Ferrell   Method of Notification At Bedside   Notification Reason Status Update   D: Pre-op NST. Dr. Ferrell at bedside reviewing the fetal heart rate tracing.

## 2022-09-17 NOTE — PROGRESS NOTES
PACU to Inpatient Nursing Handoff    Patient Ailin Calhoun is a 27 year old female who speaks English.   Procedure Procedure(s):  HEMILAMINECTOMY, SPINE, LUMBAR, 1 LEVEL, WITH DISCECTOMY  LAMINECTOMY, SPINE, LUMBAR, 1 LEVEL   Surgeon(s) Primary: Barrie Owen MD  Assisting: Monica Ferrell MD  Resident - Assisting: Ana María Mensah MD     Allergies   Allergen Reactions     Aspirin      Ibuprofen        Isolation  No active isolations     Past Medical History   has a past medical history of Asthma.    Anesthesia General   Dermatome Level     Preop Meds Duoneb    Nerve block Not applicable   Intraop Meds dexamethasone (Decadron)  fentanyl (Sublimaze): 300 mcg total  hydromorphone (Dilaudid): 1 mg total  ondansetron (Zofran): last given at 1150   Local Meds Yes   Antibiotics cefazolin (Ancef) - last given at 0912     Pain Patient Currently in Pain: yes   PACU meds  acetaminophen (Tylenol): 650 mg (total dose) last given at 1300    PCA / epidural No   Capnography  Yes   Telemetry     Inpatient Telemetry Monitor Ordered? No        Labs Glucose Lab Results   Component Value Date     09/17/2022     09/17/2022    GLC 90 09/17/2022       Hgb Lab Results   Component Value Date    HGB 10.5 09/17/2022    HGB 10.7 09/17/2022       INR Lab Results   Component Value Date    INR 0.99 09/17/2022      PACU Imaging Not applicable     Wound/Incision Incision/Surgical Site 09/17/22 Back (Active)   Number of days: 0      CMS  Denies n/t      Equipment ice pack   Other LDA       IV Access Peripheral IV 09/17/22 Right Lower forearm (Active)   Site Assessment [facility] 09/17/22 1221   Line Status Infusing 09/17/22 1221   Phlebitis Scale 0-->no symptoms 09/17/22 1221   Infiltration Scale 0 09/17/22 1221   Number of days: 0       Peripheral IV 09/17/22 Left Upper forearm (Active)   Site Assessment [facility] 09/17/22 1221   Line Status Saline locked 09/17/22 1221   Phlebitis Scale 0-->no symptoms 09/17/22 1221    Infiltration Scale 0 09/17/22 1221   Number of days: 0       Arterial Line 09/17/22 Radial (Active)   Site Assessment WDL 09/17/22 1221   Line Status Pulsatile blood flow 09/17/22 1221   Art Line Waveform Appropriate 09/17/22 1221   Art Line Interventions Zeroed and calibrated;Leveled 09/17/22 1221   Dressing Type Transparent 09/17/22 1221   Dressing Status Clean, dry, intact 09/17/22 1221   Number of days: 0      Blood Products Not applicable EBL 10 mL   Intake/Output Date 09/17/22 0700 - 09/18/22 0659   Shift 3000-7569 4313-5142 6791-6425 24 Hour Total   INTAKE   I.V. 2000 2000   Shift Total(mL/kg) 2000(17.32)   2000(17.32)   OUTPUT   Urine 350   350   Blood 10   10   Shift Total(mL/kg) 360(3.12)   360(3.12)   Weight (kg) 115.5 115.5 115.5 115.5      Drains / Gomez Urethral Catheter 09/17/22 Non-latex 16 fr (Active)   Collection Container Standard 09/17/22 1221   Securement Method Securing device (Describe) 09/17/22 1221   Number of days: 0      Time of void PreOp Void Prior to Procedure: 0530 (09/17/22 0658)    PostOp      Diapered? No   Bladder Scan     PO    tolerating sips and crackers     Vitals    B/P: 115/58  T: 98.4  F (36.9  C)    Temp src: Axillary  P:  Pulse: 112 (09/17/22 1245)          R: 14  O2:  SpO2: 93 %    O2 Device: None (Room air) (09/17/22 0412)    Oxygen Delivery: 8 LPM (09/17/22 1230)         Family/support present significant other   Patient belongings  In pt's room   Patient transported on cart   DC meds/scripts (obs/outpt) Not applicable   Inpatient Pain Meds Released? NA       Special needs/considerations Pt is 29weeks pregnant, will have L&D come monitor baby TID while in hospital.   Tasks needing completion None       Gauri Fitzpatrick, RN  ASCOM [id number]

## 2022-09-17 NOTE — ED PROVIDER NOTES
History     Chief Complaint   Patient presents with     Pregnancy Complications     Back Pain     HPI  Ailin Calhoun is a 27 year old otherwise healthy female who presents to the emergency department with a chief complaint of back pain.  The patient is currently 29 weeks pregnant.  She has been having pain radiating into her right hip/leg with associated numbness and tingling in her right leg.  She was seen today in outpatient neurosurgery clinic and had an MRI which showed a lumbar herniated disc at L4-L5 causing severe spinal canal stenosis.  Her care team is concerned about progression of this and felt that this may need operative intervention.  No inpatient beds available for direct admission, however, plan is for patient to be admitted to internal medicine team on the [facility] with neurosurgery and OB/GYN consultation.  See MRI from earlier today below.  Of note, patient has 2 charts, other MRN is [id number].    I have reviewed the Medications, Allergies, Past Medical and Surgical History, and Social History in the Epic system.    EXAM: MR LUMBAR SPINE W/O CONTRAST  LOCATION: [facility]  DATE/TIME: 9/16/2022 12:20 PM     INDICATION:  Lumbar radiculopathy, acute, Weakness of right foot  COMPARISON: None.  TECHNIQUE: Routine Lumbar Spine MRI without IV contrast.     FINDINGS:   5 lumbar vertebrae. Normal alignment. Vertebral body heights are maintained. No pathologic marrow signal. Normal vertebral body heights, alignment and marrow signal. Normal distal spinal cord and cauda equina with conus medullaris at L1. No extraspinal   abnormality. Unremarkable visualized bony pelvis.     T12-L1: Normal disc height and signal. No herniation. Normal facets. No spinal canal or neural foraminal stenosis.      L1-L2: Normal disc height and signal. No herniation. Normal facets. No spinal canal or neural foraminal stenosis.     L2-L3: Normal disc height and signal. No herniation. Normal facets.  No spinal canal or neural foraminal stenosis.      L3-L4: Mild disc height loss. Disc desiccation. Disc bulge. Mild facet arthropathy. Mild spinal canal stenosis. No neural foraminal stenosis.     L4-L5: Mild disc height loss. Disc desiccation. Central extrusion with pedicular caudal migration. Mild facet arthropathy. Severe spinal canal stenosis. No neural foraminal stenosis.     L5-S1: Mild disc height loss. Disc desiccation. Broad central protrusion. Mild facet arthropathy. No spinal canal stenosis. No neural foraminal stenosis.                                                                      IMPRESSION:  1.  At L4-L5, central extrusion contributes to severe spinal canal stenosis.  2.  No additional high-grade spinal canal or neural foraminal stenosis.    History reviewed. No pertinent past medical history.  History reviewed. No pertinent surgical history.  No current facility-administered medications for this encounter.     No current outpatient medications on file.     No Known Allergies  Past medical history, past surgical history, medications, and allergies were reviewed with the patient. Additional pertinent items: None    Social History     Socioeconomic History     Marital status: Not on file     Spouse name: Not on file     Number of children: Not on file     Years of education: Not on file     Highest education level: Not on file   Occupational History     Not on file   Tobacco Use     Smoking status: Not on file     Smokeless tobacco: Not on file   Substance and Sexual Activity     Alcohol use: Not on file     Drug use: Not on file     Sexual activity: Not on file   Other Topics Concern     Not on file   Social History Narrative     Not on file     Social Determinants of Health     Financial Resource Strain: Not on file   Food Insecurity: Not on file   Transportation Needs: Not on file   Physical Activity: Not on file   Stress: Not on file   Social Connections: Not on file   Intimate Partner  Violence: Not on file   Housing Stability: Not on file     Social history was reviewed with the patient. Additional pertinent items: None    Review of Systems  General: No fevers or chills  Skin: No rash or diaphoresis  Eyes: No eye redness or discharge  Ears/Nose/Throat: No rhinorrhea or nasal congestion  Respiratory: No cough or SOB  Cardiovascular: No chest pain or palpitations  Gastrointestinal: No nausea, vomiting, or diarrhea  Genitourinary: Positive for current pregnancy  Musculoskeletal: Positive for back/right hip/leg pain  Neurologic: See HPI  Hematologic/Lymphatic/Immunologic: No leg swelling, no easy bruising/bleeding  Endocrine: No polyuria/polydypsia    A complete review of systems was performed with pertinent positives and negatives noted in the HPI, and all other systems negative.    Physical Exam   BP: 110/75  Pulse: 95  Temp: 97.6  F (36.4  C)  Resp: 20  Weight: 115.5 kg (254 lb 10.1 oz)  SpO2: 95 %      General: Well nourished, well developed, NAD  HEENT: EOMI, anicteric. NCAT, MMM  Neck: no jugular venous distension, supple, nl ROM  Cardiac: Regular rate and rhythm.  Intact peripheral pulses  Pulm: Normal respiratory rate, nonlabored breathing  Back: Tenderness to palpation over right lumbar back and right hip  Skin: Warm and dry to the touch.  No rash  Extremities: No LE edema, no cyanosis, w/w/p, pain with movement of right leg  Neuro: A&Ox3, no gross focal deficits, bilateral lower extremities with intact sensation    ED Course        Procedures                          Labs Ordered and Resulted from Time of ED Arrival to Time of ED Departure - No data to display         Results for orders placed or performed during the hospital encounter of 09/16/22 (from the past 24 hour(s))   Asymptomatic COVID-19 Virus (Coronavirus) by PCR Nasopharyngeal    Specimen: Nasopharyngeal; Swab   Result Value Ref Range    SARS CoV2 PCR Negative Negative    Narrative    Testing was performed using the Xpert Xpress  SARS-CoV-2 Assay on the   Cepheid Gene-Xpert Instrument Systems. Additional information about   this Emergency Use Authorization (EUA) assay can be found via the Lab   Guide. This test should be ordered for the detection of SARS-CoV-2 in   individuals who meet SARS-CoV-2 clinical and/or epidemiological   criteria. Test performance is unknown in asymptomatic patients. This   test is for in vitro diagnostic use under the FDA EUA for   laboratories certified under CLIA to perform high complexity testing.   This test has not been FDA cleared or approved. A negative result   does not rule out the presence of PCR inhibitors in the specimen or   target RNA in concentration below the limit of detection for the   assay. The possibility of a false negative should be considered if   the patient's recent exposure or clinical presentation suggests   COVID-19. This test was validated by the [facility]. This laboratory is certified under the Clinical Laboratory Improvement Amendments of 1988 (CLIA-88) as qualified to perform high complexity laboratory testing.     INR   Result Value Ref Range    INR 0.99 0.85 - 1.15   Partial thromboplastin time   Result Value Ref Range    aPTT 26 22 - 38 Seconds   ABO/Rh type and screen    Narrative    The following orders were created for panel order ABO/Rh type and screen.  Procedure                               Abnormality         Status                     ---------                               -----------         ------                     Adult Type and Screen[[id number]]                            In process                   Please view results for these tests on the individual orders.   CBC with platelets differential    Narrative    The following orders were created for panel order CBC with platelets differential.  Procedure                               Abnormality         Status                     ---------                                -----------         ------                     CBC with platelets and d...[[id number]]  Abnormal            Final result                 Please view results for these tests on the individual orders.   Basic metabolic panel   Result Value Ref Range    Sodium 140 133 - 144 mmol/L    Potassium 3.4 3.4 - 5.3 mmol/L    Chloride 109 94 - 109 mmol/L    Carbon Dioxide (CO2) 24 20 - 32 mmol/L    Anion Gap 7 3 - 14 mmol/L    Urea Nitrogen 7 7 - 30 mg/dL    Creatinine 0.52 0.52 - 1.04 mg/dL    Calcium 9.3 8.5 - 10.1 mg/dL    Glucose 90 70 - 99 mg/dL    GFR Estimate >90 >60 mL/min/1.73m2   CBC with platelets and differential   Result Value Ref Range    WBC Count 11.3 (H) 4.0 - 11.0 10e3/uL    RBC Count 3.87 3.80 - 5.20 10e6/uL    Hemoglobin 10.7 (L) 11.7 - 15.7 g/dL    Hematocrit 32.0 (L) 35.0 - 47.0 %    MCV 83 78 - 100 fL    MCH 27.6 26.5 - 33.0 pg    MCHC 33.4 31.5 - 36.5 g/dL    RDW 13.1 10.0 - 15.0 %    Platelet Count 243 150 - 450 10e3/uL    % Neutrophils 68 %    % Lymphocytes 18 %    % Monocytes 8 %    % Eosinophils 4 %    % Basophils 0 %    % Immature Granulocytes 2 %    NRBCs per 100 WBC 0 <1 /100    Absolute Neutrophils 7.7 1.6 - 8.3 10e3/uL    Absolute Lymphocytes 2.0 0.8 - 5.3 10e3/uL    Absolute Monocytes 0.9 0.0 - 1.3 10e3/uL    Absolute Eosinophils 0.4 0.0 - 0.7 10e3/uL    Absolute Basophils 0.1 0.0 - 0.2 10e3/uL    Absolute Immature Granulocytes 0.2 <=0.4 10e3/uL    Absolute NRBCs 0.0 10e3/uL       Labs, vital signs, and imaging studies were reviewed by me.    Medications   acetaminophen (TYLENOL) tablet 650 mg (has no administration in time range)   oxyCODONE (ROXICODONE) tablet 5 mg (5 mg Oral Given 9/16/22 4199)   acetaminophen (TYLENOL) tablet 650 mg (650 mg Oral Given 9/16/22 1945)       Assessments & Plan (with Medical Decision Making)   Ailin Calhoun is a 27 year old female who presents to the emergency department with known lumbar herniated disc with radicular symptoms.  Patient was sent to the  emergency department as no beds available for inpatient admission at this time.    Patient was discussed with internal medicine, they are in the emergency department to see the patient.  They plan to order pain medications for the patient at this time.    2215 Pt d/w NSG, they will come to the ER and see the patient    Patient was discussed with neurosurgery, they have seen the patient in the emergency department and plan for likely surgery within the next 24 to 48 hours.  They request OB/GYN consultation this evening so that they can begin to plan for this procedure.    Patient was discussed with OB/GYN, they will see the patient in the emergency department.    Patient was discussed with neurosurgery, plan for surgery in the morning.  Patient to be kept n.p.o. at midnight.  This order has been placed.  Preoperative labs have also been ordered.  OB/GYN was updated on this development.  They have also seen the patient in the emergency department and will discuss plans directly with neurosurgery team.    I have reviewed the nursing notes.    I have reviewed the findings, diagnosis, plan and need for follow up with the patient.    Patient to be admitted to internal medicine service for further management. Plan was discussed with patient who understands and agrees with plan.    New Prescriptions    No medications on file       Final diagnoses:   Lumbar herniated disc     Cristina Simon MD  9/16/2022   [facility] EMERGENCY DEPARTMENT     Cristina Simon MD  09/17/22 0107

## 2022-09-17 NOTE — OP NOTE
[facility]  DEPARTMENT OF NEUROSURGERY  OPERATIVE REPORT    PROCEDURE DATE: 09/17/2022    PREOPERATIVE DIAGNOSES:    1.  Large right central disk herniation at L4-L5.  2.  Severe right dorsiflexion weakness.  3.  Severe right leg pain.  4.  29 weeks gestation.    POSTOPERATIVE DIAGNOSES:    1.  Large right central disk herniation at L4-L5.  2.  Severe right dorsiflexion weakness.  3.  Severe right leg pain.  4.  29 weeks gestation.    PROCEDURES PERFORMED:    1.  Right-sided L4-L5 microdiscectomy and hemilaminectomy, decompression of L4-5 level, decompression of the right L5 nerve root.  2.  Use of intraoperative microscope.    ASSISTANT:  Barrie Owen MD    ASSISTANT:  Ana María Mensah MD    ANESTHESIA:  General.    ESTIMATED BLOOD LOSS:  5 mL    INDICATIONS FOR PROCEDURE:  Ms. Calhoun is a 27-year-old right-handed female who is 29 weeks' gestation and developed severe right leg pain and inability to dorsiflex her right leg for 2 days.  She was seen at [facility], where an MRI was performed, demonstrating a large central disk herniation at L4-L5, biased towards the right side.  She was referred to our facility for urgent management.  We met, discussed the options, given her pregnancy and high risk nature of the lesion together with the OB Team and Anesthesia Team.  We described the risks of the surgery as well as the potential risk for her pregnancy, and she agreed to proceed t attempt to prevent further neurologic dysfunction.  Informed consent was obtained.    PROCEDURE IN DETAIL:  After informed consent was obtained, the patient was brought to the operating room and placed supine on the operating room table.  The Anesthesia service, with the OB and Neurosurgery teams present, performed an intubation and established intravenous and intra-arterial access.  The OB Team placed a monitor to monitor the fetus during the procedure.  We then carefully flipped the patient prone on a Hussein table, so  that the abdomen could hang free and not be compressed.  Fetal monitoring demonstrated normal fetus function.  We carefully pressure padded all pressure points.  A midline incision around the L4-L5 area was marked and a localizing x-ray was obtained to confirm our location.  The area was sterilely prepped and draped in the usual sterile fashion.  A timeout was performed to confirm details of the procedure.    We then began the surgery.  We made a small incision at the L4-L5 region.  We dissected down to identify the L5 spinous process.  We placed an Allis clamp on the L5 spinous process and a Penfield 4 under the L5 lamina on the right side and confirmed this with x-ray.  The x-ray appeared consistent with our anatomic localization.  We therefore exposed the right side of the L4 lamina and identified the facet capsules at the level above and below.  We then brought in the operating microscope and performed a hemilaminectomy on the right side at L4 along its inferior lamina.  We dissected the ligamentum flavum until epidural fat was seen.  We resected this and identified the thecal sac.  We decompressed the thecal sac and it was obviously pushed posteriorly by a ventral pathology.  We then identified the descending L5 nerve root on the right side.  We then used gentle retraction of the thecal sac and L5 nerve root to identify a large disk in this area.  We performed an annulotomy and soft disk extruded.  Several large fragments were released.  We then used a down curette to release additional soft disk material medially and caudally.  The area appeared nicely decompressed after we were finished.  We reinspected the area and found no evidence of residual disk material that could be removed.  We obtained hemostasis with bipolar cautery and Gelfoam slurry.  We then followed the L5 nerve root and it appeared nicely decompressed as well throughout its course.  We therefore turned to closure.  We irrigated copiously.  We  then closed the wound in layers using 0 interrupted Vicryl sutures.  The skin was then closed with a running 3-0 Monocryl stitch.  The wound was sterilely dressed with Exofin skin glue.  The sterile drapes were removed.  The patient was then flipped back supine on the hospital bed and returned to the Anesthesia Service for extubation.  The patient was then transported to the postoperative care unit for further monitoring.  During the procedure, the OB Team monitored heart tones.    Upon conclusion of the procedure, I met with patient's spouse for an update.  I was scrubbed through the critical portions of the case and was immediately available throughout.    Barrie Owen MD        D: 2022   T: 2022   MT: [id number]    Name:     CATRACHITA ROBLES  MRN:      [id number]        Account:        [id number]   :      1995           Procedure Date: 2022     Document: [id number]

## 2022-09-18 ENCOUNTER — TELEPHONE (OUTPATIENT)
Dept: NEUROLOGY | Facility: CLINIC | Age: 27
End: 2022-09-18

## 2022-09-18 VITALS
HEIGHT: 72 IN | OXYGEN SATURATION: 96 % | SYSTOLIC BLOOD PRESSURE: 115 MMHG | RESPIRATION RATE: 17 BRPM | DIASTOLIC BLOOD PRESSURE: 52 MMHG | BODY MASS INDEX: 34.49 KG/M2 | WEIGHT: 254.63 LBS | TEMPERATURE: 98.8 F | HEART RATE: 105 BPM

## 2022-09-18 PROCEDURE — 250N000013 HC RX MED GY IP 250 OP 250 PS 637: Performed by: INTERNAL MEDICINE

## 2022-09-18 PROCEDURE — 250N000011 HC RX IP 250 OP 636: Performed by: STUDENT IN AN ORGANIZED HEALTH CARE EDUCATION/TRAINING PROGRAM

## 2022-09-18 PROCEDURE — 250N000013 HC RX MED GY IP 250 OP 250 PS 637: Performed by: STUDENT IN AN ORGANIZED HEALTH CARE EDUCATION/TRAINING PROGRAM

## 2022-09-18 PROCEDURE — 99231 SBSQ HOSP IP/OBS SF/LOW 25: CPT | Performed by: OBSTETRICS & GYNECOLOGY

## 2022-09-18 PROCEDURE — 999N000147 HC STATISTIC PT IP EVAL DEFER

## 2022-09-18 PROCEDURE — 99239 HOSP IP/OBS DSCHRG MGMT >30: CPT | Performed by: INTERNAL MEDICINE

## 2022-09-18 RX ORDER — CALCIUM CARBONATE 500 MG/1
500 TABLET, CHEWABLE ORAL 3 TIMES DAILY PRN
Status: DISCONTINUED | OUTPATIENT
Start: 2022-09-18 | End: 2022-09-18 | Stop reason: HOSPADM

## 2022-09-18 RX ORDER — OXYCODONE HYDROCHLORIDE 5 MG/1
5 TABLET ORAL EVERY 6 HOURS PRN
Qty: 10 TABLET | Refills: 0 | Status: ON HOLD | OUTPATIENT
Start: 2022-09-18 | End: 2022-12-02

## 2022-09-18 RX ORDER — ACETAMINOPHEN 325 MG/1
650 TABLET ORAL EVERY 6 HOURS PRN
COMMUNITY
Start: 2022-09-18 | End: 2023-10-12

## 2022-09-18 RX ORDER — FAMOTIDINE 20 MG/1
20 TABLET, FILM COATED ORAL DAILY PRN
Status: DISCONTINUED | OUTPATIENT
Start: 2022-09-18 | End: 2022-09-18 | Stop reason: HOSPADM

## 2022-09-18 RX ADMIN — BETAMETHASONE SODIUM PHOSPHATE AND BETAMETHASONE ACETATE 12 MG: 3; 3 INJECTION, SUSPENSION INTRA-ARTICULAR; INTRALESIONAL; INTRAMUSCULAR; SOFT TISSUE at 03:54

## 2022-09-18 RX ADMIN — ACETAMINOPHEN 650 MG: 325 TABLET ORAL at 05:44

## 2022-09-18 RX ADMIN — ACETAMINOPHEN 650 MG: 325 TABLET ORAL at 09:00

## 2022-09-18 RX ADMIN — CALCIUM CARBONATE (ANTACID) CHEW TAB 500 MG 500 MG: 500 CHEW TAB at 10:43

## 2022-09-18 RX ADMIN — OXYCODONE HYDROCHLORIDE 5 MG: 5 TABLET ORAL at 03:58

## 2022-09-18 ASSESSMENT — ACTIVITIES OF DAILY LIVING (ADL)
ADLS_ACUITY_SCORE: 26
ADLS_ACUITY_SCORE: 26
ADLS_ACUITY_SCORE: 28
ADLS_ACUITY_SCORE: 26
ADLS_ACUITY_SCORE: 26
ADLS_ACUITY_SCORE: 28
ADLS_ACUITY_SCORE: 26
ADLS_ACUITY_SCORE: 26

## 2022-09-18 NOTE — PLAN OF CARE
Goal Outcome Evaluation:    Alert and oriented x4. Room air. Dressing CDI. VSS. .        Patient discharged to home with . Discharge papers gone over with patient who verbalized understanding.

## 2022-09-18 NOTE — PROGRESS NOTES
[facility], [place]  Neurosurgery Progress Note:  09/18/2022      Interval History: GEORGE. Admitted to medicine.     Assessment:  Ailin Calhoun is a 27 year old female with past medical history significant for asthma on albuterol who presented to ED for evaluation of right lower leg weakness and shooting pain with MRI lumbar spine demonstrating L4-5 disc herniation eccentric to right, with caudal migration and canal stenosis, with right foot drop. She is now s/p right L4-5 microdisectomy and hemilaminectomy     Plan:  - Serial neuro exams  - No spinal precautions necessary  - PRN pain medications  - No steroids  - Activity: up ad lucille  - OK to discharge   - Follow up with Neurosurgery in 2 weeks, message sent to schedulers  - DVT prophylaxis: SCDs  -----------------------------------  Jaswant Antonio MD  Neurosurgery Resident  Pager: [phone]  -----------------------------------    GENERAL: lying in bed, NAD, fetal monitor in place  PULM: breathing comfortably on room air  ABD: soft, non-distended  MSK: right lower back tenderness to palpation  NEUROLOGIC:  -- Awake; Alert; oriented x 3  -- Follows commands briskly  -- +repetition, calculation, and naming  -- Speech fluent, spontaneous. No aphasia or dysarthria.  -- no gaze preference. No apparent hemineglect.  Cranial Nerves:  -- visual fields full to confrontation, PERRL 3-2mm bilat and brisk, extraocular movements intact  -- face symmetrical, tongue midline  -- sensory V1-V3 intact bilaterally  -- palate elevates symmetrically, uvula midline  -- hearing grossly intact bilat  -- Trapezii 5/5 strength bilat symmetric  -- Cerebellar: Finger nose finger without dysmetria, intact rapid alternating motions bilaterally     Motor:  Normal bulk / tone; no tremor, rigidity, or bradykinesia.  No muscle wasting or fasciculations  No Pronator Drift       Delt Bi Tri Hand Flexion/  Extension Iliopsoas Quadriceps Hamstrings Tibialis Anterior Gastroc      C5 C6 C7 C8/T1 L2 L3 L4-S1 L4 S1   R 5 5 5 5 5 5 5 5 5   L 5 5 5 5 5 5 5 5 5         Right dorsiflexion 4/5, Right EHL 4/5     Sensory:  intact to LT x 4 extremities, some tingling and numbness in the right toes     Reflexes:       Bi Tri BR Daniela Pat Ach Bab     C5-6 C7-8 C6 UMN L2-4 S1 UMN   R 2+ 2+ 2+ Norm 2+ 2+ Norm   L 2+ 2+ 2+ Norm 2+ 2+ Norm      Gait: Normal      Objective:   Temp:  [97.9  F (36.6  C)-99.4  F (37.4  C)] 98.8  F (37.1  C)  Pulse:  [] 105  Resp:  [17-18] 17  BP: ()/(52-65) 115/52  SpO2:  [92 %-97 %] 96 %  I/O last 3 completed shifts:  In: 2500 [P.O.:350; I.V.:2150]  Out: 1510 [Urine:1500; Blood:10]        LABS:  Recent Labs   Lab 09/17/22  1626 09/17/22  1121 09/17/22  0816 09/17/22  0028    138  --  140   POTASSIUM 4.4 3.7  --  3.4   CHLORIDE 108  --   --  109   CO2 25  --   --  24   ANIONGAP 6  --   --  7   * 136* 129* 90   BUN 3*  --   --  7   CR 0.51*  --   --  0.52   SRINIVAS 8.1*  --   --  9.3       Recent Labs   Lab 09/17/22  1626   WBC 14.1*   RBC 3.76*   HGB 10.4*   HCT 31.0*   MCV 82   MCH 27.7   MCHC 33.5   RDW 13.0          IMAGING:  No results found for this or any previous visit (from the past 24 hour(s)).

## 2022-09-18 NOTE — TELEPHONE ENCOUNTER
Late entry  Multiple phone calls made 9/16 to admit Ailin to the [place] for neurosurgery consultation for large lumbar disc herniation and right foot weakness with symptoms starting 9/13/2022. Patient complicated being 29 weeks pregnant. Dr Foster spoke with neurosurgery at the . I spoke with OB, neurosurgery, medicine, ED providers at the [place]. I updated Ailin and gave her the address to the Emergency room [facility] per [place] providers.     GUERO Arce-CNP  [facility]  O: [phone]

## 2022-09-18 NOTE — PROGRESS NOTES
MFM Progress Note    S: No concerns today. Notes only numbness in her right toes. She is feeling regular fetal movement. No contractions, bleeding or leakage of fluid. Ailin is hoping for discharge home today.    O:  /52 (BP Location: Left arm)   Pulse 105   Temp 98.8  F (37.1  C) (Oral)   Resp 17   Ht 1.829 m (6')   Wt 115.5 kg (254 lb 10.1 oz)   SpO2 96%   BMI 34.53 kg/m    Gen: NAD  Abd: Gravid  Ext: Negative    FHT: 135, moderate variability, accelerations present, no decelerations  [place]: Quiet    A/P: 27 year old  at 29wd by LMP c/w 9w0d US who is POD#1 from hemilaminectomy with microdiscectomy of L4-L5 for disc herniation with spinal stenosis. Doing well. No obstetric concerns at this time.  -Continue TID fetal/uterine monitoring while inpatient, then resume routine prenatal care with Dr. Tillman once discharged home.  -Recommend anesthesiology consultation at the hospital she is planning to deliver (either [facility] or [facility]) to ensure they are aware of recent surgery and discuss anesthesia options while in labor.  -Per Neurosurgery, okay for vaginal delivery    Monica Ferrell MD  Specialist in Maternal-Fetal Medicine

## 2022-09-18 NOTE — PHARMACY-ADMISSION MEDICATION HISTORY
Admission medication history interview status for the 9/16/2022 admission is complete. See EPIC admission navigator for allergy information, pharmacy, prior to admission medications and immunization status.     Medication history interview sources:  patient    Changes made to PTA medication list (reason)  Added: prenatal MVT, albuterol, doxylamine, sertraline, vitamin B6  Deleted: none  Changed: none    Additional medication history information (including reliability of information, actions taken by pharmacist):Patient had medications with her and was very knowledgeable of her medications    Medication history completed by: Gabriela Ken, PharmD, MPH, MS      Prior to Admission medications    Medication Sig Last Dose Taking? Auth Provider Long Term End Date   albuterol (PROAIR HFA/PROVENTIL HFA/VENTOLIN HFA) 108 (90 Base) MCG/ACT inhaler Inhale 1-2 puffs into the lungs every 6 hours as needed for shortness of breath / dyspnea or wheezing Past Week at Unknown time Yes Unknown, Entered By History     doxylamine (UNISOM) 25 MG TABS tablet Take 25 mg by mouth nightly as needed for sleep 9/17/2022 at Unknown time Yes Unknown, Entered By History     Prenatal Vit-Fe Fumarate-FA (PRENATAL MULTIVITAMIN  PLUS IRON) 27-1 MG TABS Take 1 tablet by mouth daily 9/17/2022 at Unknown time Yes Unknown, Entered By History     sertraline (ZOLOFT) 25 MG tablet Take 25 mg by mouth daily 9/17/2022 at Unknown time Yes Unknown, Entered By History Yes    vitamin B6 (PYRIDOXINE) 100 MG tablet Take 100 mg by mouth daily 9/17/2022 at Unknown time Yes Unknown, Entered By History

## 2022-09-18 NOTE — DISCHARGE SUMMARY
[facility]  Hospitalist Discharge Summary      Date of Admission:  9/16/2022  Date of Discharge:  9/18/2022  Discharging Provider: Kameron Duffy MD  Discharge Service: Hospitalist Service, GOLD TEAM 18    Discharge Diagnoses      Lumbar disc herniation   S/p HEMILAMINECTOMY, SPINE, LUMBAR, 1 LEVEL, WITH DISCECTOMY  29 weeks pregnancy.       Follow-ups Needed After Discharge   Follow-up Appointments     Adult [facility] Follow-up and recommended labs and tests      Follow up with Ob/Gyn as scheduled and PRN for your pregnancy.   Follow up with Neurosurgery in 2 weeks / as needed.   Follow up with primary care as needed.     Appointments on [place] and/or [facility] (with [facility] or [facility]   provider or service). Call [phone] if you haven't heard regarding   these appointments within 7 days of discharge.             Discharge Disposition   Discharged to home  Condition at discharge: Stable      Hospital Course     Patient is a 26 y/o woman who has asthma and who is over 29 weeks pregnant. Patient presented with right leg pain, numbness and weakness that appears to be secondary to lumbar disc herniation with stenosis and right-sided radiculopathy.  Neurosurgery consulted. S.p following procedure, uncomplicated. EBL: 5 ml. By Dr Owen.  Recovering well.   PT consulted, no need.   Neuro Surgery cleared for home discharge. Follow-up in 2 weeks.   MFM consulted. Fetal monitoring done inpatient. No concern.   Resume routine prenatal care with Dr. Tillman once discharged home.  -Per Neurosurgery, okay for vaginal delivery  - Pain control: Acetaminophen, oxycodone prn (minimize, avoid as able given risk of sedation to the fetus.   No other concern.        Consultations This Hospital Stay   NEUROSURGERY ADULT IP CONSULT  MATERNAL FETAL MEDICINE/PERINATOLOGY IP CONSULT  PHYSICAL THERAPY ADULT IP CONSULT  OCCUPATIONAL THERAPY ADULT IP CONSULT    Code Status   Full  Code    Time Spent on this Encounter   I, Kameron Duffy MD, personally saw the patient today and spent greater than 30 minutes discharging this patient.       Kameron Duffy MD  [facility] MED SURG  [facility] [address]  Phone: [phone]  Fax: [phone]  ______________________________________________________________________    Physical Exam   Vital Signs: Temp: 98.8  F (37.1  C) Temp src: Oral BP: 115/52 Pulse: 105   Resp: 17 SpO2: 96 % O2 Device: None (Room air)    Weight: 254 lbs 10.1 oz  General Appearance: Awake, interactive, NAD  HEENT: AT/NC, Anicteric, Moist MM  Neck: Supple.   Respiratory: Normal work of breathing. RA   Cardiovascular: S1 S2 Regular.  GI/Abd: Soft. NT. Appropriately distended for pregnancy  Extremities: Distally wwp. No pedal edema.  Neuro: AO x 4, improved r le weakness, pain.  Skin: No acute rash on exposed areas.   Psychiatry: Stable mood.       Primary Care Physician   No primary care provider on file.    Discharge Orders      Reason for your hospital stay    Lumbar disc herniation s.p LAMINECTOMY, SPINE, LUMBAR, 1 LEVEL  Doing well.     Activity    Your activity upon discharge: activity as tolerated     Adult [facility] Follow-up and recommended labs and tests    Follow up with Ob/Gyn as scheduled and PRN for your pregnancy.   Follow up with Neurosurgery in 2 weeks / as needed.   Follow up with primary care as needed.     Appointments on [place] and/or [facility] (with [facility] or [facility] provider or service). Call [phone] if you haven't heard regarding these appointments within 7 days of discharge.     Diet    Follow this diet upon discharge: Orders Placed This Encounter      Regular Diet Adult       Significant Results and Procedures   Most Recent 3 CBC's:Recent Labs   Lab Test 09/17/22  1626 09/17/22  1121 09/17/22  0028   WBC 14.1*  --  11.3*   HGB 10.4* 10.5* 10.7*   MCV 82  --  83     --  243     Most Recent 3  BMP's:Recent Labs   Lab Test 09/17/22  1626 09/17/22  1121 09/17/22  0816 09/17/22  0028    138  --  140   POTASSIUM 4.4 3.7  --  3.4   CHLORIDE 108  --   --  109   CO2 25  --   --  24   BUN 3*  --   --  7   CR 0.51*  --   --  0.52   ANIONGAP 6  --   --  7   SRINIVAS 8.1*  --   --  9.3   * 136* 129* 90     Most Recent 2 LFT's:No lab results found.  Most Recent 3 INR's:Recent Labs   Lab Test 09/17/22  1626 09/17/22  0028   INR 0.98 0.99     Most Recent Urinalysis:No lab results found.  Most Recent ESR & CRP:No lab results found.,   Results for orders placed or performed during the hospital encounter of 09/16/22   XR Lumbar Spine Port 1 View    Narrative    EXAM: XR CROSSTABLE LATERAL LUMBAR SPINE PORTABLE  LOCATION: [facility]  DATE/TIME: 9/17/2022 10:17 AM    INDICATION: Intraoperative  COMPARISON: None.  TECHNIQUE: CR Lumbar Spine.      Impression    IMPRESSION: There is a surgical needle over the posterior soft tissues at the level of the L5-S1 disc space.   XR Lumbar Spine Port 1 View    Narrative    EXAM: XR CROSSTABLE LATERAL LUMBAR SPINE PORTABLE  LOCATION: [facility]  DATE/TIME: 9/17/2022 10:18 AM    INDICATION: Intraoperative  COMPARISON: None.  TECHNIQUE: CR Lumbar Spine.      Impression    IMPRESSION: There is a surgical probe over the posterior soft tissues with the distal tip the level of the L5-S1 disc space. There is a [place] device over the posterior soft tissues at the level of the L5 pedicles.       Discharge Medications   Current Discharge Medication List      START taking these medications    Details   acetaminophen (TYLENOL) 325 MG tablet Take 2 tablets (650 mg) by mouth every 6 hours as needed for mild pain      oxyCODONE (ROXICODONE) 5 MG tablet Take 1 tablet (5 mg) by mouth every 6 hours as needed for moderate to severe pain  Qty: 10 tablet, Refills: 0    Associated Diagnoses: Lumbar herniated  disc         CONTINUE these medications which have NOT CHANGED    Details   albuterol (PROAIR HFA/PROVENTIL HFA/VENTOLIN HFA) 108 (90 Base) MCG/ACT inhaler Inhale 1-2 puffs into the lungs every 6 hours as needed for shortness of breath / dyspnea or wheezing      doxylamine (UNISOM) 25 MG TABS tablet Take 25 mg by mouth nightly as needed for sleep      Prenatal Vit-Fe Fumarate-FA (PRENATAL MULTIVITAMIN  PLUS IRON) 27-1 MG TABS Take 1 tablet by mouth daily      sertraline (ZOLOFT) 25 MG tablet Take 25 mg by mouth daily      vitamin B6 (PYRIDOXINE) 100 MG tablet Take 100 mg by mouth daily           Allergies   Allergies   Allergen Reactions     Aspirin      Ibuprofen

## 2022-09-18 NOTE — PLAN OF CARE
VSS.On room air.  Lower back incision with primapore drsg,CDI.  Sore on incision,managing with tylenol,oxycodone and ice pack.  R toes with tingling sensation.Pt states noticed after surgery and neurosurgeon is aware.  Up independently.Steady gait.  Voiding spontaneously.  PIV lines saline locked.  Feels baby moving per usual.On betamethasone.  OB nurse to recheck FHT at 0630.  Sleeping in between checks.  Calls appropriately.

## 2022-09-18 NOTE — PLAN OF CARE
PT/OT: new PT orders received post microdiscectomy and hemilaminectomy. Per neurosurgery notes, there are no spinal precautions necessary. Pt is young and up on floor completely IND. Per chart review  hopeful to discharge home today. Does not require acute PT intervention to assist with discharge planning as pt up IND with no post op precautions. Will complete orders. If still having radicular symptoms post op, recommend follow up with OP PT.

## 2022-09-27 LAB
BASE EXCESS BLDA CALC-SCNC: -5 MMOL/L (ref -9.6–2)
CA-I BLD-MCNC: 4.6 MG/DL (ref 4.4–5.2)
COHGB MFR BLD: 99 % (ref 92–100)
GLUCOSE BLD-MCNC: 124 MG/DL (ref 70–99)
HCO3 BLDA-SCNC: 20 MMOL/L (ref 21–28)
HGB BLD-MCNC: 10.7 G/DL (ref 11.7–15.7)
LACTATE BLD-SCNC: 1.3 MMOL/L
O2/TOTAL GAS SETTING VFR VENT: 45 %
PCO2 BLDA: 33 MM HG (ref 35–45)
PH BLDA: 7.38 [PH] (ref 7.35–7.45)
PO2 BLDA: 142 MM HG (ref 80–105)
POTASSIUM BLD-SCNC: 3.4 MMOL/L (ref 3.5–5)
SODIUM BLD-SCNC: 139 MMOL/L (ref 133–144)

## 2022-09-27 ASSESSMENT — ASTHMA QUESTIONNAIRES
QUESTION_5 LAST FOUR WEEKS HOW WOULD YOU RATE YOUR ASTHMA CONTROL: SOMEWHAT CONTROLLED
ACT_TOTALSCORE: 13
QUESTION_1 LAST FOUR WEEKS HOW MUCH OF THE TIME DID YOUR ASTHMA KEEP YOU FROM GETTING AS MUCH DONE AT WORK, SCHOOL OR AT HOME: MOST OF THE TIME
ACT_TOTALSCORE: 13
QUESTION_4 LAST FOUR WEEKS HOW OFTEN HAVE YOU USED YOUR RESCUE INHALER OR NEBULIZER MEDICATION (SUCH AS ALBUTEROL): ONE OR TWO TIMES PER DAY
QUESTION_2 LAST FOUR WEEKS HOW OFTEN HAVE YOU HAD SHORTNESS OF BREATH: ONCE A DAY
QUESTION_3 LAST FOUR WEEKS HOW OFTEN DID YOUR ASTHMA SYMPTOMS (WHEEZING, COUGHING, SHORTNESS OF BREATH, CHEST TIGHTNESS OR PAIN) WAKE YOU UP AT NIGHT OR EARLIER THAN USUAL IN THE MORNING: ONCE OR TWICE

## 2022-09-27 NOTE — PROGRESS NOTES
Clinic Note - Return OB Visit    Ailin Calhoun is a 27 year old  female who presents to clinic for a follow up OB visit. She is currently 31w0d with an estimated date of delivery of 2022 via LMP c/w 1st tri . She denies headache, chest pain, shortness of breath, abdominal pain/contractions, vaginal bleeding, or abnormal discharge. She has felt baby move.     New concerns today:   -recent back surgery for lumbar disc herniation causing severe stenosis and R-sided radiculopathy - overall went well - but feeling exhausted, wondering about letter for work to decrease hours/time (currently sometimes 14 hr days, goal is to stop doing weekend long days - thinking of decreasing to 20 hours) - not using gabapentin, rarely using oxycodone - follow-up apt on Friday    OB History    Para Term  AB Living   1 0 0 0 0 0   SAB IAB Ectopic Multiple Live Births   0 0 0 0 0      # Outcome Date GA Lbr Jayesh/2nd Weight Sex Delivery Anes PTL Lv   1 Current                Physical exam:  /70   Pulse 108   Wt 114.6 kg (252 lb 9 oz)   LMP 2022   SpO2 100%   BMI 34.25 kg/m      General: alert, female in no acute distress  Abdomen: gravid uterus appropriate for gestation age at 30 cm, non-tender, FHTs 150  Extremities: no edema    Encounter for supervision of normal first pregnancy in second trimester  Obesity affecting pregnancy, antepartum  G1 at 31+0 weeks today. Pregnancy complicated by obesity (pregravid BMI 32.08), depression (on selective serotonin reuptake inhibitor), asthma (mild, well controlled).        Reviewed pre-verena labs: O pos, invitae nml, baby boy    Follow up in 2 weeks for routine prenatal visit     Lainey Tillman MD  [facility]

## 2022-09-27 NOTE — PATIENT INSTRUCTIONS
Phone Numbers:  [facility]: [phone]  [facility]: [phone]  Call [phone] to schedule ultrasound (schedule in 1-5 weeks - 32-36 weeks pregnant - follow-up growth US)     When to call or come in to the hospital   If you notice a decrease in your baby's movement.   If your begin to experience contractions that are 5 minutes or less apart and lasting for over 45 seconds, or if contractions are becoming more painful.  If you have any bleeding or leakage of fluids.   If you have a headache not resolved with tylenol, right upper abdominal pain, or sudden onset of swelling.  You know your body best. Never hesitate to call or go to the hospital if something doesn't feel right!    After-baby Birth Control Methods   It is important to consider contraception after your baby is born if you would like to prevent a pregnancy in the near future. If you are breast feeding, talk with your doctor to determine the best method for you. It is recommended that you wait 12 months after the birth of your baby to get pregnant again.   Natural Family Planning  It is possible to avoid pregnancy or time them based on your family goals without any hormones or medications or need for condoms. In order to be successful with this method, both partners need to be diligent in tracking fertility/ovulation and abiding by abstinence during certain times of the month to avoid pregnancy.  Condoms   Latex condoms can prevent pregnancy and STDs. Condoms work best when used with spermicide that is placed inside the vagina as well as inside the condom. Use only water-based lubricants. Petroleum based products (such as Vaseline and many massage oils) can weaken the latex and cause it to break.   IUD   IUD's are made of flexible plastic and must be inserted into your uterus by a doctor. The IUD works by stopping the fertilized egg from attaching itself to the uterus. IUDs may increase the risk of getting a pelvic infection (PID), which can  lead to infertility if not diagnosed and treated early. This is a great option after delivery of your baby! These last for 3, 5, or 10 years depending up on which type you choose, but can be removed earlier if you decide you would like to get pregnant sooner.  Tubal Ligation & Vasectomy   These are good choices for women and men who know that they do not want to have any more children.     HORMONES   Birth control pills, hormone implants and shots work by stopping ovulation (release of the egg from the ovary). Implants are placed in the upper arm by a minor surgical incision. They last for three years, but can be removed by your doctor if you decide to get pregnant. Hormone injections must be repeated every three months. The Pill must be taken every day.   All hormones can have side effects and create certain health risks. They are very effective in preventing pregnancy but they do not prevent STDs. You can talk more about the risks and benefits with your doctor.     Controlling Back Pain  As your body changes during pregnancy, your back must work in new ways. Back pain is due to many causes. Physical changes in your body can strain your back and its supporting muscles. Also, hormones (chemicals that carry messages throughout the body) increase during pregnancy. This can affect how the muscles and joints work together. All of these changes can lead to pain in the upper or lower back or pelvis. Some pregnant women have sciatica, pain caused by pressure on the sciatic nerve running down the back of the leg. Ask your healthcare provider for specific tips and exercises to help control your back pain.    Tips to Help You Rest  Good rest and sleep will help you feel better. Here are some ideas:  Ask your partner to massage your shoulders, neck, or back.  Limit the errands you do each day.  Lie down in the afternoon or after work for a few minutes.  Take a warm bath before you go to sleep.  Drink warm milk or teas without  caffeine.  Avoid coffee, black tea, and cola.    Preventing Heartburn  Avoid spicy or acidic foods.   Eat small amounts more often.  Wait 2 hours after eating before lying down   Sleep with your upper body raised 6 inches.  May use Tums as needed. Talk to your doctor about other medications to try.     [place] parenting classes [email or web address]://[email or web address]/  [place] parenting classes [email or web address]-information  Free online classes through [employer]

## 2022-09-28 ENCOUNTER — PRENATAL OFFICE VISIT (OUTPATIENT)
Dept: FAMILY MEDICINE | Facility: CLINIC | Age: 27
End: 2022-09-28
Payer: COMMERCIAL

## 2022-09-28 VITALS
HEART RATE: 108 BPM | OXYGEN SATURATION: 100 % | SYSTOLIC BLOOD PRESSURE: 120 MMHG | WEIGHT: 252.56 LBS | BODY MASS INDEX: 34.25 KG/M2 | DIASTOLIC BLOOD PRESSURE: 70 MMHG

## 2022-09-28 DIAGNOSIS — O99.210 OBESITY AFFECTING PREGNANCY, ANTEPARTUM: ICD-10-CM

## 2022-09-28 DIAGNOSIS — Z34.02 ENCOUNTER FOR SUPERVISION OF NORMAL FIRST PREGNANCY IN SECOND TRIMESTER: Primary | ICD-10-CM

## 2022-09-28 PROCEDURE — 99207 PR PRENATAL VISIT: CPT | Performed by: FAMILY MEDICINE

## 2022-09-28 PROCEDURE — 90715 TDAP VACCINE 7 YRS/> IM: CPT | Performed by: FAMILY MEDICINE

## 2022-09-28 PROCEDURE — 90471 IMMUNIZATION ADMIN: CPT | Performed by: FAMILY MEDICINE

## 2022-09-28 NOTE — LETTER
September 28, 2022      Ailin Calhoun  [address]  [place] [address]        To Whom It May Concern:    Ailin Calhoun is under my medical care. For the health and safety of her and baby, I recommend decreasing her work hours to 20 hours/week until baby comes (estimated due date 11/30/22). If any formal forms are needed, please send to me for completion.        Sincerely,        Lainey Tillman MD

## 2022-09-30 ENCOUNTER — OFFICE VISIT (OUTPATIENT)
Dept: NEUROSURGERY | Facility: CLINIC | Age: 27
End: 2022-09-30
Payer: COMMERCIAL

## 2022-09-30 VITALS
RESPIRATION RATE: 16 BRPM | WEIGHT: 254 LBS | SYSTOLIC BLOOD PRESSURE: 121 MMHG | HEART RATE: 95 BPM | DIASTOLIC BLOOD PRESSURE: 74 MMHG | BODY MASS INDEX: 34.45 KG/M2 | OXYGEN SATURATION: 95 %

## 2022-09-30 DIAGNOSIS — M54.16 LUMBAR RADICULOPATHY, ACUTE: Primary | ICD-10-CM

## 2022-09-30 PROCEDURE — 99024 POSTOP FOLLOW-UP VISIT: CPT | Performed by: PHYSICIAN ASSISTANT

## 2022-09-30 ASSESSMENT — PAIN SCALES - GENERAL: PAINLEVEL: MILD PAIN (3)

## 2022-09-30 NOTE — PROGRESS NOTES
[facility]  Department of Neurosurgery  Center for Skull Base and Pituitary Surgery    Name: Ailin Calohun  MRN: [id number]  Age: 27 year old  : 2022      Chief Complaint:   L4-5 large right disc herniation s/p right sided L4-5 microdiscectomy and hemilaminectomy, decompression of L4-5 level, decompression of right L5 nerve root 2022, 2 week postoperative visit    History of Present Illness:   Ailin Calhoun is a 27 year old female with a history of asthma who is seen today for a 2 week postoperative visit. She is 31 weeks pregnant, BARBARA 22. She presented to the Emergency Department 22 with right leg pain, numbness, and weakness, found to have a large right disc herniation at the L4-5 level. Neurosurgery consulted with OB/Gyn who together formulated a surgical plan given her acute right sided radiculopathy with hopes to prevent further neurologic dysfunction. She underwent the above mentioned procedure with Dr. Owen and had an uncomplicated hospital course, discharging home on 2022. She's here today with her . She feels well. Has some residual pain down the back of her leg into the top of her foot and tingling but symptoms much improved from preoperative state. She feels dorsiflexion still a little weak but again, much improved. No new fever, chills, saddle paresthesias, new weakness or paresthesias.    Review of Systems:   Pertinent items are noted in HPI or as in patient entered ROS below, remainder of complete ROS is negative.     Physical Exam:   /74   Pulse 95   Resp 16   Wt 115.2 kg (254 lb)   LMP 2022   SpO2 95%   BMI 34.45 kg/m    MSK: Moves all extremities.  No obvious deformity.  Neuro: The patient is fully oriented. Speech is normal. LE strength is 5/5 other than 3+ to 4/5 in right dorsiflexion. LE DTRs 2+. Gait is normal.  Psych: Normal mood and affect. Behavior is normal.    Incision: her lumbar incision is well  healed without erythema, swelling, or drainage.    Imaging:  No new imaging to review     Assessment:  L4-5 large right disc herniation s/p right sided L4-5 microdiscectomy and hemilaminectomy, decompression of L4-5 level, decompression of right L5 nerve root 9/17/2022, 2 week postoperative visit    Plan:  Advised walking for exercise, wound care discussed. She declined PT referral, will reach out if needed in the future. Follow up in 6 months with XRs at that time. She was encouraged to reach out sooner with new concerns or questions.     Katiln Schultz PA-C  Department of Neurosurgery

## 2022-09-30 NOTE — PATIENT INSTRUCTIONS
Follow up in 6 weeks with Xrays prior to appointment.    Please feel free to reach out sooner with new questions or concerns.

## 2022-09-30 NOTE — LETTER
2022       RE: Ailin Calhoun  [address] [facility] [address]     Dear Colleague,    Thank you for referring your patient, Ailin Calhoun, to the [facility] NEUROSURGERY CLINIC [place] [facility]. Please see a copy of my visit note below.      [facility]  Department of Neurosurgery  Center for Skull Base and Pituitary Surgery    Name: Ailin Calhoun  MRN: [id number]  Age: 27 year old  : 2022      Chief Complaint:   L4-5 large right disc herniation s/p right sided L4-5 microdiscectomy and hemilaminectomy, decompression of L4-5 level, decompression of right L5 nerve root 2022, 2 week postoperative visit    History of Present Illness:   Ailin Calhoun is a 27 year old female with a history of asthma who is seen today for a 2 week postoperative visit. She is 31 weeks pregnant, BARBARA 22. She presented to the Emergency Department 22 with right leg pain, numbness, and weakness, found to have a large right disc herniation at the L4-5 level. Neurosurgery consulted with OB/Gyn who together formulated a surgical plan given her acute right sided radiculopathy with hopes to prevent further neurologic dysfunction. She underwent the above mentioned procedure with Dr. Owen and had an uncomplicated hospital course, discharging home on 2022. She's here today with her . She feels well. Has some residual pain down the back of her leg into the top of her foot and tingling but symptoms much improved from preoperative state. She feels dorsiflexion still a little weak but again, much improved. No new fever, chills, saddle paresthesias, new weakness or paresthesias.    Review of Systems:   Pertinent items are noted in HPI or as in patient entered ROS below, remainder of complete ROS is negative.     Physical Exam:   /74   Pulse 95   Resp 16   Wt 115.2 kg (254 lb)   LMP 2022    SpO2 95%   BMI 34.45 kg/m    MSK: Moves all extremities.  No obvious deformity.  Neuro: The patient is fully oriented. Speech is normal. LE strength is 5/5 other than 3+ to 4/5 in right dorsiflexion. LE DTRs 2+. Gait is normal.  Psych: Normal mood and affect. Behavior is normal.    Incision: her lumbar incision is well healed without erythema, swelling, or drainage.    Imaging:  No new imaging to review     Assessment:  L4-5 large right disc herniation s/p right sided L4-5 microdiscectomy and hemilaminectomy, decompression of L4-5 level, decompression of right L5 nerve root 9/17/2022, 2 week postoperative visit    Plan:  Advised walking for exercise, wound care discussed. She declined PT referral, will reach out if needed in the future. Follow up in 6 months with XRs at that time. She was encouraged to reach out sooner with new concerns or questions.     Katlin Schultz PA-C  Department of Neurosurgery

## 2022-10-03 ENCOUNTER — HEALTH MAINTENANCE LETTER (OUTPATIENT)
Age: 27
End: 2022-10-03

## 2022-10-10 ENCOUNTER — HOSPITAL ENCOUNTER (OUTPATIENT)
Dept: ULTRASOUND IMAGING | Facility: CLINIC | Age: 27
Discharge: HOME | End: 2022-10-10
Attending: FAMILY MEDICINE | Admitting: FAMILY MEDICINE
Payer: COMMERCIAL

## 2022-10-10 DIAGNOSIS — Z34.02 ENCOUNTER FOR SUPERVISION OF NORMAL FIRST PREGNANCY IN SECOND TRIMESTER: ICD-10-CM

## 2022-10-10 PROCEDURE — 76816 OB US FOLLOW-UP PER FETUS: CPT

## 2022-10-11 NOTE — PROGRESS NOTES
Clinic Note - Return OB Visit    Ailin Calhoun is a 27 year old  female who presents to clinic for a follow up OB visit. She is currently 33w0d with an estimated date of delivery of 2022 via LMP c/w 1st tri US. She denies headache, chest pain, shortness of breath, abdominal pain/contractions, vaginal bleeding, or abnormal discharge. She has felt baby move.     New concerns today:   -recent back surgery for lumbar disc herniation causing severe stenosis and R-sided radiculopathy - overall recovery going good, still some issues but much better/improving  -follow-up growth US showed baby at 98%ile  -work forms  -some pelvic pressure  -maybe 1-2 matteo de santiago    OB History    Para Term  AB Living   1 0 0 0 0 0   SAB IAB Ectopic Multiple Live Births   0 0 0 0 0      # Outcome Date GA Lbr Jayesh/2nd Weight Sex Delivery Anes PTL Lv   1 Current                Physical exam:  /60   Pulse 108   Temp 98.3  F (36.8  C)   Wt 116.3 kg (256 lb 5 oz)   LMP 2022   SpO2 99%   BMI 34.76 kg/m      General: alert, female in no acute distress  Abdomen: gravid uterus appropriate for gestation age at 33 cm, non-tender, FHTs 150  Extremities: no edema    Encounter for supervision of normal first pregnancy in second trimester  Obesity affecting pregnancy, antepartum  G1 at 33+0 weeks today. Pregnancy complicated by obesity (pregravid BMI 32.08), depression (on selective serotonin reuptake inhibitor), asthma (mild, well controlled).        Reviewed pre-verena labs: O pos, invitae nml, baby boy    Follow up in 2 weeks for routine prenatal visit     Lainey Tillman MD  [facility]

## 2022-10-11 NOTE — PATIENT INSTRUCTIONS
Phone Numbers:  [facility]: [phone]  [facility]: [phone]     When to call or come in to the hospital   If you notice a decrease in your baby's movement.   If your begin to experience contractions that are 5 minutes or less apart and lasting for over 45 seconds, or if contractions are becoming more painful.  If you have any bleeding or leakage of fluids.   If you have a headache not resolved with tylenol, right upper abdominal pain, or sudden onset of swelling.  You know your body best. Never hesitate to call or go to the hospital if something doesn't feel right!    After-baby Birth Control Methods   It is important to consider contraception after your baby is born if you would like to prevent a pregnancy in the near future. If you are breast feeding, talk with your doctor to determine the best method for you. It is recommended that you wait 12 months after the birth of your baby to get pregnant again.   Natural Family Planning  It is possible to avoid pregnancy or time them based on your family goals without any hormones or medications or need for condoms. In order to be successful with this method, both partners need to be diligent in tracking fertility/ovulation and abiding by abstinence during certain times of the month to avoid pregnancy.  Condoms   Latex condoms can prevent pregnancy and STDs. Condoms work best when used with spermicide that is placed inside the vagina as well as inside the condom. Use only water-based lubricants. Petroleum based products (such as Vaseline and many massage oils) can weaken the latex and cause it to break.   IUD   IUD's are made of flexible plastic and must be inserted into your uterus by a doctor. The IUD works by stopping the fertilized egg from attaching itself to the uterus. IUDs may increase the risk of getting a pelvic infection (PID), which can lead to infertility if not diagnosed and treated early. This is a great option after delivery of your baby! These  last for 3, 5, or 10 years depending up on which type you choose, but can be removed earlier if you decide you would like to get pregnant sooner.  Tubal Ligation & Vasectomy   These are good choices for women and men who know that they do not want to have any more children.     HORMONES   Birth control pills, hormone implants and shots work by stopping ovulation (release of the egg from the ovary). Implants are placed in the upper arm by a minor surgical incision. They last for three years, but can be removed by your doctor if you decide to get pregnant. Hormone injections must be repeated every three months. The Pill must be taken every day.   All hormones can have side effects and create certain health risks. They are very effective in preventing pregnancy but they do not prevent STDs. You can talk more about the risks and benefits with your doctor.     Controlling Back Pain  As your body changes during pregnancy, your back must work in new ways. Back pain is due to many causes. Physical changes in your body can strain your back and its supporting muscles. Also, hormones (chemicals that carry messages throughout the body) increase during pregnancy. This can affect how the muscles and joints work together. All of these changes can lead to pain in the upper or lower back or pelvis. Some pregnant women have sciatica, pain caused by pressure on the sciatic nerve running down the back of the leg. Ask your healthcare provider for specific tips and exercises to help control your back pain.    Tips to Help You Rest  Good rest and sleep will help you feel better. Here are some ideas:  Ask your partner to massage your shoulders, neck, or back.  Limit the errands you do each day.  Lie down in the afternoon or after work for a few minutes.  Take a warm bath before you go to sleep.  Drink warm milk or teas without caffeine.  Avoid coffee, black tea, and cola.    Preventing Heartburn  Avoid spicy or acidic foods.   Eat small  amounts more often.  Wait 2 hours after eating before lying down   Sleep with your upper body raised 6 inches.  May use Tums as needed. Talk to your doctor about other medications to try.     [place] parenting classes [email or web address]  [place] parenting classes [email or web address]  Free online classes through [employer]

## 2022-10-12 ENCOUNTER — PRENATAL OFFICE VISIT (OUTPATIENT)
Dept: FAMILY MEDICINE | Facility: CLINIC | Age: 27
End: 2022-10-12
Payer: COMMERCIAL

## 2022-10-12 VITALS
OXYGEN SATURATION: 99 % | BODY MASS INDEX: 34.76 KG/M2 | TEMPERATURE: 98.3 F | SYSTOLIC BLOOD PRESSURE: 120 MMHG | HEART RATE: 108 BPM | WEIGHT: 256.31 LBS | DIASTOLIC BLOOD PRESSURE: 60 MMHG

## 2022-10-12 DIAGNOSIS — Z34.02 ENCOUNTER FOR SUPERVISION OF NORMAL FIRST PREGNANCY IN SECOND TRIMESTER: Primary | ICD-10-CM

## 2022-10-12 DIAGNOSIS — J45.30 MILD PERSISTENT ASTHMA WITHOUT COMPLICATION: ICD-10-CM

## 2022-10-12 DIAGNOSIS — O99.210 OBESITY AFFECTING PREGNANCY, ANTEPARTUM: ICD-10-CM

## 2022-10-12 PROCEDURE — 99207 PR PRENATAL VISIT: CPT | Performed by: FAMILY MEDICINE

## 2022-10-12 RX ORDER — ALBUTEROL SULFATE 90 UG/1
2 AEROSOL, METERED RESPIRATORY (INHALATION) EVERY 6 HOURS
Qty: 18 G | Refills: 3 | Status: ON HOLD | OUTPATIENT
Start: 2022-10-12 | End: 2022-12-05

## 2022-10-12 ASSESSMENT — PAIN SCALES - GENERAL: PAINLEVEL: MODERATE PAIN (4)

## 2022-10-12 NOTE — TELEPHONE ENCOUNTER
"Routing refill request to provider for review/approval because:  act    Last Written Prescription Date:  1/16/22  Last Fill Quantity: 18,  # refills: 3   Last office visit provider:  9/28//22     Requested Prescriptions   Pending Prescriptions Disp Refills     albuterol (PROAIR HFA/PROVENTIL HFA/VENTOLIN HFA) 108 (90 Base) MCG/ACT inhaler 18 g 3     Sig: Inhale 2 puffs into the lungs every 6 hours       Asthma Maintenance Inhalers - Anticholinergics Failed - 10/12/2022 11:48 AM        Failed - Asthma control assessment score within normal limits in last 6 months     Please review ACT score.           Failed - Recent (6 mo) or future (30 days) visit within the authorizing provider's specialty     Patient had office visit in the last 6 months or has a visit in the next 30 days with authorizing provider or within the authorizing provider's specialty.  See \"Patient Info\" tab in inbasket, or \"Choose Columns\" in Meds & Orders section of the refill encounter.            Passed - Patient is age 12 years or older        Passed - Medication is active on med list       Short-Acting Beta Agonist Inhalers Protocol  Failed - 10/12/2022 11:48 AM        Failed - Asthma control assessment score within normal limits in last 6 months     Please review ACT score.           Failed - Recent (6 mo) or future (30 days) visit within the authorizing provider's specialty     Patient had office visit in the last 6 months or has a visit in the next 30 days with authorizing provider or within the authorizing provider's specialty.  See \"Patient Info\" tab in inbasket, or \"Choose Columns\" in Meds & Orders section of the refill encounter.            Passed - Patient is age 12 or older        Passed - Medication is active on med list             Lisa Tom, RN 10/12/22 2:49 PM  "

## 2022-10-15 ENCOUNTER — TELEPHONE (OUTPATIENT)
Dept: NEUROSURGERY | Facility: CLINIC | Age: 27
End: 2022-10-15

## 2022-10-17 DIAGNOSIS — M79.18 MYOFASCIAL PAIN: ICD-10-CM

## 2022-10-17 DIAGNOSIS — G57.01 SCIATIC NERVE DISEASE, RIGHT: ICD-10-CM

## 2022-10-17 DIAGNOSIS — M21.371 RIGHT FOOT DROP: ICD-10-CM

## 2022-10-17 RX ORDER — GABAPENTIN 100 MG/1
CAPSULE ORAL
Qty: 90 CAPSULE | Refills: 0 | Status: SHIPPED | OUTPATIENT
Start: 2022-10-17 | End: 2022-11-18

## 2022-10-25 NOTE — PROGRESS NOTES
Clinic Note - Return OB Visit    Ailin Calhoun is a 27 year old  female who presents to clinic for a follow up OB visit. She is currently 35w0d with an estimated date of delivery of 2022 via LMP c/w 1st tri US. She denies headache, chest pain, shortness of breath, abdominal pain/contractions, vaginal bleeding, or abnormal discharge. She has felt baby move.     New concerns today:   -recent back surgery for lumbar disc herniation causing severe stenosis and R-sided radiculopathy - overall recovery going good, continues to improve overall  -follow-up growth US showed baby at 98%ile  -tired right now  -ACT score done today, score 14 - pt states hard to answer given some SOB with pregnancy but also some worsening given season change (states always has mild issues around this time) - states managing well    OB History    Para Term  AB Living   1 0 0 0 0 0   SAB IAB Ectopic Multiple Live Births   0 0 0 0 0      # Outcome Date GA Lbr Jayesh/2nd Weight Sex Delivery Anes PTL Lv   1 Current                Physical exam:  /52 (BP Location: Left arm, Patient Position: Sitting, Cuff Size: Adult Large)   Pulse 93   Temp (!) 96  F (35.6  C) (Temporal)   Resp 20   Wt 117.4 kg (258 lb 14.4 oz)   LMP 2022   SpO2 96%   BMI 35.11 kg/m      General: alert, female in no acute distress  Abdomen: gravid uterus appropriate for gestation age at 35 cm, non-tender, FHTs 135  Extremities: no edema    Encounter for supervision of normal first pregnancy in second trimester  Obesity affecting pregnancy, antepartum  G1 at 35+0 weeks today. Pregnancy complicated by obesity (pregravid BMI 32.08), depression (on selective serotonin reuptake inhibitor), asthma (mild, well controlled).        Reviewed pre-verena labs: O pos, invitae nml, baby boy    Follow up in 2 weeks for routine prenatal visit     Lainey Tillman MD  [facility]

## 2022-10-25 NOTE — PATIENT INSTRUCTIONS
Phone Numbers:  [facility]: [phone]  [facility]: [phone]    Can call [phone] to schedule next ultrasound - Schedule on or after 11/7 (4 weeks after last US)     When to call or come in to the hospital  If you notice a decrease in your baby's movement.   If your begin to experience contractions that are 5 minutes or less apart and lasting for over 45 seconds, or if contractions are becoming more painful.  If you have any bleeding or leakage of fluids.   If you have a headache not resolved with tylenol, right upper abdominal pain, or sudden onset of swelling.  You know your body best. Never hesitate to call or go to the hospital if something doesn't feel right!    Preparing for the hospital  You re likely feeling anxious as your child s birth approaches. This is normal. To give yourself some peace of mind, pack a bag 3-4 weeks before your due date. Here is a list of things to remember:  Personal care items like toothbrush, hair brush, lip balm, lotion, shampoo, glasses, contacts  Nightgown, bathrobe, slippers  Several pairs of underwear  Comfortable clothes for you to wear home  Camera with new batteries  Cell phone and   Insurance information and any other paperwork needed for your hospital stay  Clothes for baby to wear home  An infant, rear-facing car seat for bringing home your baby (this is required by law)    Managing Labor Pain   There are many ways to manage pain during labor. It can often be done with no anesthesia or strong pain medications. Talk to your health care provider about any options you would like to explore.     Help from Relaxation  Some of these are learned in special classes. Your health care provider can help you find classes. The hospital has a tub you can use during early labor. These methods may be of help to you:   Breathing techniques   A warm tub between contractions   Massage and therapeutic touch by your support person or labor    Reading materials that  are comforting or inspiring   Music that is soothing   Hypnosis   Acupuncture and acupressure   Heat and cold application    Help From Analgesics   Analgesics are mild medications that reduce pain. They can be used along with some relaxation methods. They can give you pain relief without total loss of feeling. They may lessen the pain of strong contractions. You may feel well enough to nap between contractions. They have little effect on your baby if given early in labor. This may be done by injection or by IV.     Help From Anesthetics   Anesthesia involves blockage of all feeling including pain. It can be given in the form of local anesthesia or general anesthesia.  Anesthesia is a type of medication to prevent pain. It is often used in labor. It may numb only one region of your body. This is called regional anesthesia. Or it may let you sleep during surgery. This is called general anesthesia. This type of medication is given by a trained specialist. When possible, regional anesthesia will be used. This is so you can be awake during your baby s birth.     Regional Anesthesia   Regional anesthesia may be used to numb your lower body for a vaginal or  birth. It does not go into your bloodstream. This means that little or none of it will reach your baby. There are two kinds:   Epidural. This is most often given while you sit up or lie on your side. A needle with a flexible tube (catheter) is put in your lower back. The needle is then removed. The anesthetic is sent through the catheter. A pump may be attached. This gives you a constant level of anesthetic. An epidural often only partly affects muscle control. This means you will still be able to push for a vaginal birth.   Spinal. This is most often given in one dose right before delivery. It acts fast. You may sit up or lie down when it is injected. It may affect muscle control in your lower body. This includes the ability to push.    General Anesthesia    General anesthesia lets you sleep and keeps you free of pain during surgery. It may be used for a . It may be given as an injection. It may be given as an inhaled gas. Or it may be given as both. Delivery often occurs before the medication has reached the baby.    [place] parenting classes [email or web address]  [place] parenting classes [email or web address]  Free online classes through [employer]

## 2022-10-26 ENCOUNTER — PRENATAL OFFICE VISIT (OUTPATIENT)
Dept: FAMILY MEDICINE | Facility: CLINIC | Age: 27
End: 2022-10-26
Payer: COMMERCIAL

## 2022-10-26 VITALS
WEIGHT: 258.9 LBS | DIASTOLIC BLOOD PRESSURE: 52 MMHG | HEART RATE: 93 BPM | OXYGEN SATURATION: 96 % | BODY MASS INDEX: 35.11 KG/M2 | SYSTOLIC BLOOD PRESSURE: 106 MMHG | RESPIRATION RATE: 20 BRPM | TEMPERATURE: 96 F

## 2022-10-26 DIAGNOSIS — O99.210 OBESITY AFFECTING PREGNANCY, ANTEPARTUM: ICD-10-CM

## 2022-10-26 DIAGNOSIS — Z34.00 SUPERVISION OF NORMAL FIRST PREGNANCY, ANTEPARTUM: Primary | ICD-10-CM

## 2022-10-26 PROCEDURE — 99207 PR PRENATAL VISIT: CPT | Performed by: FAMILY MEDICINE

## 2022-10-26 ASSESSMENT — ASTHMA QUESTIONNAIRES
QUESTION_3 LAST FOUR WEEKS HOW OFTEN DID YOUR ASTHMA SYMPTOMS (WHEEZING, COUGHING, SHORTNESS OF BREATH, CHEST TIGHTNESS OR PAIN) WAKE YOU UP AT NIGHT OR EARLIER THAN USUAL IN THE MORNING: ONCE A WEEK
ACT_TOTALSCORE: 14
QUESTION_5 LAST FOUR WEEKS HOW WOULD YOU RATE YOUR ASTHMA CONTROL: SOMEWHAT CONTROLLED
ACT_TOTALSCORE: 14
ACUTE_EXACERBATION_TODAY: NO
QUESTION_2 LAST FOUR WEEKS HOW OFTEN HAVE YOU HAD SHORTNESS OF BREATH: MORE THAN ONCE A DAY
QUESTION_4 LAST FOUR WEEKS HOW OFTEN HAVE YOU USED YOUR RESCUE INHALER OR NEBULIZER MEDICATION (SUCH AS ALBUTEROL): TWO OR THREE TIMES PER WEEK
QUESTION_1 LAST FOUR WEEKS HOW MUCH OF THE TIME DID YOUR ASTHMA KEEP YOU FROM GETTING AS MUCH DONE AT WORK, SCHOOL OR AT HOME: A LITTLE OF THE TIME

## 2022-10-26 ASSESSMENT — PAIN SCALES - GENERAL: PAINLEVEL: NO PAIN (0)

## 2022-11-04 DIAGNOSIS — O21.9 NAUSEA AND VOMITING DURING PREGNANCY: ICD-10-CM

## 2022-11-05 NOTE — TELEPHONE ENCOUNTER
"Routing refill request to provider for review/approval because:  Needs review    Last Written Prescription Date:  8/24/22  Last Fill Quantity: 30,  # refills: 1   Last office visit provider:  10/26/22     Requested Prescriptions   Pending Prescriptions Disp Refills     ondansetron (ZOFRAN ODT) 4 MG ODT tab [Pharmacy Med Name: ONDANSETRON ODT 4MG TABLETS] 30 tablet 1     Sig: DISSOLVE 1 TABLET(4 MG) ON THE TONGUE EVERY 8 HOURS AS NEEDED FOR NAUSEA OR VOMITING        Antivertigo/Antiemetic Agents Passed - 11/4/2022  9:42 AM        Passed - Recent (12 mo) or future (30 days) visit within the authorizing provider's specialty     Patient has had an office visit with the authorizing provider or a provider within the authorizing providers department within the previous 12 mos or has a future within next 30 days. See \"Patient Info\" tab in inbasket, or \"Choose Columns\" in Meds & Orders section of the refill encounter.              Passed - Medication is active on med list        Passed - Patient is 18 years of age or older             Lisa Tom RN 11/05/22 5:29 PM  "

## 2022-11-06 RX ORDER — ONDANSETRON 4 MG/1
TABLET, ORALLY DISINTEGRATING ORAL
Qty: 30 TABLET | Refills: 1 | Status: SHIPPED | OUTPATIENT
Start: 2022-11-06 | End: 2023-01-18

## 2022-11-09 ENCOUNTER — PRENATAL OFFICE VISIT (OUTPATIENT)
Dept: FAMILY MEDICINE | Facility: CLINIC | Age: 27
End: 2022-11-09
Payer: COMMERCIAL

## 2022-11-09 ENCOUNTER — VIRTUAL VISIT (OUTPATIENT)
Dept: NEUROSURGERY | Facility: CLINIC | Age: 27
End: 2022-11-09
Payer: COMMERCIAL

## 2022-11-09 VITALS
WEIGHT: 264.5 LBS | TEMPERATURE: 98.6 F | BODY MASS INDEX: 35.87 KG/M2 | SYSTOLIC BLOOD PRESSURE: 120 MMHG | HEART RATE: 86 BPM | DIASTOLIC BLOOD PRESSURE: 80 MMHG | OXYGEN SATURATION: 99 %

## 2022-11-09 DIAGNOSIS — J45.30 MILD PERSISTENT ASTHMA WITHOUT COMPLICATION: ICD-10-CM

## 2022-11-09 DIAGNOSIS — O99.210 OBESITY AFFECTING PREGNANCY, ANTEPARTUM: ICD-10-CM

## 2022-11-09 DIAGNOSIS — Z34.00 SUPERVISION OF NORMAL FIRST PREGNANCY, ANTEPARTUM: Primary | ICD-10-CM

## 2022-11-09 DIAGNOSIS — M54.16 LUMBAR RADICULOPATHY, ACUTE: Primary | ICD-10-CM

## 2022-11-09 LAB — HGB BLD-MCNC: 12.2 G/DL (ref 11.7–15.7)

## 2022-11-09 PROCEDURE — 99024 POSTOP FOLLOW-UP VISIT: CPT | Mod: 95 | Performed by: PHYSICIAN ASSISTANT

## 2022-11-09 PROCEDURE — 85018 HEMOGLOBIN: CPT | Performed by: FAMILY MEDICINE

## 2022-11-09 PROCEDURE — 87653 STREP B DNA AMP PROBE: CPT | Performed by: FAMILY MEDICINE

## 2022-11-09 PROCEDURE — 90686 IIV4 VACC NO PRSV 0.5 ML IM: CPT | Performed by: FAMILY MEDICINE

## 2022-11-09 PROCEDURE — 90471 IMMUNIZATION ADMIN: CPT | Performed by: FAMILY MEDICINE

## 2022-11-09 PROCEDURE — 36415 COLL VENOUS BLD VENIPUNCTURE: CPT | Performed by: FAMILY MEDICINE

## 2022-11-09 PROCEDURE — 99207 PR PRENATAL VISIT: CPT | Performed by: FAMILY MEDICINE

## 2022-11-09 ASSESSMENT — PAIN SCALES - GENERAL: PAINLEVEL: SEVERE PAIN (7)

## 2022-11-09 NOTE — PROGRESS NOTES
Clinic Note - Return OB Visit    Ailin Calhoun is a 27 year old  female who presents to clinic for a follow up OB visit. She is currently 37w0d with an estimated date of delivery of 2022 via LMP c/w 1st tri US. She denies headache, chest pain, shortness of breath, abdominal pain/contractions, vaginal bleeding, or abnormal discharge. She has felt baby move.     New concerns today:   -having some pelvic pressure - feeling very tired    OB History    Para Term  AB Living   1 0 0 0 0 0   SAB IAB Ectopic Multiple Live Births   0 0 0 0 0      # Outcome Date GA Lbr Jayesh/2nd Weight Sex Delivery Anes PTL Lv   1 Current                Physical exam:  /80   Pulse 86   Temp 98.6  F (37  C)   Wt 120 kg (264 lb 8 oz)   LMP 2022   SpO2 99%   BMI 35.87 kg/m      General: alert, female in no acute distress  Abdomen: gravid uterus appropriate for gestation age at 36 cm, non-tender, FHTs 140  Extremities: no edema    Encounter for supervision of normal first pregnancy in second trimester  Obesity affecting pregnancy, antepartum  G1 at 37+0 weeks today. Pregnancy complicated by obesity (pregravid BMI 32.08), depression (on selective serotonin reuptake inhibitor), asthma (mild, well controlled). Baby measuring large on US (98%ile at 32 weeks) - recheck scheduled for .        Reviewed pre-verena labs: O pos, invitae nml, baby boy    Follow up in 2 weeks for routine prenatal visit     Lainey Tillman MD  [facility]

## 2022-11-09 NOTE — PROGRESS NOTES
"Ailin is a 27 year old who is being evaluated via a billable video visit.      How would you like to obtain your AVS? MyChart  If the video visit is dropped, the invitation should be resent by: Text to cell phone: [phone]  Will anyone else be joining your video visit? No        Video-Visit Details    Video Start Time: 8:02a    Type of service:  Video Visit    Video End Time:8:06a    Originating Location (pt. Location): Home        Distant Location (provider location):  On-site    Platform used for Video Visit: [facility]  Department of Neurosurgery  [facility]    Name: Ailin Calhoun  MRN: [id number]  Age: 27 year old  : 2022      Chief Complaint:   L4-5 large right disc herniation s/p right sided L4-5 microdiscectomy and hemilaminectomy, decompression of L4-5 level, decompression of right L5 nerve root 2022, follow up    History of Present Illness:   Ailin Calhoun is a 27 year old female with a history of asthma who is seen today via video visit for follow up. She presented to the Emergency Department 22 with right leg pain, numbness, and weakness, found to have a large right disc herniation at the L4-5 level. Neurosurgery consulted with OB/Gyn who together formulated a surgical plan given her acute right sided radiculopathy with hopes to prevent further neurologic dysfunction. She underwent the above mentioned procedure with Dr. Owen and had an uncomplicated hospital course, discharging home on 2022. She is 37 weeks pregnant. She's doing well. Occasionally has \"pulling\" sensation down the back of right leg and tingling in her toes intermittently; no new or worsening symptoms, denies new pain.   She has a growth scan to determine if she'll be induced, that's scheduled for Friday this week. Has not had postop Xrays done which is completely reasonable.    Review of Systems:   Pertinent items are noted in " HPI or as in patient entered ROS below, remainder of complete ROS is negative.     Physical Exam:   Exam limited due to video visit. Face symmetric. Speech is normal.    Imaging:  No new imaging to review today.     Assessment:  L4-5 large right disc herniation s/p right sided L4-5 microdiscectomy and hemilaminectomy, decompression of L4-5 level, decompression of right L5 nerve root 9/17/2022, follow up    Plan:  We discussed follow up by phone around the first of the year to check in. She'll get xrays done sometimes after delivery at her convenience and I can call with results. She was encouraged to reach out sooner with new symptoms or concerns.       Katlin Schultz PA-C  Department of Neurosurgery

## 2022-11-09 NOTE — PATIENT INSTRUCTIONS
Please let me know if you have any new symptoms or concerns, and I will plan on calling you after the first of the year.  Take care!

## 2022-11-09 NOTE — PATIENT INSTRUCTIONS
Phone Numbers:  [facility]: [phone]  [facility]: [phone]     When to call or come in to the hospital  If you notice a decrease in your baby's movement.   If your begin to experience contractions that are 5 minutes or less apart and lasting for over 45 seconds, or if contractions are becoming more painful.  If you have any bleeding or leakage of fluids.   If you have a headache not resolved with tylenol, right upper abdominal pain, or sudden onset of swelling.  You know your body best. Never hesitate to call or go to the hospital if something doesn't feel right!    Preparing for the hospital  You re likely feeling anxious as your child s birth approaches. This is normal. To give yourself some peace of mind, pack a bag 3-4 weeks before your due date. Here is a list of things to remember:  Personal care items like toothbrush, hair brush, lip balm, lotion, shampoo, glasses, contacts  Nightgown, bathrobe, slippers  Several pairs of underwear  Comfortable clothes for you to wear home  Camera with new batteries  Cell phone and   Insurance information and any other paperwork needed for your hospital stay  Clothes for baby to wear home  An infant, rear-facing car seat for bringing home your baby (this is required by law)

## 2022-11-09 NOTE — LETTER
"2022       RE: Ailin Calhoun  [address]  [place] [address]     Dear Colleague,    Thank you for referring your patient, Ailin Calhoun, to the [facility] [place] [facility]. Please see a copy of my visit note below.    [facility]  Department of Neurosurgery  Center for Skull Base and Pituitary Surgery    Name: Ailin Calhoun  MRN: [id number]  Age: 27 year old  : 2022      Chief Complaint:   L4-5 large right disc herniation s/p right sided L4-5 microdiscectomy and hemilaminectomy, decompression of L4-5 level, decompression of right L5 nerve root 2022, follow up    History of Present Illness:   Ailin Calhoun is a 27 year old female with a history of asthma who is seen today via video visit for follow up. She presented to the Emergency Department 22 with right leg pain, numbness, and weakness, found to have a large right disc herniation at the L4-5 level. Neurosurgery consulted with OB/Gyn who together formulated a surgical plan given her acute right sided radiculopathy with hopes to prevent further neurologic dysfunction. She underwent the above mentioned procedure with Dr. Owen and had an uncomplicated hospital course, discharging home on 2022. She is 37 weeks pregnant. She's doing well. Occasionally has \"pulling\" sensation down the back of right leg and tingling in her toes intermittently; no new or worsening symptoms, denies new pain.   She has a growth scan to determine if she'll be induced, that's scheduled for Friday this week. Has not had postop Xrays done which is completely reasonable.    Review of Systems:   Pertinent items are noted in HPI or as in patient entered ROS below, remainder of complete ROS is negative.     Physical Exam:   Exam limited due to video visit. Face symmetric. Speech is normal.    Imaging:  No new imaging to review today.   "   Assessment:  L4-5 large right disc herniation s/p right sided L4-5 microdiscectomy and hemilaminectomy, decompression of L4-5 level, decompression of right L5 nerve root 9/17/2022, follow up    Plan:  We discussed follow up by phone around the first of the year to check in. She'll get xrays done sometimes after delivery at her convenience and I can call with results. She was encouraged to reach out sooner with new symptoms or concerns.       Katlin Schultz PA-C  Department of Neurosurgery

## 2022-11-10 LAB — GP B STREP DNA SPEC QL NAA+PROBE: NEGATIVE

## 2022-11-11 ENCOUNTER — HOSPITAL ENCOUNTER (OUTPATIENT)
Dept: ULTRASOUND IMAGING | Facility: CLINIC | Age: 27
Discharge: HOME | End: 2022-11-11
Attending: FAMILY MEDICINE | Admitting: FAMILY MEDICINE
Payer: COMMERCIAL

## 2022-11-11 DIAGNOSIS — O99.210 OBESITY AFFECTING PREGNANCY, ANTEPARTUM: ICD-10-CM

## 2022-11-11 DIAGNOSIS — Z34.00 SUPERVISION OF NORMAL FIRST PREGNANCY, ANTEPARTUM: ICD-10-CM

## 2022-11-11 PROCEDURE — 76816 OB US FOLLOW-UP PER FETUS: CPT

## 2022-11-16 ENCOUNTER — PRENATAL OFFICE VISIT (OUTPATIENT)
Dept: FAMILY MEDICINE | Facility: CLINIC | Age: 27
End: 2022-11-16
Payer: COMMERCIAL

## 2022-11-16 ENCOUNTER — MYC MEDICAL ADVICE (OUTPATIENT)
Dept: FAMILY MEDICINE | Facility: CLINIC | Age: 27
End: 2022-11-16

## 2022-11-16 VITALS
HEART RATE: 123 BPM | DIASTOLIC BLOOD PRESSURE: 80 MMHG | SYSTOLIC BLOOD PRESSURE: 110 MMHG | TEMPERATURE: 97.9 F | WEIGHT: 266.13 LBS | BODY MASS INDEX: 36.09 KG/M2 | OXYGEN SATURATION: 98 %

## 2022-11-16 DIAGNOSIS — Z34.00 SUPERVISION OF NORMAL FIRST PREGNANCY, ANTEPARTUM: Primary | ICD-10-CM

## 2022-11-16 DIAGNOSIS — O99.210 OBESITY AFFECTING PREGNANCY, ANTEPARTUM: ICD-10-CM

## 2022-11-16 DIAGNOSIS — M21.371 RIGHT FOOT DROP: ICD-10-CM

## 2022-11-16 DIAGNOSIS — G57.01 SCIATIC NERVE DISEASE, RIGHT: ICD-10-CM

## 2022-11-16 DIAGNOSIS — J45.21 MILD INTERMITTENT ASTHMA WITH EXACERBATION: ICD-10-CM

## 2022-11-16 DIAGNOSIS — M79.18 MYOFASCIAL PAIN: ICD-10-CM

## 2022-11-16 PROCEDURE — 99207 PR PRENATAL VISIT: CPT | Performed by: FAMILY MEDICINE

## 2022-11-16 RX ORDER — PREDNISONE 20 MG/1
40 TABLET ORAL DAILY
Qty: 10 TABLET | Refills: 0 | Status: SHIPPED | OUTPATIENT
Start: 2022-11-16 | End: 2022-11-21

## 2022-11-16 ASSESSMENT — PAIN SCALES - GENERAL: PAINLEVEL: SEVERE PAIN (6)

## 2022-11-16 NOTE — PROGRESS NOTES
Clinic Note - Return OB Visit    Ailin Calhoun is a 27 year old  female who presents to clinic for a follow up OB visit. She is currently 38w0d with an estimated date of delivery of 2022 via LMP c/w 1st tri US. She denies headache, chest pain, shortness of breath, abdominal pain/contractions, vaginal bleeding, or abnormal discharge. She has felt baby move.     New concerns today:   -more pressure than previously  -feels like can breathe better now  -acute illness - COVID neg - feels like asthma is flaring d/t illness    OB History    Para Term  AB Living   1 0 0 0 0 0   SAB IAB Ectopic Multiple Live Births   0 0 0 0 0      # Outcome Date GA Lbr Jayesh/2nd Weight Sex Delivery Anes PTL Lv   1 Current                Physical exam:  /80   Pulse (!) 123   Temp 97.9  F (36.6  C)   Wt 120.7 kg (266 lb 2 oz)   LMP 2022   SpO2 98%   BMI 36.09 kg/m      General: alert, female in no acute distress  Abdomen: gravid uterus at 35 cm, non-tender, FHTs 145  Extremities: no edema    Encounter for supervision of normal first pregnancy in second trimester  Obesity affecting pregnancy, antepartum  G1 at 38+0 weeks today. Pregnancy complicated by obesity (pregravid BMI 32.08), depression (on selective serotonin reuptake inhibitor), asthma (current exacerbation - prednisone prescribed today). Baby measuring large on US (98%ile at 32 weeks > 90% at 37 weeks).       Reviewed pre- labs: O pos, invitae nml, baby boy    Follow up in 1 week for routine prenatal visit     Lainey Tillman MD  [facility]

## 2022-11-16 NOTE — PATIENT INSTRUCTIONS
Phone Numbers:  [facility]: [phone]  [facility]: [phone]     When to call or come in to the hospital  If you notice a decrease in your baby's movement.   If your begin to experience contractions that are 5 minutes or less apart and lasting for over 45 seconds, or if contractions are becoming more painful.  If you have any bleeding or leakage of fluids.   If you have a headache not resolved with tylenol, right upper abdominal pain, or sudden onset of swelling.  You know your body best. Never hesitate to call or go to the hospital if something doesn't feel right!    Preparing for the hospital  You re likely feeling anxious as your child s birth approaches. This is normal. To give yourself some peace of mind, pack a bag 3-4 weeks before your due date. Here is a list of things to remember:  Personal care items like toothbrush, hair brush, lip balm, lotion, shampoo, glasses, contacts  Nightgown, bathrobe, slippers  Several pairs of underwear  Comfortable clothes for you to wear home  Camera with new batteries  Cell phone and   Insurance information and any other paperwork needed for your hospital stay  Clothes for baby to wear home  An infant, rear-facing car seat for bringing home your baby (this is required by law)

## 2022-11-18 RX ORDER — GABAPENTIN 100 MG/1
CAPSULE ORAL
Qty: 90 CAPSULE | Refills: 0 | Status: ON HOLD | OUTPATIENT
Start: 2022-11-18 | End: 2022-12-02

## 2022-11-19 ENCOUNTER — HOSPITAL ENCOUNTER (EMERGENCY)
Facility: CLINIC | Age: 27
Discharge: HOME | End: 2022-11-19
Attending: EMERGENCY MEDICINE | Admitting: EMERGENCY MEDICINE
Payer: COMMERCIAL

## 2022-11-19 ENCOUNTER — MYC MEDICAL ADVICE (OUTPATIENT)
Dept: FAMILY MEDICINE | Facility: CLINIC | Age: 27
End: 2022-11-19

## 2022-11-19 ENCOUNTER — NURSE TRIAGE (OUTPATIENT)
Dept: NURSING | Facility: CLINIC | Age: 27
End: 2022-11-19

## 2022-11-19 ENCOUNTER — TRANSFERRED RECORDS (OUTPATIENT)
Dept: EMERGENCY MEDICINE | Facility: CLINIC | Age: 27
End: 2022-11-19

## 2022-11-19 ENCOUNTER — APPOINTMENT (OUTPATIENT)
Dept: ULTRASOUND IMAGING | Facility: CLINIC | Age: 27
End: 2022-11-19
Attending: EMERGENCY MEDICINE
Payer: COMMERCIAL

## 2022-11-19 VITALS
DIASTOLIC BLOOD PRESSURE: 73 MMHG | SYSTOLIC BLOOD PRESSURE: 132 MMHG | WEIGHT: 266 LBS | RESPIRATION RATE: 26 BRPM | TEMPERATURE: 96.9 F | HEART RATE: 110 BPM | BODY MASS INDEX: 36.03 KG/M2 | HEIGHT: 72 IN | OXYGEN SATURATION: 94 %

## 2022-11-19 DIAGNOSIS — J10.1 INFLUENZA A: ICD-10-CM

## 2022-11-19 DIAGNOSIS — J45.41 MODERATE PERSISTENT ASTHMA WITH EXACERBATION: ICD-10-CM

## 2022-11-19 LAB
ANION GAP SERPL CALCULATED.3IONS-SCNC: 13 MMOL/L (ref 5–18)
ATRIAL RATE - MUSE: 94 BPM
BASOPHILS # BLD AUTO: 0 10E3/UL (ref 0–0.2)
BASOPHILS NFR BLD AUTO: 0 %
BUN SERPL-MCNC: 7 MG/DL (ref 8–22)
CALCIUM SERPL-MCNC: 9.4 MG/DL (ref 8.5–10.5)
CHLORIDE BLD-SCNC: 107 MMOL/L (ref 98–107)
CO2 SERPL-SCNC: 17 MMOL/L (ref 22–31)
CREAT SERPL-MCNC: 0.64 MG/DL (ref 0.6–1.1)
D DIMER PPP FEU-MCNC: 1.2 UG/ML FEU (ref 0–0.5)
DIASTOLIC BLOOD PRESSURE - MUSE: NORMAL MMHG
EOSINOPHIL # BLD AUTO: 0 10E3/UL (ref 0–0.7)
EOSINOPHIL NFR BLD AUTO: 0 %
ERYTHROCYTE [DISTWIDTH] IN BLOOD BY AUTOMATED COUNT: 13.9 % (ref 10–15)
FLUAV RNA SPEC QL NAA+PROBE: POSITIVE
FLUBV RNA RESP QL NAA+PROBE: NEGATIVE
GFR SERPL CREATININE-BSD FRML MDRD: >90 ML/MIN/1.73M2
GLUCOSE BLD-MCNC: 121 MG/DL (ref 70–125)
HCT VFR BLD AUTO: 36.8 % (ref 35–47)
HGB BLD-MCNC: 12.4 G/DL (ref 11.7–15.7)
IMM GRANULOCYTES # BLD: 0.1 10E3/UL
IMM GRANULOCYTES NFR BLD: 1 %
INTERPRETATION ECG - MUSE: NORMAL
LYMPHOCYTES # BLD AUTO: 0.8 10E3/UL (ref 0.8–5.3)
LYMPHOCYTES NFR BLD AUTO: 6 %
MCH RBC QN AUTO: 27.6 PG (ref 26.5–33)
MCHC RBC AUTO-ENTMCNC: 33.7 G/DL (ref 31.5–36.5)
MCV RBC AUTO: 82 FL (ref 78–100)
MONOCYTES # BLD AUTO: 0.5 10E3/UL (ref 0–1.3)
MONOCYTES NFR BLD AUTO: 4 %
NEUTROPHILS # BLD AUTO: 11.3 10E3/UL (ref 1.6–8.3)
NEUTROPHILS NFR BLD AUTO: 89 %
NRBC # BLD AUTO: 0 10E3/UL
NRBC BLD AUTO-RTO: 0 /100
P AXIS - MUSE: 57 DEGREES
PLATELET # BLD AUTO: 215 10E3/UL (ref 150–450)
POTASSIUM BLD-SCNC: 4.2 MMOL/L (ref 3.5–5)
PR INTERVAL - MUSE: 124 MS
QRS DURATION - MUSE: 80 MS
QT - MUSE: 356 MS
QTC - MUSE: 445 MS
R AXIS - MUSE: 66 DEGREES
RBC # BLD AUTO: 4.49 10E6/UL (ref 3.8–5.2)
RSV RNA SPEC NAA+PROBE: NEGATIVE
SARS-COV-2 RNA RESP QL NAA+PROBE: NEGATIVE
SODIUM SERPL-SCNC: 137 MMOL/L (ref 136–145)
SYSTOLIC BLOOD PRESSURE - MUSE: NORMAL MMHG
T AXIS - MUSE: 45 DEGREES
TROPONIN I SERPL-MCNC: 0.02 NG/ML (ref 0–0.29)
VENTRICULAR RATE- MUSE: 94 BPM
WBC # BLD AUTO: 12.7 10E3/UL (ref 4–11)

## 2022-11-19 PROCEDURE — 80048 BASIC METABOLIC PNL TOTAL CA: CPT | Performed by: EMERGENCY MEDICINE

## 2022-11-19 PROCEDURE — 999N000105 HC STATISTIC NO DOCUMENTATION TO SUPPORT CHARGE

## 2022-11-19 PROCEDURE — 84484 ASSAY OF TROPONIN QUANT: CPT | Performed by: EMERGENCY MEDICINE

## 2022-11-19 PROCEDURE — 93005 ELECTROCARDIOGRAM TRACING: CPT | Performed by: EMERGENCY MEDICINE

## 2022-11-19 PROCEDURE — 99285 EMERGENCY DEPT VISIT HI MDM: CPT | Mod: CS,25

## 2022-11-19 PROCEDURE — 250N000009 HC RX 250: Performed by: EMERGENCY MEDICINE

## 2022-11-19 PROCEDURE — 85025 COMPLETE CBC W/AUTO DIFF WBC: CPT | Performed by: EMERGENCY MEDICINE

## 2022-11-19 PROCEDURE — 87637 SARSCOV2&INF A&B&RSV AMP PRB: CPT | Performed by: EMERGENCY MEDICINE

## 2022-11-19 PROCEDURE — 94640 AIRWAY INHALATION TREATMENT: CPT

## 2022-11-19 PROCEDURE — 36415 COLL VENOUS BLD VENIPUNCTURE: CPT | Performed by: EMERGENCY MEDICINE

## 2022-11-19 PROCEDURE — 93970 EXTREMITY STUDY: CPT

## 2022-11-19 PROCEDURE — 85379 FIBRIN DEGRADATION QUANT: CPT | Performed by: EMERGENCY MEDICINE

## 2022-11-19 PROCEDURE — C9803 HOPD COVID-19 SPEC COLLECT: HCPCS

## 2022-11-19 RX ORDER — ALBUTEROL SULFATE 0.83 MG/ML
5 SOLUTION RESPIRATORY (INHALATION) ONCE
Status: COMPLETED | OUTPATIENT
Start: 2022-11-19 | End: 2022-11-19

## 2022-11-19 RX ORDER — ALBUTEROL SULFATE 0.83 MG/ML
5 SOLUTION RESPIRATORY (INHALATION) EVERY 6 HOURS PRN
Qty: 75 ML | Refills: 0 | Status: SHIPPED | OUTPATIENT
Start: 2022-11-19

## 2022-11-19 RX ADMIN — ALBUTEROL SULFATE 5 MG: 2.5 SOLUTION RESPIRATORY (INHALATION) at 23:14

## 2022-11-19 ASSESSMENT — ENCOUNTER SYMPTOMS
WHEEZING: 1
FEVER: 0
FATIGUE: 1
SHORTNESS OF BREATH: 1
SORE THROAT: 1

## 2022-11-19 ASSESSMENT — ACTIVITIES OF DAILY LIVING (ADL)
ADLS_ACUITY_SCORE: 35
ADLS_ACUITY_SCORE: 35

## 2022-11-19 NOTE — TELEPHONE ENCOUNTER
38w3d    Steroids prescribed for her breathing.  5 days 40mg on 11/16/22.    Not improved.    Now also experiencing:  Chest pain.  Difficulty breathing    Per the protocol I instructed she be seen in ER.  Caller stated understanding and agreement.      Reason for Disposition    Chest pain    Difficulty breathing    Additional Information    Negative: SEVERE difficulty breathing (e.g., struggling for each breath, speaks in single words)    Negative: [1] Breathing stopped AND [2] hasn't returned    Negative: Choking on something    Negative: Bluish (or gray) lips or face now    Negative: Difficult to awaken or acting confused (e.g., disoriented, slurred speech)    Negative: Passed out (i.e., lost consciousness, collapsed and was not responding)    Negative: Wheezing started suddenly after medicine, an allergic food or bee sting    Negative: Stridor    Negative: Slow, shallow and weak breathing    Negative: Sounds like a life-threatening emergency to the triager    Negative: SEVERE difficulty breathing (e.g., struggling for each breath, speaks in single words)    Negative: Difficult to awaken or acting confused (e.g., disoriented, slurred speech)    Negative: Shock suspected (e.g., cold/pale/clammy skin, too weak to stand, low BP, rapid pulse)    Negative: Passed out (i.e., lost consciousness, collapsed and was not responding)    Negative: [1] Chest pain lasts > 5 minutes AND [2] age > 44    Negative: [1] Chest pain lasts > 5 minutes AND [2] age > 30 AND [3] one or more cardiac risk factors (e.g., diabetes, high blood pressure, high cholesterol, smoker, or strong family history of heart disease)    Negative: [1] Chest pain lasts > 5 minutes AND [2] history of heart disease (i.e., angina, heart attack, heart failure, bypass surgery, takes nitroglycerin)    Negative: [1] Chest pain lasts > 5 minutes AND [2] described as crushing, pressure-like, or heavy    Negative: Heart beating < 50 beats per minute OR > 140 beats per  "minute    Negative: Visible sweat on face or sweat dripping down face    Negative: Sounds like a life-threatening emergency to the triager    Negative: Followed a chest injury    Negative: SEVERE chest pain    Negative: [1] Chest pain (or \"angina\") comes and goes AND [2] is happening more often (increasing in frequency) or getting worse (increasing in severity) (Exception: chest pains that last only a few seconds)    Negative: Pain also in shoulder(s) or arm(s) or jaw (Exception: pain is clearly made worse by movement)    Protocols used: BREATHING DIFFICULTY-A-AH, CHEST PAIN-A-AH    Priyanka NOLEN RN [facility]     "

## 2022-11-20 NOTE — DISCHARGE INSTRUCTIONS
Influenza A testing was positive.  In addition you have findings consistent with acute asthma.  I believe that these are the source of your symptoms.  Your ultrasound was negative for blood clots in your legs.  I recommend continuing the prednisone as previously prescribed.  Albuterol as needed for wheezing.  If you have escalation to your symptoms develop increasing shortness of breath please return to emergency department for repeat assessment.  In addition, as we discussed your blood pressure is elevated here in the emergency department.  This will need reassessment on Monday with your obstetrician and will need to be tracked closely.  If you develop any headache you develop increasing swelling visual changes please return immediately to the emergency department for repeat assessment.

## 2022-11-20 NOTE — ED TRIAGE NOTES
Pt has been feeling sick since Sunday and has been having SOB. Pt has hx of asthma She was seen by her MD and given prednisone. She is on day 5 and today she developed mid sternal chest pain with increased SOB. She has had to increase her inhaler use at home, and took tylenol and mucinex about 2 hours PTA. Pt is 38 weeks pregnant.     Triage Assessment     Row Name 11/19/22 1931       Triage Assessment (Adult)    Airway WDL WDL       Respiratory WDL    Respiratory WDL X;rhythm/pattern    Rhythm/Pattern, Respiratory tachypneic       Skin Circulation/Temperature WDL    Skin Circulation/Temperature WDL WDL       Cardiac WDL    Cardiac WDL X  HTN       Peripheral/Neurovascular WDL    Peripheral Neurovascular WDL WDL       Cognitive/Neuro/Behavioral WDL    Cognitive/Neuro/Behavioral WDL WDL

## 2022-11-20 NOTE — ED PROVIDER NOTES
EMERGENCY DEPARTMENT ENCOUNTER      NAME: Ailin Calhoun  AGE: 27 year old female  YOB: 1995  MRN: [id number]  EVALUATION DATE & TIME: 2022  7:51 PM    PCP: Lainey Tillman    ED PROVIDER: Jennfier Mahajan MD    Chief Complaint   Patient presents with     Chest Pain     Shortness of Breath     FINAL IMPRESSION:  1. Influenza A    2. Moderate persistent asthma with exacerbation        ED COURSE & MEDICAL DECISION MAKING:    Pertinent Labs & Imaging studies reviewed. (See chart for details)  27 year old female presents to the Emergency Department for evaluation of general malaise nonproductive cough body aches shortness of breath and wheezing.  Patient is 38 weeks pregnant.  .  Reports the pregnancy has proceeded without complication.  She has a history of asthma.  Started to feel ill on  with mild sore throat fever cough body aches and increased wheezing.  Saw her obstetrician who prescribed asthma for treatment of acute asthmatic exacerbation in the setting of viral illness symptoms have persisted despite the prednisone therapy.  She has been utilizing albuterol.  Contacted nurse triage line as she had some anterior chest discomfort that she described as a sharp pain associated with her breathing and subsequently presented emergency department for assessment.  On examination patient was well-appearing.  There was some mild wheezing noted posteriorly but no respiratory distress there was mild elevation to the respiratory rate her oxygenation was normal on room air.  She received a nebulization treatment here in the emergency department.  She is already on prednisone with 1 more day in her treatment.  Suspected viral illness.  Influenza a testing was positive.  With regard to her pain seems most likely related to her acute asthma and acute influenza.  ECG was nonischemic.  Lower extremity ultrasound was negative for DVT.  Awaiting laboratory testing.  We will plan to reassess  after return of her testing results.    12:08 AM  Influenza A testing was positive as above.  Clinical history examination work-up consistent with acute viral upper respiratory infection and associated asthmatic exacerbation made more complicated by late third trimester pregnancy.  Already on prednisone.  No respiratory distress.  Maintaining O2 saturations.  At this point I did not feel that chest x-ray imaging was indicated as I had low pretest probability for acute pneumonia or bacterial infection.  This seems viral mediated.  She is already on prednisone and is not in respiratory distress is oxygenating normally I think we would continue her prednisone therapy at this time.  Relative to the chest pain symptoms I believe this is secondary to her acute viral illness.  No lower extremity DVT on ultrasound.  D-dimer was elevated likely secondary to pregnancy and influenza.  I did discuss this with the patient.  She is not interested in pursuing CT imaging of the chest at this time and I think given alternative explanation for her symptoms in the setting of a negative lower extremity ultrasound again very low pretest probability that there is other acute pathology at this time.  She was noted to be hypertensive at arrival to the emergency department.  That improved over the course of her stay in the emergency department and post nebulization treatment.  Likely a combination of factors she has no headache she has no swelling appreciated on examination she has no visual changes or other signs or symptoms to suggest preeclampsia at this point.  BP is trending downward.  I recommended she have a follow-up recheck of her blood pressure on Monday through her obstetrician.  I also reviewed with her return precautions prior to discharge patient is comfortable this plan of care.    ED Course as of 11/20/22 0008   Sat Nov 19, 2022   1933 ECG 12-LEAD WITH MUSE ([facility])     8:10 PM I met with the patient, obtained history,  "performed an initial exam, and discussed options and plan for diagnostics and treatment here in the ED. PPE worn includes N95 mask and gloves.      At the conclusion of the encounter I discussed the results of all of the tests and the disposition. The questions were answered. The patient or family acknowledged understanding and was agreeable with the care plan.     MEDICATIONS GIVEN IN THE EMERGENCY:  Medications   albuterol (PROVENTIL) neb solution 5 mg (5 mg Nebulization Given 11/19/22 1031)       NEW PRESCRIPTIONS STARTED AT TODAY'S ER VISIT  Discharge Medication List as of 11/19/2022 11:13 PM      START taking these medications    Details   albuterol (PROVENTIL) (2.5 MG/3ML) 0.083% neb solution Take 2 vials (5 mg) by nebulization every 6 hours as needed for shortness of breath / dyspnea or wheezing, Disp-75 mL, R-0, Local Print      Respiratory Therapy Supplies (NEBULIZER) SANDRA Take 1 Device by mouth every 6 hours as needed (sob cough), Disp-1 each, R-0, Local PrintInclude tubing and mask for age please.                =================================================================    HPI    Patient information was obtained from: patient    Use of : N/A      Ailin Calhoun is a 27 year old female with a pertinent history of asthma, hx of abdominal cervical pap smear, lumbar herniated disc, and s/p Hemilaminectomy, discectomy lumbar one level, combined  who presents to this ED via walk-in for evaluation of chest pain and shortness of breath.    Patient is 38 weeks pregnant and started developing increased shortness of breath and fatigue. On 11/13, she felt generally malaise with a nonproductive cough, fever, and congestion. This resolved on Thursday (11/17). The past few days, she then began feeling short of breath, developed a sore throat, and experienced \"sharp\" chest pain. She has a history of asthma which is controlled with albuterol at home, but this time has worsened during her pregnancy. " She saw her obgyn and was given 40 mg prednisone, which improved her symptoms then. She notes that prednisone alleviates her asthma when it gets severe. Patient is on day 4 out of 5 of prednisone, and reports that her symptoms has not gone away, which prompted to the ED. Her last breathing treatment was an hour a 1/2 ago. She notes that chest pain is intermittent. Denies any fevers today. Denies lower leg swelling, any other symptoms.       REVIEW OF SYSTEMS   Review of Systems   Constitutional: Positive for fatigue (generally malaise). Negative for fever.   HENT: Positive for sore throat.    Respiratory: Positive for shortness of breath and wheezing.    Cardiovascular: Positive for chest pain. Negative for leg swelling.   All other systems reviewed and are negative.      PAST MEDICAL HISTORY:  Past Medical History:   Diagnosis Date     Asthma        PAST SURGICAL HISTORY:  Past Surgical History:   Procedure Laterality Date     HEMILAMINECTOMY, DISCECTOMY LUMBAR ONE LEVEL, COMBINED N/A 9/17/2022    Procedure: HEMILAMINECTOMY, SPINE, LUMBAR, 1 LEVEL, WITH DISCECTOMY;  Surgeon: Barrie Owen MD;  Location: [facility] OR     LAMINECTOMY LUMBAR ONE LEVEL N/A 9/17/2022    Procedure: LAMINECTOMY, SPINE, LUMBAR, 1 LEVEL;  Surgeon: Barrie Owen MD;  Location: [facility] OR           CURRENT MEDICATIONS:    albuterol (PROVENTIL) (2.5 MG/3ML) 0.083% neb solution  Respiratory Therapy Supplies (NEBULIZER) SANDRA  acetaminophen (TYLENOL) 325 MG tablet  albuterol (PROAIR HFA/PROVENTIL HFA/VENTOLIN HFA) 108 (90 Base) MCG/ACT inhaler  albuterol (PROAIR HFA/PROVENTIL HFA/VENTOLIN HFA) 108 (90 Base) MCG/ACT inhaler  doxylamine (UNISOM) 25 MG TABS tablet  Doxylamine Succinate, Sleep, (UNISOM PO)  gabapentin (NEURONTIN) 100 MG capsule  Lidocaine (LIDOCARE) 4 % Patch  ondansetron (ZOFRAN ODT) 4 MG ODT tab  oxyCODONE (ROXICODONE) 5 MG tablet  predniSONE (DELTASONE) 20 MG tablet  Prenatal Vit-Fe Fumarate-FA (PRENATAL MULTIVITAMIN   PLUS IRON) 27-1 MG TABS  Prenatal Vit-Fe Fumarate-FA (PRENATAL PO)  Pyridoxine HCl (B-6 PO)  sertraline (ZOLOFT) 25 MG tablet  sertraline (ZOLOFT) 25 MG tablet  vitamin B6 (PYRIDOXINE) 100 MG tablet        ALLERGIES:  Allergies   Allergen Reactions     Aspirin Difficulty breathing, Hives, Itching, Nausea, Nausea and Vomiting and Shortness Of Breath     Ibuprofen Difficulty breathing, Fatigue, Headache, Itching, Nausea, Nausea and Vomiting and Shortness Of Breath     Aspirin      Ibuprofen        FAMILY HISTORY:  Family History   Problem Relation Age of Onset     Depression Mother      Obesity Mother      Anxiety Disorder Mother      No Known Problems Father      Depression Sister      Anxiety Disorder Sister      Asthma Sister      Anxiety Disorder Maternal Grandmother      Depression Maternal Grandmother      Obesity Maternal Grandmother      No Known Problems Maternal Grandfather      No Known Problems Paternal Grandmother      No Known Problems Paternal Grandfather        SOCIAL HISTORY:   Social History     Socioeconomic History     Marital status:      Spouse name: None     Number of children: None     Years of education: None     Highest education level: None   Tobacco Use     Smoking status: Never     Smokeless tobacco: Never   Vaping Use     Vaping Use: Never used   Substance and Sexual Activity     Alcohol use: Not Currently     Drug use: Never     Sexual activity: Yes     Partners: Female   Social History Narrative    ** Merged History Encounter **          , No children  Nilton Pantoja MD           VITALS:  /73   Pulse 110   Temp 96.9  F (36.1  C) (Temporal)   Resp 26   Ht 1.829 m (6')   Wt 120.7 kg (266 lb)   LMP 02/23/2022   SpO2 94%   BMI 36.08 kg/m      PHYSICAL EXAM    PHYSICAL EXAM    Constitutional: Well developed, Well nourished, NAD  HENT: Normocephalic, Atraumatic, Bilateral external ears normal, Oropharynx normal, mucous membranes moist, Nose normal. Neck-  Normal  range of motion, No tenderness, Supple, No stridor.   Eyes: PERRL, EOMI, Conjunctiva normal, No discharge.   Respiratory: Normal breath sounds, No respiratory distress, some faint mild wheezing appreciated posteriorly, Speaks full sentences easily. No cough.   Cardiovascular: Normal heart rate, Regular rhythm, No murmurs Chest wall nontender.    GI: Gravid uterus nontender positive fetal movement  : No cva tenderness    Musculoskeletal: 2+ DP pulses. No edema. No cyanosis. Good range of motion in all major joints. No tenderness to palpation. No tenderness of the CTLS spine.   Integument: Warm, Dry, No erythema, No rash. No petechiae.   Neurologic: Alert & oriented x 3, Normal motor function, Normal sensory function, No focal deficits noted.   Psychiatric: Affect normal, Judgment normal, Mood normal. Cooperative.        LAB:  All pertinent labs reviewed and interpreted.  Results for orders placed or performed during the hospital encounter of 11/19/22   US Lower Extremity Venous Duplex Bilateral    Impression    IMPRESSION:  No deep venous thrombosis in the bilateral lower extremities.   Symptomatic; Unknown Influenza A/B & SARS-CoV2 (COVID-19) Virus PCR Multiplex Nasopharyngeal    Specimen: Nasopharyngeal; Swab   Result Value Ref Range    Influenza A PCR Positive (A) Negative    Influenza B PCR Negative Negative    RSV PCR Negative Negative    SARS CoV2 PCR Negative Negative   Result Value Ref Range    Troponin I 0.02 0.00 - 0.29 ng/mL   Basic metabolic panel   Result Value Ref Range    Sodium 137 136 - 145 mmol/L    Potassium 4.2 3.5 - 5.0 mmol/L    Chloride 107 98 - 107 mmol/L    Carbon Dioxide (CO2) 17 (L) 22 - 31 mmol/L    Anion Gap 13 5 - 18 mmol/L    Urea Nitrogen 7 (L) 8 - 22 mg/dL    Creatinine 0.64 0.60 - 1.10 mg/dL    Calcium 9.4 8.5 - 10.5 mg/dL    Glucose 121 70 - 125 mg/dL    GFR Estimate >90 >60 mL/min/1.73m2   D dimer quantitative   Result Value Ref Range    D-Dimer Quantitative 1.20 (H) 0.00 - 0.50  ug/mL FEU   CBC with platelets and differential   Result Value Ref Range    WBC Count 12.7 (H) 4.0 - 11.0 10e3/uL    RBC Count 4.49 3.80 - 5.20 10e6/uL    Hemoglobin 12.4 11.7 - 15.7 g/dL    Hematocrit 36.8 35.0 - 47.0 %    MCV 82 78 - 100 fL    MCH 27.6 26.5 - 33.0 pg    MCHC 33.7 31.5 - 36.5 g/dL    RDW 13.9 10.0 - 15.0 %    Platelet Count 215 150 - 450 10e3/uL    % Neutrophils 89 %    % Lymphocytes 6 %    % Monocytes 4 %    % Eosinophils 0 %    % Basophils 0 %    % Immature Granulocytes 1 %    NRBCs per 100 WBC 0 <1 /100    Absolute Neutrophils 11.3 (H) 1.6 - 8.3 10e3/uL    Absolute Lymphocytes 0.8 0.8 - 5.3 10e3/uL    Absolute Monocytes 0.5 0.0 - 1.3 10e3/uL    Absolute Eosinophils 0.0 0.0 - 0.7 10e3/uL    Absolute Basophils 0.0 0.0 - 0.2 10e3/uL    Absolute Immature Granulocytes 0.1 <=0.4 10e3/uL    Absolute NRBCs 0.0 10e3/uL   ECG 12-LEAD WITH MUSE ([facility])   Result Value Ref Range    Systolic Blood Pressure  mmHg    Diastolic Blood Pressure  mmHg    Ventricular Rate 94 BPM    Atrial Rate 94 BPM    NH Interval 124 ms    QRS Duration 80 ms     ms    QTc 445 ms    P Axis 57 degrees    R AXIS 66 degrees    T Axis 45 degrees    Interpretation ECG       Sinus rhythm  Possible Left atrial enlargement  Borderline ECG  No previous ECGs available  Confirmed by SEE ED PROVIDER NOTE FOR, ECG INTERPRETATION ([id number]),  Chato Blackmon ([id number]) on 11/19/2022 7:37:24 PM         RADIOLOGY:  Reviewed all pertinent imaging. Please see official radiology report.  US Lower Extremity Venous Duplex Bilateral   Final Result   IMPRESSION:   No deep venous thrombosis in the bilateral lower extremities.          EKG:    Performed at: 1931  Sinus rhythm  ST segments per nonischemic  No ectopy  No previous ECG for comparison  Intervals normal  Otherwise unremarkable  Clinical impression no acute changes    I, Carol Gallegos, am serving as a scribe to document services personally performed by Carol Gallegos based on my observation and  the provider's statements to me. I, Keyshawn Rodriguez MD, attest that Carol Gallegos is acting in a scribe capacity, has observed my performance of the services and has documented them in accordance with my direction.    Keyshawn Rodriguez MD  [facility] EMERGENCY ROOM  [address] [address]  [phone]     Keyshawn Rodriguez MD  11/20/22 0011

## 2022-11-21 NOTE — TELEPHONE ENCOUNTER
Spoke with patient on the phone and she feels like she is improving. BP high in ER and recommendations for recheck. Offered patient nurse only visit and she declined. Noted problems with sciatica exacerbation with coughing.   Office visit scheduled with primary on Wednesday.   Routed to PCP. Caterina Coronado RN on 11/21/2022 at 9:37 AM

## 2022-11-22 NOTE — PATIENT INSTRUCTIONS
Phone Numbers:  [facility]: [phone]  [facility]: [phone]     When to call or come in to the hospital  If you notice a decrease in your baby's movement.   If your begin to experience contractions that are 5 minutes or less apart and lasting for over 45 seconds, or if contractions are becoming more painful.  If you have any bleeding or leakage of fluids.   If you have a headache not resolved with tylenol, right upper abdominal pain, or sudden onset of swelling.  You know your body best. Never hesitate to call or go to the hospital if something doesn't feel right!    Preparing for the hospital  You re likely feeling anxious as your child s birth approaches. This is normal. To give yourself some peace of mind, pack a bag 3-4 weeks before your due date. Here is a list of things to remember:  Personal care items like toothbrush, hair brush, lip balm, lotion, shampoo, glasses, contacts  Nightgown, bathrobe, slippers  Several pairs of underwear  Comfortable clothes for you to wear home  Camera with new batteries  Cell phone and   Insurance information and any other paperwork needed for your hospital stay  Clothes for baby to wear home  An infant, rear-facing car seat for bringing home your baby (this is required by law)

## 2022-11-22 NOTE — PROGRESS NOTES
Clinic Note - Return OB Visit    Ailin Calhoun is a 27 year old  female who presents to clinic for a follow up OB visit. She is currently 39w0d with an estimated date of delivery of 2022 via LMP c/w 1st tri US. She denies headache, chest pain, shortness of breath, abdominal pain/contractions, vaginal bleeding, or abnormal discharge. She has felt baby move.     New concerns today:   -recent influenza - doing better, lingering cough  -feeling more pressure over past few days - some mucus plug type material - intermittent cramping    OB History    Para Term  AB Living   1 0 0 0 0 0   SAB IAB Ectopic Multiple Live Births   0 0 0 0 0      # Outcome Date GA Lbr Jayesh/2nd Weight Sex Delivery Anes PTL Lv   1 Current                Physical exam:  /70   Pulse 98   Temp 97  F (36.1  C)   Wt 119.9 kg (264 lb 7 oz)   LMP 2022   SpO2 100%   BMI 35.86 kg/m      General: alert, female in no acute distress  Abdomen: gravid uterus at 37 cm, non-tender, FHTs 135  Cervix: baby feels low though cervix still currently closed and posterior  Extremities: no edema    Encounter for supervision of normal first pregnancy in second trimester  Obesity affecting pregnancy, antepartum  G1 at 39+0 weeks today. Pregnancy complicated by obesity (pregravid BMI 32.08), depression (on selective serotonin reuptake inhibitor), asthma (recent exacerbation d/t influenza). Baby measuring large on US (98%ile at 32 weeks > 90% at 37 weeks).       Reviewed pre-verena labs: O pos, invitae nml, baby boy    Follow up in 1 week for routine prenatal visit     Lainey Tillman MD  [facility]

## 2022-11-23 ENCOUNTER — PRENATAL OFFICE VISIT (OUTPATIENT)
Dept: FAMILY MEDICINE | Facility: CLINIC | Age: 27
End: 2022-11-23
Payer: COMMERCIAL

## 2022-11-23 VITALS
WEIGHT: 264.44 LBS | OXYGEN SATURATION: 100 % | SYSTOLIC BLOOD PRESSURE: 110 MMHG | TEMPERATURE: 97 F | HEART RATE: 98 BPM | DIASTOLIC BLOOD PRESSURE: 70 MMHG | BODY MASS INDEX: 35.86 KG/M2

## 2022-11-23 DIAGNOSIS — Z34.00 SUPERVISION OF NORMAL FIRST PREGNANCY, ANTEPARTUM: Primary | ICD-10-CM

## 2022-11-23 DIAGNOSIS — O99.210 OBESITY AFFECTING PREGNANCY, ANTEPARTUM: ICD-10-CM

## 2022-11-23 PROCEDURE — 99207 PR PRENATAL VISIT: CPT | Performed by: FAMILY MEDICINE

## 2022-11-23 ASSESSMENT — PAIN SCALES - GENERAL: PAINLEVEL: NO PAIN (0)

## 2022-11-28 NOTE — PROGRESS NOTES
Clinic Note - Return OB Visit    Ailin Calhoun is a 27 year old  female who presents to clinic for a follow up OB visit. She is currently 40w0d with an estimated date of delivery of 2022 via LMP c/w 1st tri US. She denies headache, chest pain, shortness of breath, abdominal pain/contractions, vaginal bleeding, or abnormal discharge. She has felt baby move.     New concerns today:   -ready to be done, trying to get labor started  -some more mucus plug, small amount    OB History    Para Term  AB Living   1 0 0 0 0 0   SAB IAB Ectopic Multiple Live Births   0 0 0 0 0      # Outcome Date GA Lbr Jayesh/2nd Weight Sex Delivery Anes PTL Lv   1 Current                Physical exam:  /80   Pulse 83   Temp 97.3  F (36.3  C)   Wt 121.3 kg (267 lb 8 oz)   LMP 2022   SpO2 100%   BMI 36.28 kg/m      General: alert, female in no acute distress  Abdomen: gravid uterus at 37 cm, non-tender, FHTs 135  Cervix: posterior - suspect closed though could barely reach anterior lip  Extremities: no edema    Encounter for supervision of normal first pregnancy in second trimester  Obesity affecting pregnancy, antepartum  G1 at 40+0 weeks today. Pregnancy complicated by obesity (pregravid BMI 32.08), depression (on selective serotonin reuptake inhibitor), asthma (recent exacerbation d/t influenza). Baby measuring large on US (98%ile at 32 weeks > 90% at 37 weeks).  -given maternal obesity and EFW 90%ile on last ultrasound will call [facility] about scheduling induction on Friday, will need cervical ripening     Reviewed pre-verena labs: O pos, invitae nml, baby boy    Lainey Tillman MD  [facility]

## 2022-11-30 ENCOUNTER — PRENATAL OFFICE VISIT (OUTPATIENT)
Dept: FAMILY MEDICINE | Facility: CLINIC | Age: 27
End: 2022-11-30
Payer: COMMERCIAL

## 2022-11-30 ENCOUNTER — MYC MEDICAL ADVICE (OUTPATIENT)
Dept: FAMILY MEDICINE | Facility: CLINIC | Age: 27
End: 2022-11-30

## 2022-11-30 VITALS
BODY MASS INDEX: 36.28 KG/M2 | HEART RATE: 83 BPM | WEIGHT: 267.5 LBS | SYSTOLIC BLOOD PRESSURE: 120 MMHG | DIASTOLIC BLOOD PRESSURE: 80 MMHG | TEMPERATURE: 97.3 F | OXYGEN SATURATION: 100 %

## 2022-11-30 DIAGNOSIS — Z34.00 SUPERVISION OF NORMAL FIRST PREGNANCY, ANTEPARTUM: Primary | ICD-10-CM

## 2022-11-30 DIAGNOSIS — O99.210 OBESITY AFFECTING PREGNANCY, ANTEPARTUM: ICD-10-CM

## 2022-11-30 LAB — SARS-COV-2 RNA RESP QL NAA+PROBE: NEGATIVE

## 2022-11-30 PROCEDURE — U0003 INFECTIOUS AGENT DETECTION BY NUCLEIC ACID (DNA OR RNA); SEVERE ACUTE RESPIRATORY SYNDROME CORONAVIRUS 2 (SARS-COV-2) (CORONAVIRUS DISEASE [COVID-19]), AMPLIFIED PROBE TECHNIQUE, MAKING USE OF HIGH THROUGHPUT TECHNOLOGIES AS DESCRIBED BY CMS-2020-01-R: HCPCS | Performed by: FAMILY MEDICINE

## 2022-11-30 PROCEDURE — U0005 INFEC AGEN DETEC AMPLI PROBE: HCPCS | Performed by: FAMILY MEDICINE

## 2022-11-30 PROCEDURE — 99207 PR PRENATAL VISIT: CPT | Performed by: FAMILY MEDICINE

## 2022-11-30 ASSESSMENT — ASTHMA QUESTIONNAIRES
ACT_TOTALSCORE: 13
QUESTION_3 LAST FOUR WEEKS HOW OFTEN DID YOUR ASTHMA SYMPTOMS (WHEEZING, COUGHING, SHORTNESS OF BREATH, CHEST TIGHTNESS OR PAIN) WAKE YOU UP AT NIGHT OR EARLIER THAN USUAL IN THE MORNING: TWO OR THREE NIGHTS A WEEK
QUESTION_1 LAST FOUR WEEKS HOW MUCH OF THE TIME DID YOUR ASTHMA KEEP YOU FROM GETTING AS MUCH DONE AT WORK, SCHOOL OR AT HOME: SOME OF THE TIME
ACT_TOTALSCORE: 13
QUESTION_4 LAST FOUR WEEKS HOW OFTEN HAVE YOU USED YOUR RESCUE INHALER OR NEBULIZER MEDICATION (SUCH AS ALBUTEROL): ONE OR TWO TIMES PER DAY
ACUTE_EXACERBATION_TODAY: NO
QUESTION_2 LAST FOUR WEEKS HOW OFTEN HAVE YOU HAD SHORTNESS OF BREATH: THREE TO SIX TIMES A WEEK
QUESTION_5 LAST FOUR WEEKS HOW WOULD YOU RATE YOUR ASTHMA CONTROL: SOMEWHAT CONTROLLED

## 2022-11-30 ASSESSMENT — PAIN SCALES - GENERAL: PAINLEVEL: NO PAIN (0)

## 2022-12-02 ENCOUNTER — HOSPITAL ENCOUNTER (INPATIENT)
Facility: CLINIC | Age: 27
LOS: 3 days | Discharge: HOME HEALTH | End: 2022-12-05
Attending: FAMILY MEDICINE | Admitting: FAMILY MEDICINE
Payer: COMMERCIAL

## 2022-12-02 DIAGNOSIS — J45.30 MILD PERSISTENT ASTHMA WITHOUT COMPLICATION: ICD-10-CM

## 2022-12-02 PROBLEM — Z34.90 ENCOUNTER FOR INDUCTION OF LABOR: Status: ACTIVE | Noted: 2022-12-02

## 2022-12-02 LAB
ABO/RH(D): NORMAL
ANTIBODY SCREEN: NEGATIVE
BASOPHILS # BLD AUTO: 0 10E3/UL (ref 0–0.2)
BASOPHILS NFR BLD AUTO: 0 %
EOSINOPHIL # BLD AUTO: 0.2 10E3/UL (ref 0–0.7)
EOSINOPHIL NFR BLD AUTO: 2 %
ERYTHROCYTE [DISTWIDTH] IN BLOOD BY AUTOMATED COUNT: 13.8 % (ref 10–15)
HCT VFR BLD AUTO: 36.4 % (ref 35–47)
HGB BLD-MCNC: 11.9 G/DL (ref 11.7–15.7)
IMM GRANULOCYTES # BLD: 0.1 10E3/UL
IMM GRANULOCYTES NFR BLD: 1 %
LYMPHOCYTES # BLD AUTO: 1.8 10E3/UL (ref 0.8–5.3)
LYMPHOCYTES NFR BLD AUTO: 16 %
MCH RBC QN AUTO: 27.9 PG (ref 26.5–33)
MCHC RBC AUTO-ENTMCNC: 32.7 G/DL (ref 31.5–36.5)
MCV RBC AUTO: 85 FL (ref 78–100)
MONOCYTES # BLD AUTO: 0.8 10E3/UL (ref 0–1.3)
MONOCYTES NFR BLD AUTO: 8 %
NEUTROPHILS # BLD AUTO: 8.1 10E3/UL (ref 1.6–8.3)
NEUTROPHILS NFR BLD AUTO: 73 %
NRBC # BLD AUTO: 0 10E3/UL
NRBC BLD AUTO-RTO: 0 /100
PLATELET # BLD AUTO: 240 10E3/UL (ref 150–450)
RBC # BLD AUTO: 4.27 10E6/UL (ref 3.8–5.2)
SPECIMEN EXPIRATION DATE: NORMAL
T PALLIDUM AB SER QL: NONREACTIVE
WBC # BLD AUTO: 11.1 10E3/UL (ref 4–11)

## 2022-12-02 PROCEDURE — 99221 1ST HOSP IP/OBS SF/LOW 40: CPT | Performed by: FAMILY MEDICINE

## 2022-12-02 PROCEDURE — 250N000013 HC RX MED GY IP 250 OP 250 PS 637: Performed by: FAMILY MEDICINE

## 2022-12-02 PROCEDURE — 85025 COMPLETE CBC W/AUTO DIFF WBC: CPT | Performed by: FAMILY MEDICINE

## 2022-12-02 PROCEDURE — 86901 BLOOD TYPING SEROLOGIC RH(D): CPT | Performed by: FAMILY MEDICINE

## 2022-12-02 PROCEDURE — 86780 TREPONEMA PALLIDUM: CPT | Performed by: FAMILY MEDICINE

## 2022-12-02 PROCEDURE — 120N000001 HC R&B MED SURG/OB

## 2022-12-02 PROCEDURE — 36415 COLL VENOUS BLD VENIPUNCTURE: CPT | Performed by: FAMILY MEDICINE

## 2022-12-02 RX ORDER — ACETAMINOPHEN 325 MG/1
650 TABLET ORAL EVERY 4 HOURS PRN
Status: DISCONTINUED | OUTPATIENT
Start: 2022-12-02 | End: 2022-12-04 | Stop reason: HOSPADM

## 2022-12-02 RX ORDER — MORPHINE SULFATE 10 MG/ML
10 INJECTION, SOLUTION INTRAMUSCULAR; INTRAVENOUS
Status: COMPLETED | OUTPATIENT
Start: 2022-12-02 | End: 2022-12-03

## 2022-12-02 RX ORDER — HYDROXYZINE HYDROCHLORIDE 25 MG/1
50 TABLET, FILM COATED ORAL
Status: DISCONTINUED | OUTPATIENT
Start: 2022-12-02 | End: 2022-12-04 | Stop reason: HOSPADM

## 2022-12-02 RX ORDER — PROCHLORPERAZINE 25 MG
25 SUPPOSITORY, RECTAL RECTAL EVERY 12 HOURS PRN
Status: DISCONTINUED | OUTPATIENT
Start: 2022-12-02 | End: 2022-12-04 | Stop reason: HOSPADM

## 2022-12-02 RX ORDER — MAGNESIUM HYDROXIDE/ALUMINUM HYDROXICE/SIMETHICONE 120; 1200; 1200 MG/30ML; MG/30ML; MG/30ML
30 SUSPENSION ORAL
Status: DISCONTINUED | OUTPATIENT
Start: 2022-12-02 | End: 2022-12-04 | Stop reason: HOSPADM

## 2022-12-02 RX ORDER — TERBUTALINE SULFATE 1 MG/ML
0.25 INJECTION, SOLUTION SUBCUTANEOUS
Status: DISCONTINUED | OUTPATIENT
Start: 2022-12-02 | End: 2022-12-04 | Stop reason: HOSPADM

## 2022-12-02 RX ORDER — METOCLOPRAMIDE 10 MG/1
10 TABLET ORAL EVERY 6 HOURS PRN
Status: DISCONTINUED | OUTPATIENT
Start: 2022-12-02 | End: 2022-12-04 | Stop reason: HOSPADM

## 2022-12-02 RX ORDER — MISOPROSTOL 100 UG/1
25 TABLET ORAL
Status: COMPLETED | OUTPATIENT
Start: 2022-12-02 | End: 2022-12-03

## 2022-12-02 RX ORDER — PROCHLORPERAZINE MALEATE 10 MG
10 TABLET ORAL EVERY 6 HOURS PRN
Status: DISCONTINUED | OUTPATIENT
Start: 2022-12-02 | End: 2022-12-04 | Stop reason: HOSPADM

## 2022-12-02 RX ORDER — ONDANSETRON 2 MG/ML
4 INJECTION INTRAMUSCULAR; INTRAVENOUS EVERY 6 HOURS PRN
Status: DISCONTINUED | OUTPATIENT
Start: 2022-12-02 | End: 2022-12-04 | Stop reason: HOSPADM

## 2022-12-02 RX ORDER — METOCLOPRAMIDE HYDROCHLORIDE 5 MG/ML
10 INJECTION INTRAMUSCULAR; INTRAVENOUS EVERY 6 HOURS PRN
Status: DISCONTINUED | OUTPATIENT
Start: 2022-12-02 | End: 2022-12-04 | Stop reason: HOSPADM

## 2022-12-02 RX ORDER — ONDANSETRON 4 MG/1
4 TABLET, ORALLY DISINTEGRATING ORAL EVERY 6 HOURS PRN
Status: DISCONTINUED | OUTPATIENT
Start: 2022-12-02 | End: 2022-12-04 | Stop reason: HOSPADM

## 2022-12-02 RX ADMIN — MISOPROSTOL 25 MCG: 100 TABLET ORAL at 22:30

## 2022-12-02 RX ADMIN — MISOPROSTOL 25 MCG: 100 TABLET ORAL at 19:48

## 2022-12-02 RX ADMIN — MISOPROSTOL 25 MCG: 100 TABLET ORAL at 17:36

## 2022-12-02 RX ADMIN — MISOPROSTOL 25 MCG: 100 TABLET ORAL at 09:32

## 2022-12-02 RX ADMIN — MISOPROSTOL 25 MCG: 100 TABLET ORAL at 11:37

## 2022-12-02 RX ADMIN — MISOPROSTOL 25 MCG: 100 TABLET ORAL at 13:34

## 2022-12-02 RX ADMIN — MISOPROSTOL 25 MCG: 100 TABLET ORAL at 15:36

## 2022-12-02 ASSESSMENT — ACTIVITIES OF DAILY LIVING (ADL)
ADLS_ACUITY_SCORE: 18
ADLS_ACUITY_SCORE: 18
WEAR_GLASSES_OR_BLIND: NO
ADLS_ACUITY_SCORE: 18
DRESSING/BATHING_DIFFICULTY: NO
ADLS_ACUITY_SCORE: 18
DOING_ERRANDS_INDEPENDENTLY_DIFFICULTY: NO
CHANGE_IN_FUNCTIONAL_STATUS_SINCE_ONSET_OF_CURRENT_ILLNESS/INJURY: NO
CONCENTRATING,_REMEMBERING_OR_MAKING_DECISIONS_DIFFICULTY: NO
DIFFICULTY_EATING/SWALLOWING: NO
ADLS_ACUITY_SCORE: 18
TOILETING_ISSUES: NO
FALL_HISTORY_WITHIN_LAST_SIX_MONTHS: NO
ADLS_ACUITY_SCORE: 18
WALKING_OR_CLIMBING_STAIRS_DIFFICULTY: NO

## 2022-12-02 NOTE — PROGRESS NOTES
Spoke with Dr Tillman, she will put orders in for Cytotec. Okay to wait to start an IV until patient is closer to being in ative labor.

## 2022-12-02 NOTE — H&P
OB Admission H&P  Ailin Calhoun,  1995, MRN [id number]    CC: Encounter for induction of labor    HPI: Ailin Calhoun is a 27 year old year old  at 40w2d weeks by LMP c/w 1st tri  presenting for induction of labor for maternal obesity, EFW 90%ile, postdates. She denies contractions, leakage of fluid, and vaginal bleeding; she reports good fetal movement.    Pregnancy complicated by: obesity, fetal EFW 90%ile, depression on selective serotonin reuptake inhibitor, asthma    ROS: She denies fevers, chills, chest pain, shortness of breath, nausea, vomiting, or dysuria    OB History  OB History    Para Term  AB Living   1 0 0 0 0 0   SAB IAB Ectopic Multiple Live Births   0 0 0 0 0      # Outcome Date GA Lbr Jayesh/2nd Weight Sex Delivery Anes PTL Lv   1 Current                Medical History:  Past Medical History:   Diagnosis Date     Asthma      Depressive disorder      Surgical History:   has a past surgical history that includes Hemilaminectomy, discectomy lumbar one level, combined (N/A, 2022) and Laminectomy lumbar one level (N/A, 2022).    Social History  Reviewed, and she  reports that she has never smoked. She has never used smokeless tobacco. She reports that she does not currently use alcohol. She reports that she does not use drugs.  Denies alcoholol, tobacco, and illicit drugs    Family History:    Allergies   Allergen Reactions     Aspirin Difficulty breathing, Hives, Itching, Nausea, Nausea and Vomiting and Shortness Of Breath     Ibuprofen Difficulty breathing, Fatigue, Headache, Itching, Nausea, Nausea and Vomiting and Shortness Of Breath     Aspirin      Ibuprofen        Medications Prior to Admission   Medication Sig Dispense Refill Last Dose     acetaminophen (TYLENOL) 325 MG tablet Take 2 tablets (650 mg) by mouth every 6 hours as needed for mild pain   2022     albuterol (PROAIR HFA/PROVENTIL HFA/VENTOLIN HFA) 108 (90 Base) MCG/ACT inhaler Inhale 2  puffs into the lungs every 6 hours (Patient taking differently: Inhale 2 puffs into the lungs every 6 hours as needed) 18 g 3 12/2/2022 at [facility]     albuterol (PROVENTIL) (2.5 MG/3ML) 0.083% neb solution Take 2 vials (5 mg) by nebulization every 6 hours as needed for shortness of breath / dyspnea or wheezing 75 mL 0 Past Month     doxylamine (UNISOM) 25 MG TABS tablet Take 25 mg by mouth nightly as needed for sleep   12/1/2022     ondansetron (ZOFRAN ODT) 4 MG ODT tab DISSOLVE 1 TABLET(4 MG) ON THE TONGUE EVERY 8 HOURS AS NEEDED FOR NAUSEA OR VOMITING 30 tablet 1 12/1/2022     Prenatal Vit-Fe Fumarate-FA (PRENATAL MULTIVITAMIN  PLUS IRON) 27-1 MG TABS Take 1 tablet by mouth daily   12/1/2022     sertraline (ZOLOFT) 25 MG tablet Take 1 tablet (25 mg) by mouth daily 90 tablet 3 12/1/2022     vitamin B6 (PYRIDOXINE) 100 MG tablet Take 100 mg by mouth daily   12/1/2022     Respiratory Therapy Supplies (NEBULIZER) SANDRA Take 1 Device by mouth every 6 hours as needed (sob cough) 1 each 0        Physical Exam:  Temp:  [97.6  F (36.4  C)-98.1  F (36.7  C)] 97.6  F (36.4  C)  Pulse:  [82] 82  Resp:  [16] 16  BP: (120-127)/(67-86) 120/86    /86   Pulse 82   Temp 97.6  F (36.4  C) (Oral)   Resp 16   Ht 1.829 m (6')   Wt 121.1 kg (267 lb)   LMP 02/23/2022   BMI 36.21 kg/m      Gen: no acute distress, resting comfortably   CV: acyanotic   Pulm: unlabored respirations   Abd: gravid, soft, nontender   Extremities: soft, nontender    Cervical Exam: closed, posterior  FHR: 120, mod kennedi, +accels, -decels  Hagan:  Irregular     Pertinent Labs  Admission on 12/02/2022   Component Date Value Ref Range Status     WBC Count 12/02/2022 11.1 (H)  4.0 - 11.0 10e3/uL Final     RBC Count 12/02/2022 4.27  3.80 - 5.20 10e6/uL Final     Hemoglobin 12/02/2022 11.9  11.7 - 15.7 g/dL Final     Hematocrit 12/02/2022 36.4  35.0 - 47.0 % Final     MCV 12/02/2022 85  78 - 100 fL Final     MCH 12/02/2022 27.9  26.5 - 33.0 pg Final     MCHC  12/02/2022 32.7  31.5 - 36.5 g/dL Final     RDW 12/02/2022 13.8  10.0 - 15.0 % Final     Platelet Count 12/02/2022 240  150 - 450 10e3/uL Final     % Neutrophils 12/02/2022 73  % Final     % Lymphocytes 12/02/2022 16  % Final     % Monocytes 12/02/2022 8  % Final     % Eosinophils 12/02/2022 2  % Final     % Basophils 12/02/2022 0  % Final     % Immature Granulocytes 12/02/2022 1  % Final     NRBCs per 100 WBC 12/02/2022 0  <1 /100 Final     Absolute Neutrophils 12/02/2022 8.1  1.6 - 8.3 10e3/uL Final     Absolute Lymphocytes 12/02/2022 1.8  0.8 - 5.3 10e3/uL Final     Absolute Monocytes 12/02/2022 0.8  0.0 - 1.3 10e3/uL Final     Absolute Eosinophils 12/02/2022 0.2  0.0 - 0.7 10e3/uL Final     Absolute Basophils 12/02/2022 0.0  0.0 - 0.2 10e3/uL Final     Absolute Immature Granulocytes 12/02/2022 0.1  <=0.4 10e3/uL Final     Absolute NRBCs 12/02/2022 0.0  10e3/uL Final     ABO/RH(D) 12/02/2022 O POS   Final     Antibody Screen 12/02/2022 Negative  Negative Final     SPECIMEN EXPIRATION DATE 12/02/2022 [id number]   Final   Prenatal Office Visit on 11/30/2022   Component Date Value Ref Range Status     SARS CoV2 PCR 11/30/2022 Negative  Negative Final    NEGATIVE: SARS-CoV-2 (COVID-19) RNA not detected, presumed negative.   Admission on 11/19/2022, Discharged on 11/19/2022   Component Date Value Ref Range Status     Ventricular Rate 11/19/2022 94  BPM Final     Atrial Rate 11/19/2022 94  BPM Final     NV Interval 11/19/2022 124  ms Final     QRS Duration 11/19/2022 80  ms Final     QT 11/19/2022 356  ms Final     QTc 11/19/2022 445  ms Final     P Axis 11/19/2022 57  degrees Final     R AXIS 11/19/2022 66  degrees Final     T Axis 11/19/2022 45  degrees Final     Interpretation ECG 11/19/2022    Final                    Value:Sinus rhythm  Possible Left atrial enlargement  Borderline ECG  No previous ECGs available  Confirmed by SEE ED PROVIDER NOTE FOR, ECG INTERPRETATION ([id number]),  Chato Blackmon ([id number])  on 11/19/2022 7:37:24 PM       Influenza A PCR 11/19/2022 Positive (A)  Negative Final     Influenza B PCR 11/19/2022 Negative  Negative Final     RSV PCR 11/19/2022 Negative  Negative Final     SARS CoV2 PCR 11/19/2022 Negative  Negative Final    NEGATIVE: SARS-CoV-2 (COVID-19) RNA not detected, presumed negative.     Troponin I 11/19/2022 0.02  0.00 - 0.29 ng/mL Final     Sodium 11/19/2022 137  136 - 145 mmol/L Final     Potassium 11/19/2022 4.2  3.5 - 5.0 mmol/L Final     Chloride 11/19/2022 107  98 - 107 mmol/L Final     Carbon Dioxide (CO2) 11/19/2022 17 (L)  22 - 31 mmol/L Final     Anion Gap 11/19/2022 13  5 - 18 mmol/L Final     Urea Nitrogen 11/19/2022 7 (L)  8 - 22 mg/dL Final     Creatinine 11/19/2022 0.64  0.60 - 1.10 mg/dL Final     Calcium 11/19/2022 9.4  8.5 - 10.5 mg/dL Final     Glucose 11/19/2022 121  70 - 125 mg/dL Final     GFR Estimate 11/19/2022 >90  >60 mL/min/1.73m2 Final    Effective December 21, 2021 eGFRcr in adults is calculated using the 2021 CKD-EPI creatinine equation which includes age and gender (Amandeep turner al., NEJ, DOI: 10.1056/VUACev5329476)     D-Dimer Quantitative 11/19/2022 1.20 (H)  0.00 - 0.50 ug/mL FEU Final     WBC Count 11/19/2022 12.7 (H)  4.0 - 11.0 10e3/uL Final     RBC Count 11/19/2022 4.49  3.80 - 5.20 10e6/uL Final     Hemoglobin 11/19/2022 12.4  11.7 - 15.7 g/dL Final     Hematocrit 11/19/2022 36.8  35.0 - 47.0 % Final     MCV 11/19/2022 82  78 - 100 fL Final     MCH 11/19/2022 27.6  26.5 - 33.0 pg Final     MCHC 11/19/2022 33.7  31.5 - 36.5 g/dL Final     RDW 11/19/2022 13.9  10.0 - 15.0 % Final     Platelet Count 11/19/2022 215  150 - 450 10e3/uL Final     % Neutrophils 11/19/2022 89  % Final     % Lymphocytes 11/19/2022 6  % Final     % Monocytes 11/19/2022 4  % Final     % Eosinophils 11/19/2022 0  % Final     % Basophils 11/19/2022 0  % Final     % Immature Granulocytes 11/19/2022 1  % Final     NRBCs per 100 WBC 11/19/2022 0  <1 /100 Final     Absolute  Neutrophils 11/19/2022 11.3 (H)  1.6 - 8.3 10e3/uL Final     Absolute Lymphocytes 11/19/2022 0.8  0.8 - 5.3 10e3/uL Final     Absolute Monocytes 11/19/2022 0.5  0.0 - 1.3 10e3/uL Final     Absolute Eosinophils 11/19/2022 0.0  0.0 - 0.7 10e3/uL Final     Absolute Basophils 11/19/2022 0.0  0.0 - 0.2 10e3/uL Final     Absolute Immature Granulocytes 11/19/2022 0.1  <=0.4 10e3/uL Final     Absolute NRBCs 11/19/2022 0.0  10e3/uL Final   Prenatal Office Visit on 11/09/2022   Component Date Value Ref Range Status     Group B Strep PCR 11/09/2022 Negative  Negative Final    Presumed negative for Streptococcus agalactiae (Group B Streptococcus) or the number of organisms may be below the limit of detection of the assay.     Hemoglobin 11/09/2022 12.2  11.7 - 15.7 g/dL Final   Admission on 09/16/2022, Discharged on 09/18/2022   Component Date Value Ref Range Status     pH Arterial POCT 09/17/2022 7.38  7.35 - 7.45 Final     pCO2 Arterial POCT 09/17/2022 33 (L)  35 - 45 mm Hg Final     pO2 Arterial POCT 09/17/2022 142 (H)  80 - 105 mm Hg Final     Bicarbonate Arterial POCT 09/17/2022 20 (L)  21 - 28 mmol/L Final     Sodium POCT 09/17/2022 139  133 - 144 mmol/L Final     Potassium POCT 09/17/2022 3.4 (L)  3.5 - 5.0 mmol/L Final     Hemoglobin POCT 09/17/2022 10.7 (L)  11.7 - 15.7 g/dL Final     Glucose Whole Blood POCT 09/17/2022 124 (H)  70 - 99 mg/dL Final     Calcium, Ionized Whole Blood POCT 09/17/2022 4.6  4.4 - 5.2 mg/dL Final     Base Excess/Deficit (+/-) POCT 09/17/2022 -5.0  -9.6 - 2.0 mmol/L Final     FIO2 POCT 09/17/2022 45.0  % Final     O2 Sat, Arterial POCT 09/17/2022 99  92 - 100 % Final     Lactic Acid POCT 09/17/2022 1.3  <=2.0 mmol/L Final     SARS CoV2 PCR 09/16/2022 Negative  Negative Final    NEGATIVE: SARS-CoV-2 (COVID-19) RNA not detected, presumed negative.     INR 09/17/2022 0.99  0.85 - 1.15 Final    Some International Normalized Ratio (INR) results performed at the [facility] Acute Care Lab for  patients 6 months and older reported between 07/11/2021 and 5/17/2022 were evaluated against an outdated reference interval of 0.86-1.14 rather than the intended reference interval of 0.85-1.15. The INR value itself was accurate, but may not have been flagged correctly due to the outdated reference interval.     aPTT 09/17/2022 26  22 - 38 Seconds Final     Sodium 09/17/2022 140  133 - 144 mmol/L Final     Potassium 09/17/2022 3.4  3.4 - 5.3 mmol/L Final     Chloride 09/17/2022 109  94 - 109 mmol/L Final     Carbon Dioxide (CO2) 09/17/2022 24  20 - 32 mmol/L Final     Anion Gap 09/17/2022 7  3 - 14 mmol/L Final     Urea Nitrogen 09/17/2022 7  7 - 30 mg/dL Final     Creatinine 09/17/2022 0.52  0.52 - 1.04 mg/dL Final     Calcium 09/17/2022 9.3  8.5 - 10.1 mg/dL Final     Glucose 09/17/2022 90  70 - 99 mg/dL Final     GFR Estimate 09/17/2022 >90  >60 mL/min/1.73m2 Final    Effective December 21, 2021 eGFRcr in adults is calculated using the 2021 CKD-EPI creatinine equation which includes age and gender (Amandeep et al., NE, DOI: 10.1056/PJDYbi4350027)     ABO/RH(D) 09/17/2022 O POS   Final     Antibody Screen 09/17/2022 Negative  Negative Final     SPECIMEN EXPIRATION DATE 09/17/2022 [id number]   Final     WBC Count 09/17/2022 11.3 (H)  4.0 - 11.0 10e3/uL Final     RBC Count 09/17/2022 3.87  3.80 - 5.20 10e6/uL Final     Hemoglobin 09/17/2022 10.7 (L)  11.7 - 15.7 g/dL Final     Hematocrit 09/17/2022 32.0 (L)  35.0 - 47.0 % Final     MCV 09/17/2022 83  78 - 100 fL Final     MCH 09/17/2022 27.6  26.5 - 33.0 pg Final     MCHC 09/17/2022 33.4  31.5 - 36.5 g/dL Final     RDW 09/17/2022 13.1  10.0 - 15.0 % Final     Platelet Count 09/17/2022 243  150 - 450 10e3/uL Final     % Neutrophils 09/17/2022 68  % Final     % Lymphocytes 09/17/2022 18  % Final     % Monocytes 09/17/2022 8  % Final     % Eosinophils 09/17/2022 4  % Final     % Basophils 09/17/2022 0  % Final     % Immature Granulocytes 09/17/2022 2  % Final      NRBCs per 100 WBC 09/17/2022 0  <1 /100 Final     Absolute Neutrophils 09/17/2022 7.7  1.6 - 8.3 10e3/uL Final     Absolute Lymphocytes 09/17/2022 2.0  0.8 - 5.3 10e3/uL Final     Absolute Monocytes 09/17/2022 0.9  0.0 - 1.3 10e3/uL Final     Absolute Eosinophils 09/17/2022 0.4  0.0 - 0.7 10e3/uL Final     Absolute Basophils 09/17/2022 0.1  0.0 - 0.2 10e3/uL Final     Absolute Immature Granulocytes 09/17/2022 0.2  <=0.4 10e3/uL Final     Absolute NRBCs 09/17/2022 0.0  10e3/uL Final     WBC Count 09/17/2022 14.1 (H)  4.0 - 11.0 10e3/uL Final     RBC Count 09/17/2022 3.76 (L)  3.80 - 5.20 10e6/uL Final     Hemoglobin 09/17/2022 10.4 (L)  11.7 - 15.7 g/dL Final     Hematocrit 09/17/2022 31.0 (L)  35.0 - 47.0 % Final     MCV 09/17/2022 82  78 - 100 fL Final     MCH 09/17/2022 27.7  26.5 - 33.0 pg Final     MCHC 09/17/2022 33.5  31.5 - 36.5 g/dL Final     RDW 09/17/2022 13.0  10.0 - 15.0 % Final     Platelet Count 09/17/2022 220  150 - 450 10e3/uL Final     Sodium 09/17/2022 139  133 - 144 mmol/L Final     Potassium 09/17/2022 4.4  3.4 - 5.3 mmol/L Final     Chloride 09/17/2022 108  94 - 109 mmol/L Final     Carbon Dioxide (CO2) 09/17/2022 25  20 - 32 mmol/L Final     Anion Gap 09/17/2022 6  3 - 14 mmol/L Final     Urea Nitrogen 09/17/2022 3 (L)  7 - 30 mg/dL Final     Creatinine 09/17/2022 0.51 (L)  0.52 - 1.04 mg/dL Final     Calcium 09/17/2022 8.1 (L)  8.5 - 10.1 mg/dL Final     Glucose 09/17/2022 118 (H)  70 - 99 mg/dL Final     GFR Estimate 09/17/2022 >90  >60 mL/min/1.73m2 Final    Effective December 21, 2021 eGFRcr in adults is calculated using the 2021 CKD-EPI creatinine equation which includes age and gender (Amandeep turner al., NE, DOI: 10.1056/OQKWgl9290933)     INR 09/17/2022 0.98  0.85 - 1.15 Final    Some International Normalized Ratio (INR) results performed at the [facility] Care Lab for patients 6 months and older reported between 07/11/2021 and 5/17/2022 were evaluated against an outdated  reference interval of 0.86-1.14 rather than the intended reference interval of 0.85-1.15. The INR value itself was accurate, but may not have been flagged correctly due to the outdated reference interval.     Blood Component Type 09/17/2022 Red Blood Cells   Preliminary     Product Code 09/17/2022 [id number]   Preliminary     Unit Status 09/17/2022 Returned   Preliminary     Unit Number 09/17/2022 [id number]   Preliminary     CROSSMATCH 09/17/2022 Compatible   Preliminary     CODING SYSTEM 09/17/2022 [id number]   Preliminary     ISSUE DATE AND TIME 09/17/2022 [id number]   Preliminary     UNIT ABO/RH 09/17/2022 O+   Preliminary     UNIT TYPE ISBT 09/17/2022 5100   Preliminary     Blood Component Type 09/17/2022 Red Blood Cells   Preliminary     Product Code 09/17/2022 [id number]   Preliminary     Unit Status 09/17/2022 Returned   Preliminary     Unit Number 09/17/2022 [id number]   Preliminary     CROSSMATCH 09/17/2022 Compatible   Preliminary     CODING SYSTEM 09/17/2022 [id number]   Preliminary     ISSUE DATE AND TIME 09/17/2022 20220917080500   Preliminary     UNIT ABO/RH 09/17/2022 O+   Preliminary     UNIT TYPE ISBT 09/17/2022 5100   Preliminary     GLUCOSE BY METER POCT 09/17/2022 129 (H)  70 - 99 mg/dL Final     pH Arterial POCT 09/17/2022 7.37  7.35 - 7.45 Final    DR/RN notified     pCO2 Arterial POCT 09/17/2022 34 (L)  35 - 45 mm Hg Final    DR/RN notified     pO2 Arterial POCT 09/17/2022 121 (H)  80 - 105 mm Hg Final    DR/RN notified     Bicarbonate Arterial POCT 09/17/2022 20 (L)  21 - 28 mmol/L Final    DR/RN notified     Sodium POCT 09/17/2022 138  133 - 144 mmol/L Final    DR/RN notified     Potassium POCT 09/17/2022 3.7  3.5 - 5.0 mmol/L Final    /RN notified     Hemoglobin POCT 09/17/2022 10.5 (L)  11.7 - 15.7 g/dL Final    DR/RN notified     Glucose Whole Blood POCT 09/17/2022 136 (H)  70 - 99 mg/dL Final    DR/RN notified     Calcium, Ionized Whole Blood POCT 09/17/2022 4.6  4.4 - 5.2 mg/dL Final     /RN notified     Base Excess/Deficit (+/-) POCT 09/17/2022 -5.2  -9.6 - 2.0 mmol/L Final    /RN notified     FIO2 POCT 09/17/2022 37.0  % Final     Lactic Acid POCT 09/17/2022 1.3  <=2.0 mmol/L Final    /AHMET notified   Prenatal Office Visit on 08/24/2022   Component Date Value Ref Range Status     Glu Gest Screen 1hr 50g 08/24/2022 106  70 - 129 mg/dL Final     WBC Count 08/24/2022 10.9  4.0 - 11.0 10e3/uL Final     RBC Count 08/24/2022 3.84  3.80 - 5.20 10e6/uL Final     Hemoglobin 08/24/2022 10.7 (L)  11.7 - 15.7 g/dL Final     Hematocrit 08/24/2022 32.0 (L)  35.0 - 47.0 % Final     MCV 08/24/2022 83  78 - 100 fL Final     MCH 08/24/2022 27.9  26.5 - 33.0 pg Final     MCHC 08/24/2022 33.4  31.5 - 36.5 g/dL Final     RDW 08/24/2022 12.9  10.0 - 15.0 % Final     Platelet Count 08/24/2022 238  150 - 450 10e3/uL Final     Treponema Antibody Total 08/24/2022 Nonreactive  Nonreactive Final   Prenatal Office Visit on 05/04/2022   Component Date Value Ref Range Status     See Scanned Result 05/04/2022 INVITAE NON-INVASIVE PRENATAL SCREENING-Scanned   Final     HIV Antigen Antibody Combo 05/04/2022 Negative  Negative Final     Hepatitis C Antibody 05/04/2022 Nonreactive  Nonreactive Final     Hepatitis B Surface Antigen 05/04/2022 Nonreactive  Nonreactive Final     WBC Count 05/04/2022 11.8 (H)  4.0 - 11.0 10e3/uL Final     RBC Count 05/04/2022 4.20  3.80 - 5.20 10e6/uL Final     Hemoglobin 05/04/2022 11.8  11.7 - 15.7 g/dL Final     Hematocrit 05/04/2022 34.5 (L)  35.0 - 47.0 % Final     MCV 05/04/2022 82  78 - 100 fL Final     MCH 05/04/2022 28.1  26.5 - 33.0 pg Final     MCHC 05/04/2022 34.2  31.5 - 36.5 g/dL Final     RDW 05/04/2022 12.5  10.0 - 15.0 % Final     Platelet Count 05/04/2022 311  150 - 450 10e3/uL Final     Rubella Su IgG Instrument Value 05/04/2022 2.93  <0.90 Index Final     Rubella Antibody IgG 05/04/2022 Positive   Final    Suggests previous exposure or immunization and probable immunity.      Treponema Antibody Total 05/04/2022 Nonreactive  Nonreactive Final     Vitamin D, Total (25-Hydroxy) 05/04/2022 27  20 - 75 ug/L Final     Hemoglobin A1C 05/04/2022 5.3  0.0 - 5.6 % Final    Normal <5.7%   Prediabetes 5.7-6.4%    Diabetes 6.5% or higher     Note: Adopted from ADA consensus guidelines.     ABO/RH(D) 05/04/2022 O POS   Final     Antibody Screen 05/04/2022 Negative  Negative Final     SPECIMEN EXPIRATION DATE 05/04/2022 [id number]   Final     Chlamydia trachomatis 05/04/2022 Negative  Negative Final    A negative result by transcription mediated amplification does not preclude the presence of C. trachomatis infection because results are dependent on proper and adequate collection, absence of inhibitors and sufficient rRNA to be detected.     Neisseria gonorrhoeae 05/04/2022 Negative  Negative Final    Negative for N. gonorrhoeae rRNA by transcription mediated amplification. A negative result by transcription mediated amplification does not preclude the presence of C. trachomatis infection because results are dependent on proper and adequate collection, absence of inhibitors and sufficient rRNA to be detected.     Culture 05/04/2022 50,000-100,000 CFU/mL Mixture of urogenital mark anthony   Final     Color Urine 05/04/2022 Yellow  Colorless, Straw, Light Yellow, Yellow Final     Appearance Urine 05/04/2022 Cloudy (A)  Clear Final     Glucose Urine 05/04/2022 Negative  Negative mg/dL Final     Bilirubin Urine 05/04/2022 Negative  Negative Final     Ketones Urine 05/04/2022 Negative  Negative mg/dL Final     Specific Gravity Urine 05/04/2022 1.020  1.005 - 1.030 Final     Blood Urine 05/04/2022 Negative  Negative Final     pH Urine 05/04/2022 8.5 (H)  5.0 - 8.0 Final     Protein Albumin Urine 05/04/2022 30 (A)  Negative mg/dL Final     Urobilinogen Urine 05/04/2022 1.0  0.2, 1.0 E.U./dL Final     Nitrite Urine 05/04/2022 Negative  Negative Final     Leukocyte Esterase Urine 05/04/2022 Trace (A)  Negative  Final     Bacteria Urine 05/04/2022 Many (A)  None Seen /HPF Final     RBC Urine 05/04/2022 0-2  0-2 /HPF /HPF Final     WBC Urine 05/04/2022 0-5  0-5 /HPF /HPF Final     Squamous Epithelials Urine 05/04/2022 Moderate (A)  None Seen /LPF Final     Amorphous Crystals Urine 05/04/2022 Many (A)  None Seen /HPF Final   Office Visit on 03/23/2022   Component Date Value Ref Range Status     Interpretation 03/23/2022 Negative for Intraepithelial Lesion or Malignancy (NILM)    Final     Comment 03/23/2022    Final                    Value:This result contains rich text formatting which cannot be displayed here.     Specimen Adequacy 03/23/2022 Satisfactory for evaluation, endocervical/transformation zone component present   Final     Clinical Information 03/23/2022    Final                    Value:This result contains rich text formatting which cannot be displayed here.     Reflex Testing 03/23/2022 Yes if ASCUS   Final     Previous Abnormal? 03/23/2022    Final                    Value:This result contains rich text formatting which cannot be displayed here.     Previous Abnormal Diagnosis 03/23/2022    Final                    Value:This result contains rich text formatting which cannot be displayed here.     Performing Labs 03/23/2022    Final                    Value:This result contains rich text formatting which cannot be displayed here.     hCG Urine Qualitative 03/23/2022 Negative  Negative Final    This test is for screening purposes.  Results should be interpreted along with the clinical picture.  Confirmation testing is available if warranted by ordering [id number], HCG Quantitative Pregnancy.     Color Urine 03/23/2022 Yellow  Colorless, Straw, Light Yellow, Yellow Final     Appearance Urine 03/23/2022 Clear  Clear Final     Glucose Urine 03/23/2022 Negative  Negative mg/dL Final     Bilirubin Urine 03/23/2022 Negative  Negative Final     Ketones Urine 03/23/2022 Negative  Negative mg/dL Final     Specific Gravity  Urine 03/23/2022 1.010  1.005 - 1.030 Final     Blood Urine 03/23/2022 Negative  Negative Final     pH Urine 03/23/2022 6.5  5.0 - 8.0 Final     Protein Albumin Urine 03/23/2022 Negative  Negative mg/dL Final     Urobilinogen Urine 03/23/2022 0.2  0.2, 1.0 E.U./dL Final     Nitrite Urine 03/23/2022 Negative  Negative Final     Leukocyte Esterase Urine 03/23/2022 Negative  Negative Final     Trichomonas 03/23/2022 Absent  Absent Final     Yeast 03/23/2022 Absent  Absent Final     Clue Cells 03/23/2022 Absent  Absent Final     WBCs/high power field 03/23/2022 2+ (A)  None Final     Glucose 03/23/2022 74  70 - 125 mg/dL Final     Patient Fasting > 8hrs? 03/23/2022 Unknown   Final     Hemoglobin 03/23/2022 12.1  11.7 - 15.7 g/dL Final     Cholesterol 03/23/2022 148  <=199 mg/dL Final     Triglycerides 03/23/2022 78  <=149 mg/dL Final     Direct Measure HDL 03/23/2022 41 (L)  >=50 mg/dL Final    HDL Cholesterol Reference Range:     0-2 years:   No reference ranges established for patients under 2 years old  [facility] analytes.    2-8 years:  Greater than 45 mg/dL     18 years and older:   Female: Greater than or equal to 50 mg/dL   Male:   Greater than or equal to 40 mg/dL     LDL Cholesterol Calculated 03/23/2022 91  <=129 mg/dL Final     Patient Fasting > 8hrs? 03/23/2022 Unknown   Final     hCG Quantitative 03/23/2022 112 (H)  0 - 4 mlU/mL Final    Non-pregnant female . . . . . . . . . . . . . 0-4 (<5) mIU/mL  Equivocal result for early pregnancy . . . . 5-24 mIU/mL  Values in pregnancy should double every 2-3 days for the first 6 weeks. Patients with very low levels of hCG should be resampled and retested after 48 hours.   For diagnostic purposes, hCG results should be used   conjunction with other data.   If the hCG level is inconsistent with clinical evidence,   results should be confirmed by an alternate hCG method.   This assay is approved for use in the early detection   of pregnancy  only. It is not approved for any other   uses such as tumor marker screening or monitoring.     Other HR HPV 2022 Negative  Negative Final     HPV16 DNA 2022 Negative  Negative Final     HPV18 DNA 2022 Negative  Negative Final     FINAL DIAGNOSIS 2022    Final                    Value:This result contains rich text formatting which cannot be displayed here.         Prenatal Labs  Blood type: O pos  GBS neg   Rubella immune, Hep B negative, HIV negative, RPR non-reactive   GC / CT negative   1 hr GCT passed     Impression:  Ailin Calhoun is a 27 year old year old at 40w2d weeks admitted for induction of labor for maternal obesity, EFW 90%ile, postdates; pregnancy complicated by: obesity, fetal EFW 90%ile, depression on selective serotonin reuptake inhibitor, asthma. Fetal status reassuring. Prenatal labs WNL.      Plan:  1. Induction of Labor   Cervical ripening with PO cytotec   Desires epidural    Anticipate       Lainey Tillman MD  [facility]

## 2022-12-02 NOTE — PHARMACY-ADMISSION MEDICATION HISTORY
Pharmacy Note - Admission Medication History    Pertinent Provider Information:      ______________________________________________________________________    Prior To Admission (PTA) med list completed and updated in EMR.       PTA Med List   Medication Sig Last Dose     acetaminophen (TYLENOL) 325 MG tablet Take 2 tablets (650 mg) by mouth every 6 hours as needed for mild pain 12/1/2022     albuterol (PROAIR HFA/PROVENTIL HFA/VENTOLIN HFA) 108 (90 Base) MCG/ACT inhaler Inhale 2 puffs into the lungs every 6 hours (Patient taking differently: Inhale 2 puffs into the lungs every 6 hours as needed) 12/2/2022 at [facility] with     albuterol (PROVENTIL) (2.5 MG/3ML) 0.083% neb solution Take 2 vials (5 mg) by nebulization every 6 hours as needed for shortness of breath / dyspnea or wheezing Past Month     doxylamine (UNISOM) 25 MG TABS tablet Take 25 mg by mouth nightly as needed for sleep 12/1/2022     ondansetron (ZOFRAN ODT) 4 MG ODT tab DISSOLVE 1 TABLET(4 MG) ON THE TONGUE EVERY 8 HOURS AS NEEDED FOR NAUSEA OR VOMITING 12/1/2022     Prenatal Vit-Fe Fumarate-FA (PRENATAL MULTIVITAMIN  PLUS IRON) 27-1 MG TABS Take 1 tablet by mouth daily 12/1/2022     sertraline (ZOLOFT) 25 MG tablet Take 1 tablet (25 mg) by mouth daily 12/1/2022     vitamin B6 (PYRIDOXINE) 100 MG tablet Take 100 mg by mouth daily 12/1/2022       Information source(s): Patient and CareEverywhere/SureScripts  Method of interview communication: phone    Summary of Changes to PTA Med List  New: None  Discontinued: Oxycodone, lidocaine patches, gabapentin  Changed: None    Patient was asked about OTC/herbal products specifically.  PTA med list reflects this.    In the past week, patient estimated taking medication this percent of the time:  greater than 90%.    Allergies were reviewed, assessed, and updated with the patient.      Medications available for use during hospital stay: albuterol inhaler.     The information provided in this note is only as accurate  as the sources available at the time of the update(s).    Thank you for the opportunity to participate in the care of this patient.    Kleber Morales RPH  12/2/2022 9:45 AM

## 2022-12-03 PROCEDURE — 250N000013 HC RX MED GY IP 250 OP 250 PS 637: Performed by: FAMILY MEDICINE

## 2022-12-03 PROCEDURE — 250N000011 HC RX IP 250 OP 636: Performed by: FAMILY MEDICINE

## 2022-12-03 PROCEDURE — 3E0P7VZ INTRODUCTION OF HORMONE INTO FEMALE REPRODUCTIVE, VIA NATURAL OR ARTIFICIAL OPENING: ICD-10-PCS | Performed by: FAMILY MEDICINE

## 2022-12-03 PROCEDURE — 120N000001 HC R&B MED SURG/OB

## 2022-12-03 PROCEDURE — 250N000013 HC RX MED GY IP 250 OP 250 PS 637

## 2022-12-03 RX ORDER — SERTRALINE HYDROCHLORIDE 25 MG/1
25 TABLET, FILM COATED ORAL DAILY
Status: DISCONTINUED | OUTPATIENT
Start: 2022-12-03 | End: 2022-12-05 | Stop reason: HOSPADM

## 2022-12-03 RX ADMIN — MISOPROSTOL 25 MCG: 100 TABLET ORAL at 10:17

## 2022-12-03 RX ADMIN — SERTRALINE HYDROCHLORIDE 25 MG: 25 TABLET, FILM COATED ORAL at 10:15

## 2022-12-03 RX ADMIN — DINOPROSTONE 10 MG: 10 INSERT VAGINAL at 14:02

## 2022-12-03 RX ADMIN — HYDROXYZINE HYDROCHLORIDE 50 MG: 25 TABLET, FILM COATED ORAL at 18:05

## 2022-12-03 RX ADMIN — MISOPROSTOL 25 MCG: 100 TABLET ORAL at 08:17

## 2022-12-03 RX ADMIN — MISOPROSTOL 25 MCG: 100 TABLET ORAL at 00:30

## 2022-12-03 RX ADMIN — MISOPROSTOL 25 MCG: 100 TABLET ORAL at 03:25

## 2022-12-03 RX ADMIN — MISOPROSTOL 25 MCG: 100 TABLET ORAL at 06:14

## 2022-12-03 RX ADMIN — ONDANSETRON 4 MG: 4 TABLET, ORALLY DISINTEGRATING ORAL at 22:05

## 2022-12-03 RX ADMIN — MORPHINE SULFATE 10 MG: 10 INJECTION INTRAVENOUS at 18:05

## 2022-12-03 ASSESSMENT — ACTIVITIES OF DAILY LIVING (ADL)
ADLS_ACUITY_SCORE: 18

## 2022-12-03 NOTE — PLAN OF CARE
Problem: Labor  Goal: Stable Fetal Wellbeing  Outcome: Progressing  Intervention: Promote and Monitor Fetal Wellbeing  Recent Flowsheet Documentation  Taken 12/3/2022 1204 by Lyn Tan RN  Body Position: position changed independently     Problem: Labor  Goal: Effective Progression to Delivery  Outcome: Progressing     Problem: Labor  Goal: Normal Uterine Contraction Pattern  Outcome: Progressing    Pt is vitally stable.  She is feeling some contractions and rates them a 3/10; states they are not distracting and she does not need to breathe through them yet.  Her SVE after last dose of cytotec was 3/60/-1 with a quiroz of 7.  Dr. Tillman is aware and would like to proceed with cervidil after pt walks around the unit for a bit.  Pt is agreeable with plan.

## 2022-12-03 NOTE — PROGRESS NOTES
Repeat cervical check 2 hours after finishing Cytotec course was 3/60%/-1 per RN, with Evans score of 7. Will have patient ambulate and then start Cervidil. Continues to have Cat I tracing and mild contractions q5-7 min, rated 3/10 in intensity.

## 2022-12-03 NOTE — PLAN OF CARE
"Pt starting to feel mild contractions, will request epidural when needed. Pr remains intact with no bleeding. Voiding and ambulating without difficulty.   Problem: Plan of Care - These are the overarching goals to be used throughout the patient stay.    Goal: Plan of Care Review  Description: The Plan of Care Review/Shift note should be completed every shift.  The Outcome Evaluation is a brief statement about your assessment that the patient is improving, declining, or no change.  This information will be displayed automatically on your shift note.  Outcome: Progressing  Goal: Patient-Specific Goal (Individualized)  Description: You can add care plan individualizations to a care plan. Examples of Individualization might be:  \"Parent requests to be called daily at 9am for status\", \"I have a hard time hearing out of my right ear\", or \"Do not touch me to wake me up as it startles me\".  Outcome: Progressing  Goal: Absence of Hospital-Acquired Illness or Injury  Outcome: Progressing  Goal: Optimal Comfort and Wellbeing  Outcome: Progressing  Goal: Readiness for Transition of Care  Outcome: Progressing     Problem: Labor  Goal: Hemostasis  Outcome: Progressing  Goal: Stable Fetal Wellbeing  Outcome: Progressing  Goal: Effective Progression to Delivery  Outcome: Progressing  Goal: Absence of Infection Signs and Symptoms  Outcome: Progressing  Goal: Acceptable Pain Control  Outcome: Progressing  Goal: Normal Uterine Contraction Pattern  Outcome: Progressing     "

## 2022-12-03 NOTE — PROGRESS NOTES
Labor Progress Note    Subjective:  Ailin Calhoun is a 27 year old yo  who was admitted for labor induction for maternal obesity, EFW 90%ile, postdates. Overall pt doing well/comfortable - has gotten 11 doses of PO cytotec so far, having irregular contractions (not really feeling these).     Objective:  Temp:  [97.6  F (36.4  C)-98.7  F (37.1  C)] 98.7  F (37.1  C)  Resp:  [16-18] 18  BP: (113-141)/(56-86) 114/69  SpO2:  [97 %] 97 %    Cervix: closed, posterior    Membranes: are intact    FHT: 135 bpm, moderate variability, +accels, no decls  Contractions: irregular    Impression:  Ailin Calhoun is a 27 year old year old at 40w3d weeks here for induction.     Plan:    Finish PO cytotec course - then check cervix to determine next steps (cervidil vs pitocin)    Encourage ambulation    Lainey Tillman MD

## 2022-12-04 ENCOUNTER — ANESTHESIA (OUTPATIENT)
Dept: OBGYN | Facility: CLINIC | Age: 27
End: 2022-12-04
Payer: COMMERCIAL

## 2022-12-04 ENCOUNTER — ANESTHESIA EVENT (OUTPATIENT)
Dept: OBGYN | Facility: CLINIC | Age: 27
End: 2022-12-04
Payer: COMMERCIAL

## 2022-12-04 LAB
HGB BLD-MCNC: 10.3 G/DL (ref 11.7–15.7)
PLATELET # BLD AUTO: 234 10E3/UL (ref 150–450)

## 2022-12-04 PROCEDURE — 250N000013 HC RX MED GY IP 250 OP 250 PS 637

## 2022-12-04 PROCEDURE — 59400 OBSTETRICAL CARE: CPT | Performed by: FAMILY MEDICINE

## 2022-12-04 PROCEDURE — 36415 COLL VENOUS BLD VENIPUNCTURE: CPT

## 2022-12-04 PROCEDURE — 85049 AUTOMATED PLATELET COUNT: CPT

## 2022-12-04 PROCEDURE — 250N000013 HC RX MED GY IP 250 OP 250 PS 637: Performed by: FAMILY MEDICINE

## 2022-12-04 PROCEDURE — 250N000011 HC RX IP 250 OP 636: Performed by: ANESTHESIOLOGY

## 2022-12-04 PROCEDURE — 00HU33Z INSERTION OF INFUSION DEVICE INTO SPINAL CANAL, PERCUTANEOUS APPROACH: ICD-10-PCS | Performed by: ANESTHESIOLOGY

## 2022-12-04 PROCEDURE — 722N000001 HC LABOR CARE VAGINAL DELIVERY SINGLE

## 2022-12-04 PROCEDURE — 0DQR0ZZ REPAIR ANAL SPHINCTER, OPEN APPROACH: ICD-10-PCS | Performed by: FAMILY MEDICINE

## 2022-12-04 PROCEDURE — 258N000003 HC RX IP 258 OP 636: Performed by: ANESTHESIOLOGY

## 2022-12-04 PROCEDURE — 3E0R3BZ INTRODUCTION OF ANESTHETIC AGENT INTO SPINAL CANAL, PERCUTANEOUS APPROACH: ICD-10-PCS | Performed by: ANESTHESIOLOGY

## 2022-12-04 PROCEDURE — 370N000003 HC ANESTHESIA WARD SERVICE

## 2022-12-04 PROCEDURE — 120N000001 HC R&B MED SURG/OB

## 2022-12-04 PROCEDURE — 0UQGXZZ REPAIR VAGINA, EXTERNAL APPROACH: ICD-10-PCS | Performed by: FAMILY MEDICINE

## 2022-12-04 PROCEDURE — 250N000009 HC RX 250

## 2022-12-04 PROCEDURE — 250N000009 HC RX 250: Performed by: ANESTHESIOLOGY

## 2022-12-04 PROCEDURE — 85018 HEMOGLOBIN: CPT

## 2022-12-04 PROCEDURE — 258N000003 HC RX IP 258 OP 636

## 2022-12-04 PROCEDURE — 250N000011 HC RX IP 250 OP 636

## 2022-12-04 PROCEDURE — 10907ZC DRAINAGE OF AMNIOTIC FLUID, THERAPEUTIC FROM PRODUCTS OF CONCEPTION, VIA NATURAL OR ARTIFICIAL OPENING: ICD-10-PCS | Performed by: FAMILY MEDICINE

## 2022-12-04 RX ORDER — DOCUSATE SODIUM 100 MG/1
100 CAPSULE, LIQUID FILLED ORAL 2 TIMES DAILY
Status: CANCELLED | OUTPATIENT
Start: 2022-12-04

## 2022-12-04 RX ORDER — ACETAMINOPHEN 325 MG/1
650 TABLET ORAL EVERY 4 HOURS PRN
Status: DISCONTINUED | OUTPATIENT
Start: 2022-12-04 | End: 2022-12-05 | Stop reason: HOSPADM

## 2022-12-04 RX ORDER — PROCHLORPERAZINE 25 MG
25 SUPPOSITORY, RECTAL RECTAL EVERY 12 HOURS PRN
Status: DISCONTINUED | OUTPATIENT
Start: 2022-12-04 | End: 2022-12-04 | Stop reason: HOSPADM

## 2022-12-04 RX ORDER — CARBOPROST TROMETHAMINE 250 UG/ML
250 INJECTION, SOLUTION INTRAMUSCULAR
Status: DISCONTINUED | OUTPATIENT
Start: 2022-12-04 | End: 2022-12-05 | Stop reason: HOSPADM

## 2022-12-04 RX ORDER — MISOPROSTOL 200 UG/1
800 TABLET ORAL
Status: DISCONTINUED | OUTPATIENT
Start: 2022-12-04 | End: 2022-12-05 | Stop reason: HOSPADM

## 2022-12-04 RX ORDER — OXYTOCIN/0.9 % SODIUM CHLORIDE 30/500 ML
340 PLASTIC BAG, INJECTION (ML) INTRAVENOUS CONTINUOUS PRN
Status: DISCONTINUED | OUTPATIENT
Start: 2022-12-04 | End: 2022-12-05 | Stop reason: HOSPADM

## 2022-12-04 RX ORDER — LIDOCAINE HYDROCHLORIDE AND EPINEPHRINE 15; 5 MG/ML; UG/ML
INJECTION, SOLUTION EPIDURAL PRN
Status: DISCONTINUED | OUTPATIENT
Start: 2022-12-04 | End: 2022-12-04

## 2022-12-04 RX ORDER — CARBOPROST TROMETHAMINE 250 UG/ML
250 INJECTION, SOLUTION INTRAMUSCULAR
Status: CANCELLED | OUTPATIENT
Start: 2022-12-04

## 2022-12-04 RX ORDER — NALOXONE HYDROCHLORIDE 0.4 MG/ML
0.4 INJECTION, SOLUTION INTRAMUSCULAR; INTRAVENOUS; SUBCUTANEOUS
Status: DISCONTINUED | OUTPATIENT
Start: 2022-12-04 | End: 2022-12-04 | Stop reason: HOSPADM

## 2022-12-04 RX ORDER — MISOPROSTOL 200 UG/1
400 TABLET ORAL
Status: CANCELLED | OUTPATIENT
Start: 2022-12-04

## 2022-12-04 RX ORDER — MISOPROSTOL 200 UG/1
400 TABLET ORAL
Status: DISCONTINUED | OUTPATIENT
Start: 2022-12-04 | End: 2022-12-04 | Stop reason: HOSPADM

## 2022-12-04 RX ORDER — NALOXONE HYDROCHLORIDE 0.4 MG/ML
0.2 INJECTION, SOLUTION INTRAMUSCULAR; INTRAVENOUS; SUBCUTANEOUS
Status: DISCONTINUED | OUTPATIENT
Start: 2022-12-04 | End: 2022-12-04 | Stop reason: HOSPADM

## 2022-12-04 RX ORDER — MODIFIED LANOLIN
OINTMENT (GRAM) TOPICAL
Status: DISCONTINUED | OUTPATIENT
Start: 2022-12-04 | End: 2022-12-05 | Stop reason: HOSPADM

## 2022-12-04 RX ORDER — LIDOCAINE 40 MG/G
CREAM TOPICAL
Status: DISCONTINUED | OUTPATIENT
Start: 2022-12-04 | End: 2022-12-04 | Stop reason: HOSPADM

## 2022-12-04 RX ORDER — MISOPROSTOL 200 UG/1
800 TABLET ORAL
Status: CANCELLED | OUTPATIENT
Start: 2022-12-04

## 2022-12-04 RX ORDER — OXYTOCIN/0.9 % SODIUM CHLORIDE 30/500 ML
1-24 PLASTIC BAG, INJECTION (ML) INTRAVENOUS CONTINUOUS
Status: DISCONTINUED | OUTPATIENT
Start: 2022-12-04 | End: 2022-12-04 | Stop reason: HOSPADM

## 2022-12-04 RX ORDER — MISOPROSTOL 200 UG/1
800 TABLET ORAL
Status: DISCONTINUED | OUTPATIENT
Start: 2022-12-04 | End: 2022-12-04 | Stop reason: HOSPADM

## 2022-12-04 RX ORDER — MISOPROSTOL 200 UG/1
400 TABLET ORAL
Status: DISCONTINUED | OUTPATIENT
Start: 2022-12-04 | End: 2022-12-05 | Stop reason: HOSPADM

## 2022-12-04 RX ORDER — ACETAMINOPHEN 325 MG/1
650 TABLET ORAL EVERY 4 HOURS PRN
Status: DISCONTINUED | OUTPATIENT
Start: 2022-12-04 | End: 2022-12-04 | Stop reason: HOSPADM

## 2022-12-04 RX ORDER — METOCLOPRAMIDE 10 MG/1
10 TABLET ORAL EVERY 6 HOURS PRN
Status: DISCONTINUED | OUTPATIENT
Start: 2022-12-04 | End: 2022-12-04 | Stop reason: HOSPADM

## 2022-12-04 RX ORDER — FENTANYL CITRATE 50 UG/ML
50 INJECTION, SOLUTION INTRAMUSCULAR; INTRAVENOUS EVERY 30 MIN PRN
Status: DISCONTINUED | OUTPATIENT
Start: 2022-12-04 | End: 2022-12-04 | Stop reason: HOSPADM

## 2022-12-04 RX ORDER — CITRIC ACID/SODIUM CITRATE 334-500MG
30 SOLUTION, ORAL ORAL
Status: DISCONTINUED | OUTPATIENT
Start: 2022-12-04 | End: 2022-12-04 | Stop reason: HOSPADM

## 2022-12-04 RX ORDER — OXYTOCIN 10 [USP'U]/ML
10 INJECTION, SOLUTION INTRAMUSCULAR; INTRAVENOUS
Status: CANCELLED | OUTPATIENT
Start: 2022-12-04

## 2022-12-04 RX ORDER — METHYLERGONOVINE MALEATE 0.2 MG/ML
200 INJECTION INTRAVENOUS
Status: DISCONTINUED | OUTPATIENT
Start: 2022-12-04 | End: 2022-12-05 | Stop reason: HOSPADM

## 2022-12-04 RX ORDER — OXYTOCIN/0.9 % SODIUM CHLORIDE 30/500 ML
100-340 PLASTIC BAG, INJECTION (ML) INTRAVENOUS CONTINUOUS PRN
Status: DISCONTINUED | OUTPATIENT
Start: 2022-12-04 | End: 2022-12-05 | Stop reason: HOSPADM

## 2022-12-04 RX ORDER — METOCLOPRAMIDE HYDROCHLORIDE 5 MG/ML
10 INJECTION INTRAMUSCULAR; INTRAVENOUS EVERY 6 HOURS PRN
Status: DISCONTINUED | OUTPATIENT
Start: 2022-12-04 | End: 2022-12-04 | Stop reason: HOSPADM

## 2022-12-04 RX ORDER — ONDANSETRON 2 MG/ML
4 INJECTION INTRAMUSCULAR; INTRAVENOUS EVERY 6 HOURS PRN
Status: DISCONTINUED | OUTPATIENT
Start: 2022-12-04 | End: 2022-12-04 | Stop reason: HOSPADM

## 2022-12-04 RX ORDER — CARBOPROST TROMETHAMINE 250 UG/ML
250 INJECTION, SOLUTION INTRAMUSCULAR
Status: DISCONTINUED | OUTPATIENT
Start: 2022-12-04 | End: 2022-12-04 | Stop reason: HOSPADM

## 2022-12-04 RX ORDER — DOCUSATE SODIUM 100 MG/1
100 CAPSULE, LIQUID FILLED ORAL 2 TIMES DAILY
Status: DISCONTINUED | OUTPATIENT
Start: 2022-12-04 | End: 2022-12-05 | Stop reason: HOSPADM

## 2022-12-04 RX ORDER — SODIUM CHLORIDE, SODIUM LACTATE, POTASSIUM CHLORIDE, CALCIUM CHLORIDE 600; 310; 30; 20 MG/100ML; MG/100ML; MG/100ML; MG/100ML
INJECTION, SOLUTION INTRAVENOUS CONTINUOUS
Status: DISCONTINUED | OUTPATIENT
Start: 2022-12-04 | End: 2022-12-04 | Stop reason: HOSPADM

## 2022-12-04 RX ORDER — BUPIVACAINE HYDROCHLORIDE 2.5 MG/ML
INJECTION, SOLUTION EPIDURAL; INFILTRATION; INTRACAUDAL
Status: COMPLETED | OUTPATIENT
Start: 2022-12-04 | End: 2022-12-04

## 2022-12-04 RX ORDER — OXYTOCIN 10 [USP'U]/ML
10 INJECTION, SOLUTION INTRAMUSCULAR; INTRAVENOUS
Status: DISCONTINUED | OUTPATIENT
Start: 2022-12-04 | End: 2022-12-04 | Stop reason: HOSPADM

## 2022-12-04 RX ORDER — OXYTOCIN 10 [USP'U]/ML
10 INJECTION, SOLUTION INTRAMUSCULAR; INTRAVENOUS
Status: DISCONTINUED | OUTPATIENT
Start: 2022-12-04 | End: 2022-12-05 | Stop reason: HOSPADM

## 2022-12-04 RX ORDER — MODIFIED LANOLIN
OINTMENT (GRAM) TOPICAL
Status: CANCELLED | OUTPATIENT
Start: 2022-12-04

## 2022-12-04 RX ORDER — OXYTOCIN/0.9 % SODIUM CHLORIDE 30/500 ML
340 PLASTIC BAG, INJECTION (ML) INTRAVENOUS CONTINUOUS PRN
Status: CANCELLED | OUTPATIENT
Start: 2022-12-04

## 2022-12-04 RX ORDER — METHYLERGONOVINE MALEATE 0.2 MG/ML
200 INJECTION INTRAVENOUS
Status: CANCELLED | OUTPATIENT
Start: 2022-12-04

## 2022-12-04 RX ORDER — HYDROCORTISONE 25 MG/G
CREAM TOPICAL 3 TIMES DAILY PRN
Status: CANCELLED | OUTPATIENT
Start: 2022-12-04

## 2022-12-04 RX ORDER — SODIUM CHLORIDE, SODIUM LACTATE, POTASSIUM CHLORIDE, CALCIUM CHLORIDE 600; 310; 30; 20 MG/100ML; MG/100ML; MG/100ML; MG/100ML
INJECTION, SOLUTION INTRAVENOUS CONTINUOUS PRN
Status: DISCONTINUED | OUTPATIENT
Start: 2022-12-04 | End: 2022-12-04 | Stop reason: HOSPADM

## 2022-12-04 RX ORDER — OXYTOCIN/0.9 % SODIUM CHLORIDE 30/500 ML
340 PLASTIC BAG, INJECTION (ML) INTRAVENOUS CONTINUOUS PRN
Status: DISCONTINUED | OUTPATIENT
Start: 2022-12-04 | End: 2022-12-04 | Stop reason: HOSPADM

## 2022-12-04 RX ORDER — FENTANYL/ROPIVACAINE/NS/PF 2MCG/ML-.1
PLASTIC BAG, INJECTION (ML) EPIDURAL
Status: DISCONTINUED | OUTPATIENT
Start: 2022-12-04 | End: 2022-12-04 | Stop reason: HOSPADM

## 2022-12-04 RX ORDER — ONDANSETRON 4 MG/1
4 TABLET, ORALLY DISINTEGRATING ORAL EVERY 6 HOURS PRN
Status: DISCONTINUED | OUTPATIENT
Start: 2022-12-04 | End: 2022-12-04 | Stop reason: HOSPADM

## 2022-12-04 RX ORDER — CITRIC ACID/SODIUM CITRATE 334-500MG
30 SOLUTION, ORAL ORAL ONCE
Status: DISCONTINUED | OUTPATIENT
Start: 2022-12-04 | End: 2022-12-04 | Stop reason: HOSPADM

## 2022-12-04 RX ORDER — NALBUPHINE HYDROCHLORIDE 10 MG/ML
2.5-5 INJECTION, SOLUTION INTRAMUSCULAR; INTRAVENOUS; SUBCUTANEOUS EVERY 6 HOURS PRN
Status: DISCONTINUED | OUTPATIENT
Start: 2022-12-04 | End: 2022-12-05 | Stop reason: HOSPADM

## 2022-12-04 RX ORDER — MAGNESIUM CARB/ALUMINUM HYDROX 105-160MG
30 TABLET,CHEWABLE ORAL DAILY PRN
Status: DISCONTINUED | OUTPATIENT
Start: 2022-12-04 | End: 2022-12-05 | Stop reason: HOSPADM

## 2022-12-04 RX ORDER — EPHEDRINE SULFATE 50 MG/ML
5 INJECTION, SOLUTION INTRAMUSCULAR; INTRAVENOUS; SUBCUTANEOUS
Status: DISCONTINUED | OUTPATIENT
Start: 2022-12-04 | End: 2022-12-04 | Stop reason: HOSPADM

## 2022-12-04 RX ORDER — PROCHLORPERAZINE MALEATE 10 MG
10 TABLET ORAL EVERY 6 HOURS PRN
Status: DISCONTINUED | OUTPATIENT
Start: 2022-12-04 | End: 2022-12-04 | Stop reason: HOSPADM

## 2022-12-04 RX ORDER — HYDROCORTISONE 25 MG/G
CREAM TOPICAL 3 TIMES DAILY PRN
Status: DISCONTINUED | OUTPATIENT
Start: 2022-12-04 | End: 2022-12-05 | Stop reason: HOSPADM

## 2022-12-04 RX ORDER — SODIUM CHLORIDE, SODIUM LACTATE, POTASSIUM CHLORIDE, CALCIUM CHLORIDE 600; 310; 30; 20 MG/100ML; MG/100ML; MG/100ML; MG/100ML
INJECTION, SOLUTION INTRAVENOUS CONTINUOUS
Status: DISCONTINUED | OUTPATIENT
Start: 2022-12-04 | End: 2022-12-05 | Stop reason: HOSPADM

## 2022-12-04 RX ORDER — ONDANSETRON 8 MG/1
8 TABLET, FILM COATED ORAL EVERY 8 HOURS PRN
Status: DISCONTINUED | OUTPATIENT
Start: 2022-12-04 | End: 2022-12-05 | Stop reason: HOSPADM

## 2022-12-04 RX ORDER — FERROUS SULFATE 325(65) MG
325 TABLET ORAL 2 TIMES DAILY
Status: DISCONTINUED | OUTPATIENT
Start: 2022-12-04 | End: 2022-12-05 | Stop reason: HOSPADM

## 2022-12-04 RX ORDER — METHYLERGONOVINE MALEATE 0.2 MG/ML
200 INJECTION INTRAVENOUS
Status: DISCONTINUED | OUTPATIENT
Start: 2022-12-04 | End: 2022-12-04 | Stop reason: HOSPADM

## 2022-12-04 RX ORDER — ACETAMINOPHEN 325 MG/1
650 TABLET ORAL EVERY 4 HOURS PRN
Status: CANCELLED | OUTPATIENT
Start: 2022-12-04

## 2022-12-04 RX ADMIN — ACETAMINOPHEN 650 MG: 325 TABLET ORAL at 17:35

## 2022-12-04 RX ADMIN — BUPIVACAINE HYDROCHLORIDE 7 ML: 2.5 INJECTION, SOLUTION EPIDURAL; INFILTRATION; INTRACAUDAL at 02:40

## 2022-12-04 RX ADMIN — SERTRALINE HYDROCHLORIDE 25 MG: 25 TABLET, FILM COATED ORAL at 17:35

## 2022-12-04 RX ADMIN — LIDOCAINE HYDROCHLORIDE 20 ML: 10 INJECTION, SOLUTION INFILTRATION; PERINEURAL at 11:20

## 2022-12-04 RX ADMIN — MISOPROSTOL 800 MCG: 200 TABLET ORAL at 11:46

## 2022-12-04 RX ADMIN — EPHEDRINE SULFATE 5 MG: 5 INJECTION INTRAVENOUS at 03:46

## 2022-12-04 RX ADMIN — FENTANYL CITRATE 50 MCG: 50 INJECTION, SOLUTION INTRAMUSCULAR; INTRAVENOUS at 11:58

## 2022-12-04 RX ADMIN — Medication 2 MILLI-UNITS/MIN: at 04:43

## 2022-12-04 RX ADMIN — FERROUS SULFATE TAB 325 MG (65 MG ELEMENTAL FE) 325 MG: 325 (65 FE) TAB at 20:56

## 2022-12-04 RX ADMIN — EPHEDRINE SULFATE 5 MG: 5 INJECTION INTRAVENOUS at 03:36

## 2022-12-04 RX ADMIN — ONDANSETRON 4 MG: 2 INJECTION INTRAMUSCULAR; INTRAVENOUS at 07:43

## 2022-12-04 RX ADMIN — ACETAMINOPHEN 650 MG: 325 TABLET ORAL at 12:55

## 2022-12-04 RX ADMIN — SODIUM CHLORIDE, POTASSIUM CHLORIDE, SODIUM LACTATE AND CALCIUM CHLORIDE 1000 ML: 600; 310; 30; 20 INJECTION, SOLUTION INTRAVENOUS at 00:31

## 2022-12-04 RX ADMIN — Medication: at 02:34

## 2022-12-04 RX ADMIN — SODIUM CHLORIDE, POTASSIUM CHLORIDE, SODIUM LACTATE AND CALCIUM CHLORIDE 1000 ML: 600; 310; 30; 20 INJECTION, SOLUTION INTRAVENOUS at 03:37

## 2022-12-04 RX ADMIN — ACETAMINOPHEN 650 MG: 325 TABLET ORAL at 23:01

## 2022-12-04 RX ADMIN — EPHEDRINE SULFATE 5 MG: 5 INJECTION INTRAVENOUS at 03:29

## 2022-12-04 RX ADMIN — Medication: at 21:14

## 2022-12-04 RX ADMIN — LIDOCAINE HYDROCHLORIDE 20 ML: 10 INJECTION, SOLUTION EPIDURAL; INFILTRATION; INTRACAUDAL; PERINEURAL at 11:52

## 2022-12-04 RX ADMIN — SODIUM CHLORIDE, POTASSIUM CHLORIDE, SODIUM LACTATE AND CALCIUM CHLORIDE 250 ML: 600; 310; 30; 20 INJECTION, SOLUTION INTRAVENOUS at 02:42

## 2022-12-04 RX ADMIN — DOCUSATE SODIUM 100 MG: 100 CAPSULE, LIQUID FILLED ORAL at 17:35

## 2022-12-04 RX ADMIN — LIDOCAINE HYDROCHLORIDE,EPINEPHRINE BITARTRATE 3 ML: 15; .005 INJECTION, SOLUTION EPIDURAL; INFILTRATION; INTRACAUDAL; PERINEURAL at 02:36

## 2022-12-04 ASSESSMENT — ACTIVITIES OF DAILY LIVING (ADL)
ADLS_ACUITY_SCORE: 18

## 2022-12-04 NOTE — ANESTHESIA POSTPROCEDURE EVALUATION
Patient: Ailin Calhoun    Procedure: * No procedures listed *       Anesthesia Type:  Epidural    Note:  Disposition: Inpatient   Postop Pain Control: Uneventful            Sign Out: Well controlled pain   PONV: No   Neuro/Psych: Uneventful            Sign Out: Acceptable/Baseline neuro status   Airway/Respiratory: Uneventful            Sign Out: Acceptable/Baseline resp. status   CV/Hemodynamics: Uneventful            Sign Out: Acceptable CV status; No obvious hypovolemia; No obvious fluid overload   Other NRE: NONE   DID A NON-ROUTINE EVENT OCCUR? No    Event details/Postop Comments:  Epidural resolved, no back pain, no apparent neurologic complications.           Last vitals:  Vitals:    12/04/22 1345 12/04/22 1400 12/04/22 1440   BP: 126/70 130/59 129/70   Pulse:      Resp:   18   Temp:      SpO2:   97%       Electronically Signed By: Paulino Barba MD  December 4, 2022  5:01 PM

## 2022-12-04 NOTE — PLAN OF CARE
Problem: Plan of Care - These are the overarching goals to be used throughout the patient stay.    Goal: Optimal Comfort and Wellbeing  Outcome: Progressing  Intervention: Monitor Pain and Promote Comfort  Recent Flowsheet Documentation  Taken 12/3/2022 2325 by Samira Plata, RN  Pain Management Interventions: declines  Taken 12/3/2022 1900 by Samira Plata, RN  Pain Management Interventions: declines       Patient received labor epidural at 0235, cervidial out at 0200 and was 7cm. Started on pitocin at 0445 due to contractions shaping out and no cervical change. Comfortable with epidural. MD paige going come in and AROM this am.

## 2022-12-04 NOTE — ANESTHESIA PROCEDURE NOTES
"Epidural catheter Procedure Note    Pre-Procedure   Staff -        Anesthesiologist:  Elfego Marte MD       Performed By: anesthesiologist       Location: OB       Procedure Start/Stop Times: 12/4/2022 2:25 AM and 12/4/2022 2:41 AM       Pre-Anesthestic Checklist: patient identified, IV checked, risks and benefits discussed, informed consent and monitors and equipment checked  Timeout:       Correct Patient: Yes        Correct Procedure: Yes        Correct Site: Yes        Correct Position: Yes   Procedure Documentation  Procedure: epidural catheter       Patient Position: sitting       Skin prep: Chloraprep       Local skin infiltrated with mL of 1% lidocaine.        Insertion Site: L3-4. (midline approach).       Technique: LORT saline        REA at 7 cm.       Needle Type: [email or web address] needle       Needle Gauge: 18.        Needle Length (Inches): 3.5        Catheter: 20 G.          Catheter threaded easily.           Threaded 11 cm at skin.      Assessment/Narrative         Paresthesias: Resolved.       Test dose of 3 mL lidocaine 1.5% w/ 1:200,000 epinephrine at.         Test dose negative, 3 minutes after injection, for signs of intravascular, subdural, or intrathecal injection.       Insertion/Infusion Method: LORT saline       Aspiration negative for Heme or CSF via Epidural Catheter.    Medication(s) Administered   0.25% Bupivacaine PF (Epidural) - EPIDURAL   7 mL - 12/4/2022 2:40:00 AM  Medication Administration Time: 12/4/2022 2:25 AM      FOR [facility] ([facility]) ONLY:   Pain Team Contact information: please page the Pain Team Via P2P-Next. Search \"Pain\". During daytime hours, please page the attending first. At night please page the resident first.    "

## 2022-12-04 NOTE — L&D DELIVERY NOTE
OB Vaginal Delivery Note    Ailin Calhoun MRN# [id number]   Age: 27 year old YOB: 1995       GA: 40w4d  GP:   Labor Complications: None   EBL: 500  mL  Delivery QBL:    Delivery Type: Vaginal, Spontaneous   ROM to Delivery Time: (Delivered) Hours: 3 Minutes: 56   Weight: 4.32 kg (9 lb 8.4 oz)    1 Minute 5 Minute 10 Minute   Apgar Totals: 8   9        JOSE MARTIN MCCONNELL;ASHLEY CARDENAS;LUIS MCKEON;MAURICIO CARDENAS     Delivery Details:  Ailin Calhoun, a 27 year old  female delivered a viable infant with apgars of 8  and 9 . Patient was fully dilated and pushing after   hours   minutes in active labor. Delivery was via vaginal, spontaneous  to a sterile field under epidural  anesthesia. Infant delivered in vertex  right  occiput  anterior  position. Anterior and posterior shoulders delivered without difficulty. The cord was clamped, cut twice and 3 vessels  were noted. Cord blood was obtained in routine fashion with the following disposition: lab .      Cord complications: none   Placenta delivered at 2022 11:24 AM . Placental disposition was Hospital disposal . Fundal massage performed and fundus found to be firm.     Episiotomy: none    Perineum, vagina, cervix were inspected, and the following lacerations were noted:   Perineal lacerations: 3rd         vaginal laceration noted       3rd degree ( 3 b more >50% external sphincter was torn) lacerations were repaired in the usual fashion using  3.0 vicryl     Procedure :Grapsped two severed ends of the dark red EAS muscle with Allis clamps. End to end repair done with 4 interrupted sutures, rest of the repair done in usual fashion            Excellent hemostasis was noted. Needle count correct. Infant and patient in delivery room in good and stable condition.        Je Calhoun-Ailin [[id number]]    Labor Event Times    Labor onset date: 12/3/22 Onset time: 11:00 PM   Dilation complete date: 22 Complete time:  9:35  AM   Start pushing date/time: 2022 0953      Labor Events     labor?: No   steroids: None  Labor Type: Induction/Cervical ripening  Predominate monitoring during 1st stage: continuous electronic fetal monitoring     Antibiotics received during labor?: No     Rupture date/time: 22 0722   Rupture type: Artificial Rupture of Membranes  Fluid color: Clear, Bloody     Induction: Misoprostol, Cervidil  Induction date/time:     Cervical ripening date/time: 22 0930   Indications for induction: Elective     Augmentation: AROM     Delivery/Placenta Date and Time    Delivery Date: 22 Delivery Time: 11:18 AM   Placenta Date/Time: 2022 11:24 AM  Oxytocin given at the time of delivery: after delivery of baby  Delivering clinician: Ilene Levin MD   Other personnel present at delivery:  Provider Role   Joya Adams MD Resident   Willy, Lubna, RN Delivery Nurse   Zuly Golden RN Registered Nurse   Lexy Cardenas RN Registered Nurse         Vaginal Counts     Initial count performed by 2 team members:  Two Team Members   alyssa he rn       Needles Suture Needles Sponges (RETIRED) Instruments   Initial counts 0 0 0    Added to count 1 2 5    Relief counts       Final counts             Placed during labor Accounted for at the end of labor   FSE NA NA   IUPC NA NA   Cervidil NA NA                     Apgars    Living status: Living   1 Minute 5 Minute 10 Minute 15 Minute 20 Minute   Skin color: 0  1       Heart rate: 2  2       Reflex irritability: 2  2       Muscle tone: 2  2       Respiratory effort: 2  2       Total: 8  9       Apgars assigned by: LUBNA CARDENAS RN     Cord    Vessels: 3 Vessels    Cord Complications: None               Cord Blood Disposition: Lab    Gases Sent?: No    Delayed cord clamping?: Yes    Cord Clamping Delay (seconds): >120 seconds    Stem cell collection?: No       [place] Resuscitation    Methods: None  Output in Delivery Room: Stool    "   Measurements    Weight: 9 lb 8.4 oz Length: 1' 10\"   Head circumference: 34.5 cm    Output in delivery room: Stool     Skin to Skin and Feeding Plan    Skin to skin initiation date/time: 1841    Skin to skin with: Mother  Skin to skin end date/time:        Labor Events and Shoulder Dystocia    Fetal Tracing Prior to Delivery: Category 1  Shoulder dystocia present?: Neg     Delivery (Maternal) (Provider to Complete) ([id number])    Episiotomy: None  Perineal lacerations: 3rd Repaired?: Yes   Vaginal laceration?: Yes    Est. blood loss (mL): 500  Repair suture: 3-0 Vicryl  Number of repair packets: 4  Genital tract inspection done: Pos     Blood Loss  Mother: Ailin Calhoun #[id number]   Start of Mother's Information    Delivery Blood Loss  22 2300 - 22 1212    EBL (mL) Hospital Encounter 500 mL    Total  500 mL         End of Mother's Information  Mother: Ailin Calhoun #[id number]          Delivery - Provider to Complete ([id number])    Delivering clinician: Ilene Levin MD  Attempted Delivery Types (Choose all that apply): Spontaneous Vaginal Delivery  Delivery Type (Choose the 1 that will go to the Birth History): Vaginal, Spontaneous                   Other personnel:  Provider Role   Joya Adams MD Resident   Willy, Lubna, RN Delivery Nurse   Zuly Golden RN Registered Nurse   Lexy Aguilera RN Registered Nurse                Placenta    Date/Time: 2022 11:24 AM  Removal: Spontaneous  Disposition: Hospital disposal           Anesthesia    Method: Epidural  Cervical dilation at placement: 4-7                Presentation and Position    Presentation: Vertex    Position: Right Occiput Anterior                 Ilene Levin MD 2022 12:12 PM   [facility].  [phone]   "

## 2022-12-04 NOTE — PROGRESS NOTES
Labor Progress Note    Assessment/Plan  27 year old  at 40w4d gestational age admitted for labor induction for maternal obesity, EFW 90%ile, postdates. S/p Cytotec x12 doses. Cervidil removed at 0200. Patient more uncomfortable with contractions. Requesting epidural. Cervix now 7/90/0.     - Epidural per patient   - Continue to monitor FHR  - Anticipate   - Dr. Tillman updated    Subjective  Patient feeling more uncomfortable with contractions. Requesting an epidural. Contractions q 3-4 min. Nauseous.     Objective  Vital signs in last 24 hours  Temp:  [98  F (36.7  C)-98.7  F (37.1  C)] 98  F (36.7  C)  Resp:  [16-18] 16  BP: (113-141)/(57-90) 136/77  SpO2:  [96 %-97 %] 97 %      Physical Exam  General: Sitting up in bed, uncomfortable with contractions  Abd: Gravid  Cervix: 7cm, 90% effaced, 0 station  FHR: Baseline 130/moderate variability/positive accels/No decels; Category I tracing  [place]: Contractions Q 3-4 min, by palpation    Pertinent Labs   Lab Results   Component Value Date    ABORH O POS 2022    HGB 11.9 2022        Patient discussed with attending physician, Dr. Tillman , who agrees with the plan.     Sara Hampton DO PGY1 2022  [facility]

## 2022-12-04 NOTE — PROGRESS NOTES
Patient received epidural at ~0230. SVE 7/90%/-1. Category I tracing. Prior to epidural station was at 0. Discussed with Dr. Tillman. Will start low dose Pitocin.     Sara Hampton DO

## 2022-12-04 NOTE — ANESTHESIA PREPROCEDURE EVALUATION
Anesthesia Pre-Procedure Evaluation    Patient: Ailin Calhoun   MRN: [id number] : 1995        Procedure :           Past Medical History:   Diagnosis Date     Asthma      Depressive disorder       Past Surgical History:   Procedure Laterality Date     HEMILAMINECTOMY, DISCECTOMY LUMBAR ONE LEVEL, COMBINED N/A 2022    Procedure: HEMILAMINECTOMY, SPINE, LUMBAR, 1 LEVEL, WITH DISCECTOMY;  Surgeon: Barrie Owen MD;  Location: [facility] OR     LAMINECTOMY LUMBAR ONE LEVEL N/A 2022    Procedure: LAMINECTOMY, SPINE, LUMBAR, 1 LEVEL;  Surgeon: Barrie Owen MD;  Location: [facility] OR      Allergies   Allergen Reactions     Aspirin Difficulty breathing, Hives, Itching, Nausea, Nausea and Vomiting and Shortness Of Breath     Ibuprofen Difficulty breathing, Fatigue, Headache, Itching, Nausea, Nausea and Vomiting and Shortness Of Breath     Aspirin      Ibuprofen       Social History     Tobacco Use     Smoking status: Never     Smokeless tobacco: Never   Substance Use Topics     Alcohol use: Not Currently      Wt Readings from Last 1 Encounters:   22 121.1 kg (267 lb)        Anesthesia Evaluation            ROS/MED HX  ENT/Pulmonary:     (+) asthma     Neurologic:  - neg neurologic ROS     Cardiovascular:  - neg cardiovascular ROS     METS/Exercise Tolerance:     Hematologic:  - neg hematologic  ROS     Musculoskeletal:       GI/Hepatic:  - neg GI/hepatic ROS     Renal/Genitourinary:       Endo:     (+) Obesity,     Psychiatric/Substance Use:       Infectious Disease:       Malignancy:       Other:            Physical Exam    Airway        Mallampati: II       Respiratory Devices and Support         Dental  no notable dental history         Cardiovascular   cardiovascular exam normal          Pulmonary   pulmonary exam normal                OUTSIDE LABS:  CBC:   Lab Results   Component Value Date    WBC 11.1 (H) 2022    WBC 12.7 (H) 2022    HGB 11.9 2022    HGB 12.4  11/19/2022    HCT 36.4 12/02/2022    HCT 36.8 11/19/2022     12/02/2022     11/19/2022     BMP:   Lab Results   Component Value Date     11/19/2022     09/17/2022    POTASSIUM 4.2 11/19/2022    POTASSIUM 4.4 09/17/2022    CHLORIDE 107 11/19/2022    CHLORIDE 108 09/17/2022    CO2 17 (L) 11/19/2022    CO2 25 09/17/2022    BUN 7 (L) 11/19/2022    BUN 3 (L) 09/17/2022    CR 0.64 11/19/2022    CR 0.51 (L) 09/17/2022     11/19/2022     (H) 09/17/2022     COAGS:   Lab Results   Component Value Date    PTT 26 09/17/2022    INR 0.98 09/17/2022     POC:   Lab Results   Component Value Date    HCG Negative 03/23/2022     HEPATIC: No results found for: ALBUMIN, PROTTOTAL, ALT, AST, GGT, ALKPHOS, BILITOTAL, BILIDIRECT, ASTER  OTHER:   Lab Results   Component Value Date    PH 7.37 09/17/2022    LACT 1.3 09/17/2022    A1C 5.3 05/04/2022    SRINIVAS 9.4 11/19/2022       Anesthesia Plan    ASA Status:  2      Anesthesia Type: Epidural.              Consents    Anesthesia Plan(s) and associated risks, benefits, and realistic alternatives discussed. Questions answered and patient/representative(s) expressed understanding.    - Discussed:     - Discussed with:  Patient         Postoperative Care            Comments:           neg OB ROS.       JOSELYN MCCOY MD

## 2022-12-04 NOTE — PROGRESS NOTES
Patient up to bathroom, unable to void. Reported feeling light headed while standing in bathroom. Assisted pt back to bed; VSS. Pt reports feeling better once in bed, educated on calling RN before getting out of bed again. Will bladder scan. Bleeding light to moderate, fundus firm. Will continue to monitor.

## 2022-12-04 NOTE — PROGRESS NOTES
Labor Progress Note    Assessment/Plan  27 year old  at 40w4d gestational age admitted in for labor induction for maternal obesity, EFW 90%ile, postdates. S/p Cytotec x12 doses. Cervidil removed at 0200. Pitocin running at 10/hr.     - Continue to monitor FHR  - Anticipate   - Discussed with Dr. Tillman  - Plan for AROM    Subjective  Patient was able to get some rest after receiving her epidural. Can feel occasional pressure with contractions. Discussing that Dr. Tillman would likely come in to AROM patient, which patient is in agreement with.     Objective  Vital signs in last 24 hours  Temp:  [98  F (36.7  C)-98.7  F (37.1  C)] 98  F (36.7  C)  Resp:  [16-18] 16  BP: ()/(50-94) 114/55  SpO2:  [77 %-100 %] 96 %      Physical Exam  General: Laying in bed, comfortable  Abd: Gravid  Cervix: 8 cm, 90% effaced, 0 station  FHR: Baseline 135/mod variability/positive accels/no decels; Category I tracing  [place]: Contractions Q 3-4 min by palpation    Pertinent Labs   Lab Results   Component Value Date    ABORH O POS 2022    HGB 11.9 2022        Patient discussed with attending physician, Dr. Tillman , who agrees with the plan.     Sara Hampton DO PGY1 2022  [facility]

## 2022-12-04 NOTE — PROGRESS NOTES
Labor Progress Note    Subjective:  Ailin Calhoun is a 27 year old yo  who was admitted for labor induction for maternal obesity, EFW 90%ile, postdates.     Pt now complete, still relatively comfortable with epidural. Great support team present.    Objective:  Temp:  [98  F (36.7  C)-98.6  F (37  C)] 98  F (36.7  C)  Resp:  [16] 16  BP: ()/(50-94) 136/91  SpO2:  [77 %-100 %] 97 %    Cervix: 10/100/0    Membranes: AROM  - clear/blood tinged fluid    FHT: 140 bpm, moderate variability, +accels, no decls  Contractions: every 2-3 mins    Impression:  Ailin Calhoun is a 27 year old year old at 40w4d weeks here for induction.     Plan:    Anticipate  - pt will start pushing - nurse to call my partner Dr Levin for delivery    Epidural in place, pt comfortable      Lainey Tillman MD

## 2022-12-04 NOTE — PROGRESS NOTES
Labor Progress Note    Subjective:  Ailin Calhoun is a 27 year old yo  who was admitted for labor induction for maternal obesity, EFW 90%ile, postdates.     Great cervical change with cervidil - was /0 around 2am. Pt was uncomfortable at that time, epidural now in place and pt comfortable. Cervix rechecked at 4am and unchanged, nurse felt baby OP - pitocin started. At 6am up to 10 mU on pitocin, cervix slightly changed /0 - I came in for AROM.    Objective:  Temp:  [98  F (36.7  C)-98.7  F (37.1  C)] 98  F (36.7  C)  Resp:  [16-18] 16  BP: ()/(50-94) 114/55  SpO2:  [77 %-100 %] 96 %    Cervix: /0 - mostly anterior cervix left    Membranes: AROM moments ago for small amount clear fluid, blood tinged    FHT: 135 bpm, moderate variability, +accels, no decls  Contractions: every 3-5 mins    Impression:  Ailin Calhoun is a 27 year old year old at 40w4d weeks here for induction.     Plan:    Anticipate  - currently on pitocin and AROM done    Epidural in place, pt comfortable    Position changes to encourage fetal rotation and put pressure on anterior cervix    I unfortunately will be unavailable for part of today, will sign out to my partner Dr Levin who is on call    Lainey Tillman MD

## 2022-12-05 VITALS
DIASTOLIC BLOOD PRESSURE: 71 MMHG | RESPIRATION RATE: 16 BRPM | WEIGHT: 267 LBS | HEART RATE: 96 BPM | HEIGHT: 72 IN | TEMPERATURE: 97.6 F | SYSTOLIC BLOOD PRESSURE: 131 MMHG | OXYGEN SATURATION: 97 % | BODY MASS INDEX: 36.16 KG/M2

## 2022-12-05 LAB — HGB BLD-MCNC: 9.6 G/DL (ref 11.7–15.7)

## 2022-12-05 PROCEDURE — 85018 HEMOGLOBIN: CPT

## 2022-12-05 PROCEDURE — 250N000013 HC RX MED GY IP 250 OP 250 PS 637: Performed by: FAMILY MEDICINE

## 2022-12-05 PROCEDURE — 250N000013 HC RX MED GY IP 250 OP 250 PS 637

## 2022-12-05 PROCEDURE — 36415 COLL VENOUS BLD VENIPUNCTURE: CPT

## 2022-12-05 RX ORDER — ACETAMINOPHEN 500 MG
500-1000 TABLET ORAL EVERY 4 HOURS PRN
Qty: 60 TABLET | Refills: 1 | Status: SHIPPED | OUTPATIENT
Start: 2022-12-05 | End: 2023-10-12

## 2022-12-05 RX ORDER — FERROUS SULFATE 325(65) MG
325 TABLET ORAL 2 TIMES DAILY
Qty: 60 TABLET | Refills: 3 | Status: SHIPPED | OUTPATIENT
Start: 2022-12-05 | End: 2023-01-18

## 2022-12-05 RX ORDER — ALBUTEROL SULFATE 90 UG/1
2 AEROSOL, METERED RESPIRATORY (INHALATION) EVERY 6 HOURS PRN
Qty: 18 G | Refills: 1 | Status: SHIPPED | OUTPATIENT
Start: 2022-12-05 | End: 2023-01-23

## 2022-12-05 RX ADMIN — FERROUS SULFATE TAB 325 MG (65 MG ELEMENTAL FE) 325 MG: 325 (65 FE) TAB at 10:16

## 2022-12-05 RX ADMIN — ACETAMINOPHEN 650 MG: 325 TABLET ORAL at 05:57

## 2022-12-05 RX ADMIN — SERTRALINE HYDROCHLORIDE 25 MG: 25 TABLET, FILM COATED ORAL at 10:16

## 2022-12-05 RX ADMIN — ACETAMINOPHEN 650 MG: 325 TABLET ORAL at 12:09

## 2022-12-05 RX ADMIN — DOCUSATE SODIUM 100 MG: 100 CAPSULE, LIQUID FILLED ORAL at 10:16

## 2022-12-05 ASSESSMENT — ACTIVITIES OF DAILY LIVING (ADL)
ADLS_ACUITY_SCORE: 18

## 2022-12-05 NOTE — PROGRESS NOTES
"Outreach Note for EPIC    Chart reviewed, discharge plan discussed with patient, needs assessed. Patient verbalizes understanding of plan, requests [facility] visit as ordered, [facility] nurse visit planned for Tues, Dec 6th, Home Care Intake updated. Patient and , \"Milton\", phone number is reported as correct in EMR.    Patient states she has good support at home, has baby care essentials, and feels ready to discharge today. Outreach RN will continue to follow and assist if needed with discharge plan. No additional needs identified at this time.        "

## 2022-12-05 NOTE — PLAN OF CARE
Patient was able to rest throughout the night. Breastfeeding infant successfully every 2-3 hours. Patient is voiding and ambulating independently. Bonding well with infant. Vitals within normal limits.    Ritu Muir RN

## 2022-12-06 ENCOUNTER — MEDICAL CORRESPONDENCE (OUTPATIENT)
Dept: HEALTH INFORMATION MANAGEMENT | Facility: CLINIC | Age: 27
End: 2022-12-06

## 2022-12-06 NOTE — DISCHARGE SUMMARY
[facility] Discharge Summary    Ailin Calhoun MRN# [id number]   Age: 27 year old YOB: 1995     Date of Admission:  12/2/2022  Date of Discharge::  12/5/2022  2:56 PM  Admitting Physician:  Lainey Tillman MD  Discharge Physician:  Ilene Levin MD     Home clinic: [facility]           Admission Diagnoses:   Encounter for induction of labor [Z34.90]          Discharge Diagnosis:     Normal spontaneous vaginal delivery  Intrauterine pregnancy at 40 weeks gestation          Procedures:     Procedure(s): 3rd degree repair        No procedures performed during this admission           Medications Prior to Admission:     No medications prior to admission.             Discharge Medications:     Discharge Medication List as of 12/5/2022  2:23 PM      START taking these medications    Details   !! acetaminophen (TYLENOL) 500 MG tablet Take 1-2 tablets (500-1,000 mg) by mouth every 4 hours as needed for mild pain or pain, Disp-60 tablet, R-1, E-Prescribe      ferrous sulfate (FEROSUL) 325 (65 Fe) MG tablet Take 1 tablet (325 mg) by mouth 2 times daily, Disp-60 tablet, R-3, E-Prescribe       !! - Potential duplicate medications found. Please discuss with provider.      CONTINUE these medications which have CHANGED    Details   albuterol (PROAIR HFA/PROVENTIL HFA/VENTOLIN HFA) 108 (90 Base) MCG/ACT inhaler Inhale 2 puffs into the lungs every 6 hours as needed for wheezing or shortness of breath / dyspnea, Disp-18 g, R-1, E-PrescribePharmacy may dispense brand covered by insurance (Proair, or proventil or ventolin or generic albuterol inhaler)         CONTINUE these medications which have NOT CHANGED    Details   !! acetaminophen (TYLENOL) 325 MG tablet Take 2 tablets (650 mg) by mouth every 6 hours as needed for mild pain, Historical      albuterol (PROVENTIL) (2.5 MG/3ML) 0.083% neb solution Take 2 vials (5 mg) by nebulization every 6 hours as needed for shortness of breath /  dyspnea or wheezing, Disp-75 mL, R-0, Local Print      doxylamine (UNISOM) 25 MG TABS tablet Take 25 mg by mouth nightly as needed for sleep, Historical      ondansetron (ZOFRAN ODT) 4 MG ODT tab DISSOLVE 1 TABLET(4 MG) ON THE TONGUE EVERY 8 HOURS AS NEEDED FOR NAUSEA OR VOMITING, Disp-30 tablet, R-1, E-Prescribe      Prenatal Vit-Fe Fumarate-FA (PRENATAL MULTIVITAMIN  PLUS IRON) 27-1 MG TABS Take 1 tablet by mouth daily, Historical      Respiratory Therapy Supplies (NEBULIZER) SANDRA Take 1 Device by mouth every 6 hours as needed (sob cough), Disp-1 each, R-0, Local PrintInclude tubing and mask for age please.      sertraline (ZOLOFT) 25 MG tablet Take 1 tablet (25 mg) by mouth daily, Disp-90 tablet, R-3, E-Prescribe      vitamin B6 (PYRIDOXINE) 100 MG tablet Take 100 mg by mouth daily, Historical       !! - Potential duplicate medications found. Please discuss with provider.                Consultations:   No consultations were requested during this admission            Hospital Course:   The patient's hospital course was unremarkable.  On discharge, her pain was well controlled. Vaginal bleeding is similar to peak menstrual flow.  Voiding without difficulty.  Ambulating well and tolerating a normal diet.  No fever.  Breastfeeding well.  Infant is stable.  No bowel movement yet.*  She was discharged on post-partum day #1.    Post-partum hemoglobin:   Hemoglobin   Date Value Ref Range Status   12/05/2022 9.6 (L) 11.7 - 15.7 g/dL Final             Discharge Instructions and Follow-Up:     Discharge diet: Regular   Discharge activity: Lifting restricted to 15 pounds   Discharge follow-up: Follow up with primary care provider in 6 weeks   Wound care: Ice to area for comfort           Discharge Disposition:     Discharged to home      Attestation:  I have reviewed today's vital signs, notes, medications, labs and imaging.  Face-to-face time: 15 minutes  Total time: 25 minutes    Ilene Levin MD 12/6/2022 9:24 AM   M  [facility].  [phone]

## 2022-12-29 DIAGNOSIS — B37.2 YEAST INFECTION OF THE SKIN: Primary | ICD-10-CM

## 2022-12-29 RX ORDER — CLOTRIMAZOLE 1 %
CREAM (GRAM) TOPICAL 2 TIMES DAILY
Qty: 45 G | Refills: 0 | Status: SHIPPED | OUTPATIENT
Start: 2022-12-29 | End: 2023-01-12

## 2023-01-12 DIAGNOSIS — F32.A DEPRESSION, UNSPECIFIED DEPRESSION TYPE: ICD-10-CM

## 2023-01-18 ENCOUNTER — PRENATAL OFFICE VISIT (OUTPATIENT)
Dept: FAMILY MEDICINE | Facility: CLINIC | Age: 28
End: 2023-01-18
Payer: COMMERCIAL

## 2023-01-18 VITALS
WEIGHT: 248.24 LBS | OXYGEN SATURATION: 95 % | DIASTOLIC BLOOD PRESSURE: 60 MMHG | BODY MASS INDEX: 33.67 KG/M2 | HEART RATE: 85 BPM | SYSTOLIC BLOOD PRESSURE: 110 MMHG

## 2023-01-18 DIAGNOSIS — F32.A DEPRESSION, UNSPECIFIED DEPRESSION TYPE: ICD-10-CM

## 2023-01-18 PROBLEM — Z34.90 ENCOUNTER FOR INDUCTION OF LABOR: Status: RESOLVED | Noted: 2022-12-02 | Resolved: 2023-01-18

## 2023-01-18 PROCEDURE — 99207 PR POST PARTUM EXAM: CPT | Performed by: FAMILY MEDICINE

## 2023-01-18 RX ORDER — ACETAMINOPHEN AND CODEINE PHOSPHATE 120; 12 MG/5ML; MG/5ML
0.35 SOLUTION ORAL DAILY
Qty: 84 TABLET | Refills: 3 | Status: SHIPPED | OUTPATIENT
Start: 2023-01-18 | End: 2023-12-23

## 2023-01-18 ASSESSMENT — PAIN SCALES - GENERAL: PAINLEVEL: NO PAIN (0)

## 2023-01-21 DIAGNOSIS — J45.30 MILD PERSISTENT ASTHMA WITHOUT COMPLICATION: ICD-10-CM

## 2023-01-23 RX ORDER — ALBUTEROL SULFATE 90 UG/1
AEROSOL, METERED RESPIRATORY (INHALATION)
Qty: 8.5 G | Refills: 3 | Status: SHIPPED | OUTPATIENT
Start: 2023-01-23 | End: 2023-11-13

## 2023-01-29 NOTE — PROGRESS NOTES
Assessment & Plan     Routine postpartum follow-up  Patient has been doing well postpartum.  She would like to use a progesterone only pill for contraception and prescription was sent today.  Her mood has been stable as noted below.  She feels that her perineum is healing nicely, see below for further comments.  Due to her history of abnormal Pap smears, she is due for a follow-up Pap smear in about 2 months.  - norethindrone (MICRONOR) 0.35 MG tablet; Take 1 tablet (0.35 mg) by mouth daily    Depression, unspecified depression type  Doing well on current dose of sertraline.    Third degree perineal laceration during delivery, unspecified subtype  Patient appears to have a small cavity of the posterior perineum which has not yet healed.  This will likely heal by secondary intention, however I recommended recheck of her repair in about 3 weeks.  We will assist her with making this appointment.  This not painful for her.               BMI:   Estimated body mass index is 33.67 kg/m  as calculated from the following:    Height as of 12/2/22: 1.829 m (6').    Weight as of this encounter: 112.6 kg (248 lb 3.8 oz).           Return in about 3 weeks (around 2/8/2023) for Follow up mood and recheck perineal repair.    Erlinda Aguilera MD  [facility] HARSH Parisi is a 27 year old, presenting for the following health issues:  Post Partum Exam      HPI     Patient presents today for a routine postpartum visit.  She delivered a healthy infant on 12/4/2022 via vaginal delivery.  She did sustain 1/3 degree perineal tear during delivery which was repaired by Dr. Levin.  She thinks that her perineum has healed nicely.  She does not feel any pain or residual suture material.  She reports that her mood has been good.  Prior to delivery, her dose of sertraline was adjusted and she feels like this was a good change.  She will be due for repeat Pap smear in March.  She has no other questions or concerns  today.     Review of Systems   Constitutional, HEENT, cardiovascular, pulmonary, gi and gu systems are negative, except as otherwise noted.      Objective    /60   Pulse 85   Wt 112.6 kg (248 lb 3.8 oz)   LMP 02/23/2022   SpO2 95%   BMI 33.67 kg/m    Body mass index is 33.67 kg/m .  Physical Exam   GENERAL: healthy, alert and no distress  RESP: Breathing comfortably, no cough during the visit   (female): Perineum appears to have healed nicely with the exception of an approximately 0.5 cm shallow pocket just inside the vaginal introitus.  There is no drainage or surrounding erythema.  MS: no gross musculoskeletal defects noted, no edema  PSYCH: mentation appears normal, affect normal/bright    Diagnostics: None

## 2023-02-08 ENCOUNTER — OFFICE VISIT (OUTPATIENT)
Dept: FAMILY MEDICINE | Facility: CLINIC | Age: 28
End: 2023-02-08
Payer: COMMERCIAL

## 2023-02-08 VITALS
OXYGEN SATURATION: 95 % | BODY MASS INDEX: 33.44 KG/M2 | SYSTOLIC BLOOD PRESSURE: 110 MMHG | WEIGHT: 246.9 LBS | HEART RATE: 95 BPM | TEMPERATURE: 99.9 F | HEIGHT: 72 IN | DIASTOLIC BLOOD PRESSURE: 68 MMHG

## 2023-02-08 DIAGNOSIS — Z30.09 ENCOUNTER FOR OTHER GENERAL COUNSELING AND ADVICE ON CONTRACEPTION: ICD-10-CM

## 2023-02-08 DIAGNOSIS — F32.A DEPRESSION, UNSPECIFIED DEPRESSION TYPE: ICD-10-CM

## 2023-02-08 PROCEDURE — 99207 PR POST PARTUM EXAM: CPT | Performed by: FAMILY MEDICINE

## 2023-02-08 PROCEDURE — 17250 CHEM CAUT OF GRANLTJ TISSUE: CPT | Performed by: FAMILY MEDICINE

## 2023-02-08 ASSESSMENT — PAIN SCALES - GENERAL: PAINLEVEL: NO PAIN (0)

## 2023-02-08 NOTE — PROGRESS NOTES
Assessment & Plan     Third degree perineal laceration during delivery, unspecified subtype  On recheck, patient had a tiny piece of granular tissue protruding from the previously noted pit between the perineum and vaginal vault.  This was cauterized using chemical cautery with 1 stick of silver nitrate.  Patient tolerated the procedure well.  - CHEM CAUTERY GRANULATION TISSUE    Depression, unspecified depression type  Patient notes that she is doing pretty well on her current dose of sertraline.  She does not desire any changes at present.  She plans to establish with a therapist and she will let me know if her insurance requires a referral.  She does have a resource in this regard.    Encounter for other general counseling and advice on contraception  Patient is considering an IUD.  She is doing well with her current progesterone only pill.  If she would like to schedule an IUD, she will let us know.               BMI:   Estimated body mass index is 33.49 kg/m  as calculated from the following:    Height as of this encounter: 1.829 m (6').    Weight as of this encounter: 112 kg (246 lb 14.4 oz).           Return in about 2 months (around 4/8/2023) for pap smear, follow-up mood.    Erlinda Aguilera MD  [facility] HARSH Parisi is a 27 year old, presenting for the following health issues:  Prenatal Care and Follow up (postpartum)      History of Present Illness       Mental Health Follow-up:  Patient presents to follow-up on Depression & Anxiety.                She eats 0-1 servings of fruits and vegetables daily.She consumes 1 sweetened beverage(s) daily.She exercises with enough effort to increase her heart rate 9 or less minutes per day.  She exercises with enough effort to increase her heart rate 3 or less days per week. She is missing 1 dose(s) of medications per week.  She is not taking prescribed medications regularly due to remembering to take.       Patient presents today  for recheck of her perineal laceration repair.  At her postpartum visit, I had noted essentially a nonhealing pit between the perineum and the vaginal vault.  I asked her to return in about 3 weeks for recheck and so she presents for this today.  At her last visit, we also discussed her mood.  She had recently had her dose of sertraline increased.  She feels that she is doing pretty well, a little bit better now that baby is sleeping better.  She does not desire any further changes to her medication dose, plans to establish with a therapist in the near future.  She has a resource in this regard, a therapist that her  is seeing.  She will let me know if she needs a referral.  She is also considering having an IUD again for contraception.  This worked well for her in the past.  She is doing okay on her current oral contraceptive pill.  Her perineum continues to feel normally, she has no excess discharge, may be some slight tenderness in a specific area.     Review of Systems   Constitutional, HEENT, cardiovascular, pulmonary, gi and gu systems are negative, except as otherwise noted.      Objective    /68 (BP Location: Left arm, Patient Position: Sitting, Cuff Size: Adult Regular)   Pulse 95   Temp 99.9  F (37.7  C) (Temporal)   Ht 1.829 m (6')   Wt 112 kg (246 lb 14.4 oz)   LMP 02/23/2022   SpO2 95%   Breastfeeding Yes   BMI 33.49 kg/m    Body mass index is 33.49 kg/m .  Physical Exam   GENERAL: healthy, alert and no distress  RESP: Breathing comfortably, no cough during the visit   (female): Perineum appears to have healed nicely.  Evidence of the previously noted pit is still present, however well-healed with the exception of a tiny approximately 1 x 4 mm elongated red piece of granulation tissue at the center of this pit.  NEURO: Normal strength and tone, mentation intact and speech normal  PSYCH: mentation appears normal, affect normal/bright    Diagnostics:  EPDS = 13

## 2023-03-03 ENCOUNTER — PATIENT OUTREACH (OUTPATIENT)
Dept: FAMILY MEDICINE | Facility: CLINIC | Age: 28
End: 2023-03-03

## 2023-03-03 PROBLEM — Z87.42 HX OF ABNORMAL CERVICAL PAP SMEAR: Status: ACTIVE | Noted: 2022-03-30

## 2023-03-03 NOTE — LETTER
March 3, 2023      Ailin Calhoun  [address] [address]        Dear ,    This letter is to remind you that you are due for your follow-up Pap smear and Human Papillomavirus (HPV) test.    Please call [phone] to schedule your appointment at your earliest convenience.    If you have completed the appointment outside of the [facility] system, please have the records forwarded to our office. We will update your chart for your provider to review before your next annual wellness visit.     Thank you for choosing [facility]!      Sincerely,    Your [facility] Care Team

## 2023-03-16 ENCOUNTER — MEDICAL CORRESPONDENCE (OUTPATIENT)
Dept: HEALTH INFORMATION MANAGEMENT | Facility: CLINIC | Age: 28
End: 2023-03-16

## 2023-03-16 DIAGNOSIS — F32.A DEPRESSION, UNSPECIFIED DEPRESSION TYPE: ICD-10-CM

## 2023-03-16 RX ORDER — SERTRALINE HYDROCHLORIDE 25 MG/1
TABLET, FILM COATED ORAL
Qty: 90 TABLET | Refills: 3 | OUTPATIENT
Start: 2023-03-16

## 2023-03-16 NOTE — TELEPHONE ENCOUNTER
Sertraline 50 mg refilled 1/12/23 #90 with 1 RF. Should have refills on file.   MARJ SINGLETARY RN  [facility] nurse advisors  3/16/2023  2:04 PM

## 2023-04-27 NOTE — TELEPHONE ENCOUNTER
Saúl Calvo,   Patient is lost to pap tracking follow-up. Attempts to contact pt have been made per reminder process and there has been no reply and/or no appt scheduled.  If you are wanting any additional contact attempts please send to your care team staff.     Pap Hx:  Hx of abnormal pap smear: yes, one year ago colposcopy which she says was normal. No records to review.    03/23/22 NIL Pap, Neg HR HPV Plan cotest in 1 year due 03/23/23.   03/3/23 Reminder Cailint  04/6/23 Reminder call-lm  04/27/23 Lost to follow-up for pap tracking, lita routed to provider

## 2023-05-20 ENCOUNTER — HEALTH MAINTENANCE LETTER (OUTPATIENT)
Age: 28
End: 2023-05-20

## 2023-10-09 NOTE — PROGRESS NOTES
Assessment/Plan:   Ailin is a 28 year old female here for physical     Routine adult health maintenance  Physical completed today. No labs done today. Up to date with immunizations. Pap smear done today.    Mild persistent asthma without complication  Will restart singulair to manage her asthma symptoms.   - montelukast (SINGULAIR) 10 MG tablet  Dispense: 90 tablet; Refill: 3    Depression, unspecified depression type  Will cross taper off sertraline and start celexa. Will increase dose as needed.   - citalopram (CELEXA) 10 MG tablet  Dispense: 90 tablet; Refill: 1    Cervical cancer screening  Pap smear completed today.   - Pap Screen reflex to HPV if ASCUS - recommend age 25 - 29  - HPV Hold (Lab Only)       I have had an Advance Directives discussion with the patient.  The following high BMI interventions were performed this visit: encouragement to exercise and lifestyle education regarding diet    Follow up: 1 year for physical, sooner as needed    Lainey Tillman MD  [facility]      Subjective:     Ailin Calhoun is a 28 year old female who presents for an annual exam.     Answers submitted by the patient for this visit:  Annual Preventive Visit (Submitted on 10/11/2023)  Chief Complaint: Annual Exam:  Frequency of exercise:: 2-3 days/week  Getting at least 3 servings of Calcium per day:: NO  Diet:: Regular (no restrictions)  Taking medications regularly:: Yes  Medication side effects:: None  Bi-annual eye exam:: Yes  Dental care twice a year:: NO  Sleep apnea or symptoms of sleep apnea:: None  abdominal pain: No  Blood in stool: No  Blood in urine: No  chest pain: No  chills: No  congestion: No  constipation: No  cough: No  diarrhea: No  dizziness: No  ear pain: No  eye pain: No  nervous/anxious: No  fever: No  frequency: No  genital sores: No  headaches: No  hearing loss: No  heartburn: No  arthralgias: No  joint swelling: No  peripheral edema: No  mood changes: No  myalgias: No  nausea:  No  dysuria: No  palpitations: No  Skin sensation changes: No  sore throat: No  urgency: No  rash: No  shortness of breath: No  visual disturbance: No  weakness: No  pelvic pain: No  vaginal bleeding: No  vaginal discharge: No  tenderness: No  breast mass: No  breast discharge: No  Additional concerns today:: No  Exercise outside of work (Submitted on 10/11/2023)  Chief Complaint: Annual Exam:  Duration of exercise:: 15-30 minutes    Wants to restart singulair  Interested in changing depression medication    Health Maintenance reviewed:  Lipid Profile: no  Glucose Screen: no  Colonoscopy: no  Mammogram: no    Gynecologic History  Patient's last menstrual period was 09/20/2023 (exact date).  Contraception: oral contraceptive - thinking IUD in Dec  Last Pap: 2022. Results were: nml but given hx abnl recommended repeat this year    Immunization History   Administered Date(s) Administered    Influenza Vaccine >6 months (Alfuria,Fluzone) 11/09/2022    TDAP (Adacel,Boostrix) 09/28/2022     Immunization status: up to date and documented.    Current Outpatient Medications   Medication Sig Dispense Refill    albuterol (PROAIR HFA/PROVENTIL HFA/VENTOLIN HFA) 108 (90 Base) MCG/ACT inhaler INHALE 2 PUFFS INTO THE LUNGS EVERY 6 HOURS AS NEEDED FOR WHEEZING OR SHORTNESS OF BREATH OR DIFFICULT BREATHING 8.5 g 3    albuterol (PROVENTIL) (2.5 MG/3ML) 0.083% neb solution Take 2 vials (5 mg) by nebulization every 6 hours as needed for shortness of breath / dyspnea or wheezing 75 mL 0    citalopram (CELEXA) 10 MG tablet Take 1 tablet (10 mg) by mouth daily 90 tablet 1    montelukast (SINGULAIR) 10 MG tablet Take 1 tablet (10 mg) by mouth at bedtime 90 tablet 3    norethindrone (MICRONOR) 0.35 MG tablet Take 1 tablet (0.35 mg) by mouth daily 84 tablet 3     Past Medical History:   Diagnosis Date    Asthma     Depressive disorder     Third-stage postpartum hemorrhage 12/4/2022    Total 850 ml 3 degree tear, started iron supplement ,  postpartum hemorrhage protocol started      Past Surgical History:   Procedure Laterality Date    HEMILAMINECTOMY, DISCECTOMY LUMBAR ONE LEVEL, COMBINED N/A 9/17/2022    Procedure: HEMILAMINECTOMY, SPINE, LUMBAR, 1 LEVEL, WITH DISCECTOMY;  Surgeon: Barrie Owen MD;  Location: [facility] OR    LAMINECTOMY LUMBAR ONE LEVEL N/A 9/17/2022    Procedure: LAMINECTOMY, SPINE, LUMBAR, 1 LEVEL;  Surgeon: Barrie Owen MD;  Location: [facility] OR     Aspirin, Ibuprofen, Aspirin, and Ibuprofen  Family History   Problem Relation Age of Onset    Depression Mother     Obesity Mother     Anxiety Disorder Mother     No Known Problems Father     Depression Sister     Anxiety Disorder Sister     Asthma Sister     Anxiety Disorder Maternal Grandmother     Depression Maternal Grandmother     Obesity Maternal Grandmother     No Known Problems Maternal Grandfather     No Known Problems Paternal Grandmother     No Known Problems Paternal Grandfather      Social History     Socioeconomic History    Marital status:      Spouse name: Not on file    Number of children: Not on file    Years of education: Not on file    Highest education level: Not on file   Occupational History    Not on file   Tobacco Use    Smoking status: Never    Smokeless tobacco: Never   Vaping Use    Vaping Use: Never used   Substance and Sexual Activity    Alcohol use: Not Currently    Drug use: Never    Sexual activity: Yes     Partners: Female   Other Topics Concern    Not on file   Social History Narrative    ** Merged History Encounter **          , No children  Nilton Pantoja MD         Social Determinants of Health     Financial Resource Strain: Low Risk  (10/11/2023)    Financial Resource Strain     Within the past 12 months, have you or your family members you live with been unable to get utilities (heat, electricity) when it was really needed?: No   Food Insecurity: Low Risk  (10/11/2023)    Food Insecurity     Within the past 12  "months, did you worry that your food would run out before you got money to buy more?: No     Within the past 12 months, did the food you bought just not last and you didn t have money to get more?: No   Transportation Needs: Low Risk  (10/11/2023)    Transportation Needs     Within the past 12 months, has lack of transportation kept you from medical appointments, getting your medicines, non-medical meetings or appointments, work, or from getting things that you need?: No   Physical Activity: Not on file   Stress: Not on file   Social Connections: Not on file   Interpersonal Safety: Low Risk  (10/12/2023)    Interpersonal Safety     Do you feel physically and emotionally safe where you currently live?: Yes     Within the past 12 months, have you been hit, slapped, kicked or otherwise physically hurt by someone?: No     Within the past 12 months, have you been humiliated or emotionally abused in other ways by your partner or ex-partner?: No   Housing Stability: Low Risk  (10/11/2023)    Housing Stability     Do you have housing? : Yes     Are you worried about losing your housing?: No       Review of Systems negative unless noted    Objective:        Vitals:    10/12/23 1238   BP: 124/78   Pulse: 82   Resp: 14   Temp: 97.4  F (36.3  C)   TempSrc: Temporal   SpO2: 96%   Weight: 120.9 kg (266 lb 9.6 oz)   Height: 1.836 m (6' 0.28\")   PainSc: No Pain (0)     Body mass index is 35.87 kg/m .    Physical Exam:  General Appearance: Alert, pleasant, appears stated age  Head: Normocephalic, without obvious abnormality  Eyes: PERRL, conjunctiva/corneas clear, EOM's intact  Ears: Normal TM's and external ear canals, both ears  Nose: Nares normal, septum midline,mucosa normal, no drainage  Throat: Lips, mucosa, and tongue normal; teeth and gums normal; oropharynx is clear  Neck: Supple,without lymphadenopathy, no thyromegaly or nodules noted  Lungs: Clear to auscultation bilaterally, respirations unlabored, no wheezing or " crackles  Heart: Regular rate and rhythm, no murmur   Breast:  Normal appearance.  No palpable masses, nipple discharge, or skin changes  Abdomen: Soft, non-tender, no masses, no organomegaly  Extremities: Extremities with strong and symmetric pulses, no cyanosis or edema  Skin: Skin color, texture normal, no rashes or lesions  Neurologic: Grossly normal, no focal deficits  Pelvic exam: normal external genitalia, normal appearing cervix, no discharge

## 2023-10-11 ASSESSMENT — ENCOUNTER SYMPTOMS
HEADACHES: 0
ABDOMINAL PAIN: 0
JOINT SWELLING: 0
BREAST MASS: 0
EYE PAIN: 0
SORE THROAT: 0
DIARRHEA: 0
PALPITATIONS: 0
FREQUENCY: 0
NAUSEA: 0
HEARTBURN: 0
WEAKNESS: 0
NERVOUS/ANXIOUS: 0
HEMATURIA: 0
CHILLS: 0
PARESTHESIAS: 0
MYALGIAS: 0
DYSURIA: 0
COUGH: 0
HEMATOCHEZIA: 0
DIZZINESS: 0
FEVER: 0
ARTHRALGIAS: 0
CONSTIPATION: 0
SHORTNESS OF BREATH: 0

## 2023-10-11 ASSESSMENT — ASTHMA QUESTIONNAIRES: ACT_TOTALSCORE: 14

## 2023-10-12 ENCOUNTER — OFFICE VISIT (OUTPATIENT)
Dept: FAMILY MEDICINE | Facility: CLINIC | Age: 28
End: 2023-10-12

## 2023-10-12 VITALS
RESPIRATION RATE: 14 BRPM | DIASTOLIC BLOOD PRESSURE: 78 MMHG | HEIGHT: 72 IN | SYSTOLIC BLOOD PRESSURE: 124 MMHG | BODY MASS INDEX: 36.11 KG/M2 | OXYGEN SATURATION: 96 % | WEIGHT: 266.6 LBS | TEMPERATURE: 97.4 F | HEART RATE: 82 BPM

## 2023-10-12 DIAGNOSIS — F32.A DEPRESSION, UNSPECIFIED DEPRESSION TYPE: ICD-10-CM

## 2023-10-12 DIAGNOSIS — Z12.4 CERVICAL CANCER SCREENING: ICD-10-CM

## 2023-10-12 DIAGNOSIS — Z00.00 ROUTINE ADULT HEALTH MAINTENANCE: Primary | ICD-10-CM

## 2023-10-12 DIAGNOSIS — J45.30 MILD PERSISTENT ASTHMA WITHOUT COMPLICATION: ICD-10-CM

## 2023-10-12 PROCEDURE — 99395 PREV VISIT EST AGE 18-39: CPT | Performed by: FAMILY MEDICINE

## 2023-10-12 PROCEDURE — G0145 SCR C/V CYTO,THINLAYER,RESCR: HCPCS | Performed by: FAMILY MEDICINE

## 2023-10-12 PROCEDURE — 87624 HPV HI-RISK TYP POOLED RSLT: CPT | Performed by: FAMILY MEDICINE

## 2023-10-12 RX ORDER — MONTELUKAST SODIUM 10 MG/1
10 TABLET ORAL AT BEDTIME
Qty: 90 TABLET | Refills: 3 | Status: SHIPPED | OUTPATIENT
Start: 2023-10-12

## 2023-10-12 RX ORDER — CITALOPRAM HYDROBROMIDE 10 MG/1
10 TABLET ORAL DAILY
Qty: 90 TABLET | Refills: 1 | Status: SHIPPED | OUTPATIENT
Start: 2023-10-12 | End: 2024-03-13

## 2023-10-12 ASSESSMENT — PAIN SCALES - GENERAL: PAINLEVEL: NO PAIN (0)

## 2023-10-12 NOTE — PATIENT INSTRUCTIONS
Cross taper off sertraline and onto citalopram:  -decrease sertraline to 1/2 pill (25mg daily) for 1 week and start citalopram 1/2 tab (5mg) daily   -then stop sertraline and increase citalopram to 1 tab (10 mg daily)  -can increase citalopram to 20mg daily in 2-4 weeks needed

## 2023-10-17 LAB
BKR LAB AP GYN ADEQUACY: NORMAL
BKR LAB AP GYN INTERPRETATION: NORMAL
BKR LAB AP HPV REFLEX: NORMAL
BKR LAB AP LMP: NORMAL
BKR LAB AP PREVIOUS ABNL DX: NORMAL
BKR LAB AP PREVIOUS ABNORMAL: NORMAL
PATH REPORT.COMMENTS IMP SPEC: NORMAL
PATH REPORT.COMMENTS IMP SPEC: NORMAL
PATH REPORT.RELEVANT HX SPEC: NORMAL

## 2023-11-11 DIAGNOSIS — J45.30 MILD PERSISTENT ASTHMA WITHOUT COMPLICATION: ICD-10-CM

## 2023-11-13 RX ORDER — ALBUTEROL SULFATE 90 UG/1
AEROSOL, METERED RESPIRATORY (INHALATION)
Qty: 8.5 G | Refills: 3 | Status: SHIPPED | OUTPATIENT
Start: 2023-11-13 | End: 2024-04-09

## 2023-12-15 RX ORDER — ACETAMINOPHEN AND CODEINE PHOSPHATE 120; 12 MG/5ML; MG/5ML
0.35 SOLUTION ORAL DAILY
Qty: 84 TABLET | Refills: 3 | OUTPATIENT
Start: 2023-12-15

## 2023-12-23 ENCOUNTER — MYC REFILL (OUTPATIENT)
Dept: FAMILY MEDICINE | Facility: CLINIC | Age: 28
End: 2023-12-23

## 2023-12-27 RX ORDER — ACETAMINOPHEN AND CODEINE PHOSPHATE 120; 12 MG/5ML; MG/5ML
0.35 SOLUTION ORAL DAILY
Qty: 84 TABLET | Refills: 2 | Status: SHIPPED | OUTPATIENT
Start: 2023-12-27 | End: 2024-07-31

## 2024-01-20 DIAGNOSIS — F32.A DEPRESSION, UNSPECIFIED DEPRESSION TYPE: ICD-10-CM

## 2024-01-22 RX ORDER — CITALOPRAM HYDROBROMIDE 10 MG/1
10 TABLET ORAL DAILY
Qty: 90 TABLET | Refills: 1 | OUTPATIENT
Start: 2024-01-22

## 2024-03-11 NOTE — PROGRESS NOTES
Ailin is a 28 year old who is being evaluated via a billable video visit.      How would you like to obtain your AVS? MyChart  If the video visit is dropped, the invitation should be resent by: Text to cell phone: [phone]  Will anyone else be joining your video visit? No      Assessment/Plan:    Current moderate episode of major depressive disorder without prior episode (H)  Anxiety  Uncontrolled anxiety and depression symptoms, will increase celexa to 20mg daily - will continue this for 2-4 weeks and then can increase to 30mg daily if needed. Will start hydroxyzine at bedtime. Pt will call to set up therapy.   - citalopram (CELEXA) 20 MG tablet  Dispense: 90 tablet; Refill: 1  - hydrOXYzine HCl (ATARAX) 25 MG tablet  Dispense: 90 tablet; Refill: 1    Inattention  Pt with a family hx of ADHD, pt having issues with possible ADHD symptoms - discussed possibly related to uncontrolled anxiety but referral placed for ADHD testing.   - Adult Mental Health  Referral    Large breasts  Neck pain  Chronic back pain, unspecified back location, unspecified back pain laterality  Pt with large breasts that are causing issues with pain in neck and back, referral placed to plastic surgery for possible breast reduction surgery.  - Adult Plastic Surgery  Referral     Follow up: 1-3 months     Lainey Tillman MD  [facility]      Subjective:   Ailin Calhoun is a 28 year old female being seen today via video visit for multiple concerns    -on celexa  -feels like anxiety is through the roof - doesn't feel like it's related to 's issues - can't sleep, can't focus (typical things aren't working for sleep anymore)  -planning to restart therapy  -both sisters have ADHD - pt wondering if she has it too    -breast reduction - has always had issues with neck/back pain (upper and mid), feels like slouching because of these - hard to exercise - bra size 44DDD    Answers submitted by the patient  for this visit:  BETH-7 (Submitted on 3/12/2024)  BETH 7 TOTAL SCORE: 20  Depression / Anxiety Questionnaire (Submitted on 3/12/2024)  Chief Complaint: Chronic problems general questions HPI Form  Depression/Anxiety: Depression & Anxiety  Depression & Anxiety (Submitted on 3/12/2024)  Chief Complaint: Chronic problems general questions HPI Form  Status since last visit:: bad  Anxiety since last: : worse  Other associated symptoms of depression:: Yes  Other associated symotome: : Yes  Significant life event: : relationship concerns  Anxious:: Yes  Current substance use:: No  Back Pain Visit Questionnaire (Submitted on 3/12/2024)  Your back pain is: recurring  Chronic or Recurring Back Pain Visit Questionnaire (Submitted on 3/12/2024)  Where is your back pain located? : right middle of back, left middle of back, right upper back, left upper back  How would you describe your back pain? : dull ache, sharp, stabbing  Where does your back pain spread? : nowhere  Since you noticed your back pain, how has it changed? : always present, but gets better and worse  Does your back pain interfere with your job?: No  General Questionnaire (Submitted on 3/12/2024)  Chief Complaint: Chronic problems general questions HPI Form  How many servings of fruits and vegetables do you eat daily?: 0-1  On average, how many sweetened beverages do you drink each day (Examples: soda, juice, sweet tea, etc.  Do NOT count diet or artificially sweetened beverages)?: 1  How many minutes a day do you exercise enough to make your heart beat faster?: 20 to 29  How many days a week do you exercise enough to make your heart beat faster?: 3 or less  How many days per week do you miss taking your medication?: 0    Patient Active Problem List   Diagnosis    Mild persistent asthma without complication    Midline low back pain with left-sided sciatica, unspecified chronicity    Depression, unspecified depression type    Insomnia, unspecified type    Hx of  abnormal cervical Pap smear    Heartburn    Lumbar herniated disc    Third degree perineal laceration during delivery, unspecified subtype     Past Medical History:   Diagnosis Date    Asthma     Depressive disorder     Third-stage postpartum hemorrhage 12/4/2022    Total 850 ml 3 degree tear, started iron supplement , postpartum hemorrhage protocol started      Past Surgical History:   Procedure Laterality Date    HEMILAMINECTOMY, DISCECTOMY LUMBAR ONE LEVEL, COMBINED N/A 9/17/2022    Procedure: HEMILAMINECTOMY, SPINE, LUMBAR, 1 LEVEL, WITH DISCECTOMY;  Surgeon: Barrie Owen MD;  Location: [facility] OR    LAMINECTOMY LUMBAR ONE LEVEL N/A 9/17/2022    Procedure: LAMINECTOMY, SPINE, LUMBAR, 1 LEVEL;  Surgeon: Barrie Owen MD;  Location: [facility] OR     Current Outpatient Medications   Medication    citalopram (CELEXA) 20 MG tablet    hydrOXYzine HCl (ATARAX) 25 MG tablet    albuterol (PROAIR HFA/PROVENTIL HFA/VENTOLIN HFA) 108 (90 Base) MCG/ACT inhaler    albuterol (PROVENTIL) (2.5 MG/3ML) 0.083% neb solution    montelukast (SINGULAIR) 10 MG tablet    norethindrone (MICRONOR) 0.35 MG tablet     No current facility-administered medications for this visit.     Allergies   Allergen Reactions    Aspirin Difficulty breathing, Hives, Itching, Nausea, Nausea and Vomiting and Shortness Of Breath    Ibuprofen Difficulty breathing, Fatigue, Headache, Itching, Nausea, Nausea and Vomiting and Shortness Of Breath    Aspirin     Ibuprofen      Social History     Socioeconomic History    Marital status:      Spouse name: Not on file    Number of children: Not on file    Years of education: Not on file    Highest education level: Not on file   Occupational History    Not on file   Tobacco Use    Smoking status: Never    Smokeless tobacco: Never   Vaping Use    Vaping Use: Never used   Substance and Sexual Activity    Alcohol use: Not Currently    Drug use: Never    Sexual activity: Yes     Partners: Female   Other  Topics Concern    Not on file   Social History Narrative    ** Merged History Encounter **          , No children  Nilton Pantoja MD         Social Determinants of Health     Financial Resource Strain: Low Risk  (10/11/2023)    Financial Resource Strain     Within the past 12 months, have you or your family members you live with been unable to get utilities (heat, electricity) when it was really needed?: No   Food Insecurity: Low Risk  (10/11/2023)    Food Insecurity     Within the past 12 months, did you worry that your food would run out before you got money to buy more?: No     Within the past 12 months, did the food you bought just not last and you didn t have money to get more?: No   Transportation Needs: Low Risk  (10/11/2023)    Transportation Needs     Within the past 12 months, has lack of transportation kept you from medical appointments, getting your medicines, non-medical meetings or appointments, work, or from getting things that you need?: No   Physical Activity: Not on file   Stress: Not on file   Social Connections: Not on file   Interpersonal Safety: Low Risk  (10/12/2023)    Interpersonal Safety     Do you feel physically and emotionally safe where you currently live?: Yes     Within the past 12 months, have you been hit, slapped, kicked or otherwise physically hurt by someone?: No     Within the past 12 months, have you been humiliated or emotionally abused in other ways by your partner or ex-partner?: No   Housing Stability: Low Risk  (10/11/2023)    Housing Stability     Do you have housing? : Yes     Are you worried about losing your housing?: No     Family History   Problem Relation Age of Onset    Depression Mother     Obesity Mother     Anxiety Disorder Mother     No Known Problems Father     Depression Sister     Anxiety Disorder Sister     Asthma Sister     Anxiety Disorder Maternal Grandmother     Depression Maternal Grandmother     Obesity Maternal Grandmother     No Known  Problems Maternal Grandfather     No Known Problems Paternal Grandmother     No Known Problems Paternal Grandfather      Review of systems is as stated in HPI, and the remainder of system review is otherwise negative.    Objective:     There were no vitals taken for this visit.    General appearance: awake, NAD  HEENT: atraumatic, normocephalic  Lungs: breathing comfortably on room air, no cough  Neuro: alert, oriented x3, CNs grossly intact, no focal deficits appreciated  Psych: normal mood/affect/behavior, answering questions appropriately, linear thought process    Video-Visit Details    Type of service:  Video Visit   Start time: 4:50PM  End time: 5:10PM  Originating Location (pt. Location): Home  Distant Location (provider location):  On-site  Platform used for Video Visit: Clarence  Signed Electronically by: Lainey Tillman MD

## 2024-03-12 ASSESSMENT — ASTHMA QUESTIONNAIRES
ACT_TOTALSCORE: 21
QUESTION_2 LAST FOUR WEEKS HOW OFTEN HAVE YOU HAD SHORTNESS OF BREATH: ONCE OR TWICE A WEEK
QUESTION_1 LAST FOUR WEEKS HOW MUCH OF THE TIME DID YOUR ASTHMA KEEP YOU FROM GETTING AS MUCH DONE AT WORK, SCHOOL OR AT HOME: A LITTLE OF THE TIME
QUESTION_4 LAST FOUR WEEKS HOW OFTEN HAVE YOU USED YOUR RESCUE INHALER OR NEBULIZER MEDICATION (SUCH AS ALBUTEROL): ONCE A WEEK OR LESS
QUESTION_5 LAST FOUR WEEKS HOW WOULD YOU RATE YOUR ASTHMA CONTROL: WELL CONTROLLED
ACT_TOTALSCORE: 21
QUESTION_3 LAST FOUR WEEKS HOW OFTEN DID YOUR ASTHMA SYMPTOMS (WHEEZING, COUGHING, SHORTNESS OF BREATH, CHEST TIGHTNESS OR PAIN) WAKE YOU UP AT NIGHT OR EARLIER THAN USUAL IN THE MORNING: NOT AT ALL

## 2024-03-12 ASSESSMENT — ANXIETY QUESTIONNAIRES
1. FEELING NERVOUS, ANXIOUS, OR ON EDGE: NEARLY EVERY DAY
7. FEELING AFRAID AS IF SOMETHING AWFUL MIGHT HAPPEN: NEARLY EVERY DAY
7. FEELING AFRAID AS IF SOMETHING AWFUL MIGHT HAPPEN: NEARLY EVERY DAY
GAD7 TOTAL SCORE: 20
GAD7 TOTAL SCORE: 20
8. IF YOU CHECKED OFF ANY PROBLEMS, HOW DIFFICULT HAVE THESE MADE IT FOR YOU TO DO YOUR WORK, TAKE CARE OF THINGS AT HOME, OR GET ALONG WITH OTHER PEOPLE?: VERY DIFFICULT
IF YOU CHECKED OFF ANY PROBLEMS ON THIS QUESTIONNAIRE, HOW DIFFICULT HAVE THESE PROBLEMS MADE IT FOR YOU TO DO YOUR WORK, TAKE CARE OF THINGS AT HOME, OR GET ALONG WITH OTHER PEOPLE: VERY DIFFICULT
2. NOT BEING ABLE TO STOP OR CONTROL WORRYING: NEARLY EVERY DAY
GAD7 TOTAL SCORE: 20
3. WORRYING TOO MUCH ABOUT DIFFERENT THINGS: NEARLY EVERY DAY
4. TROUBLE RELAXING: NEARLY EVERY DAY
6. BECOMING EASILY ANNOYED OR IRRITABLE: MORE THAN HALF THE DAYS
5. BEING SO RESTLESS THAT IT IS HARD TO SIT STILL: NEARLY EVERY DAY

## 2024-03-13 ENCOUNTER — VIRTUAL VISIT (OUTPATIENT)
Dept: FAMILY MEDICINE | Facility: CLINIC | Age: 29
End: 2024-03-13
Payer: COMMERCIAL

## 2024-03-13 DIAGNOSIS — F41.9 ANXIETY: ICD-10-CM

## 2024-03-13 DIAGNOSIS — G89.29 CHRONIC BACK PAIN, UNSPECIFIED BACK LOCATION, UNSPECIFIED BACK PAIN LATERALITY: ICD-10-CM

## 2024-03-13 DIAGNOSIS — R41.840 INATTENTION: ICD-10-CM

## 2024-03-13 DIAGNOSIS — M54.9 CHRONIC BACK PAIN, UNSPECIFIED BACK LOCATION, UNSPECIFIED BACK PAIN LATERALITY: ICD-10-CM

## 2024-03-13 DIAGNOSIS — F32.1 CURRENT MODERATE EPISODE OF MAJOR DEPRESSIVE DISORDER WITHOUT PRIOR EPISODE (H): Primary | ICD-10-CM

## 2024-03-13 DIAGNOSIS — M54.2 NECK PAIN: ICD-10-CM

## 2024-03-13 DIAGNOSIS — N62 LARGE BREASTS: ICD-10-CM

## 2024-03-13 PROCEDURE — 99214 OFFICE O/P EST MOD 30 MIN: CPT | Mod: 95 | Performed by: FAMILY MEDICINE

## 2024-03-13 RX ORDER — HYDROXYZINE HYDROCHLORIDE 25 MG/1
25-100 TABLET, FILM COATED ORAL
Qty: 90 TABLET | Refills: 1 | Status: SHIPPED | OUTPATIENT
Start: 2024-03-13

## 2024-03-13 RX ORDER — CITALOPRAM HYDROBROMIDE 20 MG/1
20 TABLET ORAL DAILY
Qty: 90 TABLET | Refills: 1 | Status: SHIPPED | OUTPATIENT
Start: 2024-03-13 | End: 2024-05-01

## 2024-04-09 DIAGNOSIS — J45.30 MILD PERSISTENT ASTHMA WITHOUT COMPLICATION: ICD-10-CM

## 2024-04-09 RX ORDER — ALBUTEROL SULFATE 90 UG/1
AEROSOL, METERED RESPIRATORY (INHALATION)
Qty: 8.5 G | Refills: 0 | Status: SHIPPED | OUTPATIENT
Start: 2024-04-09 | End: 2024-07-18

## 2024-04-30 ENCOUNTER — MYC MEDICAL ADVICE (OUTPATIENT)
Dept: FAMILY MEDICINE | Facility: CLINIC | Age: 29
End: 2024-04-30
Payer: COMMERCIAL

## 2024-04-30 DIAGNOSIS — F41.9 ANXIETY: ICD-10-CM

## 2024-04-30 DIAGNOSIS — F32.A DEPRESSION, UNSPECIFIED DEPRESSION TYPE: ICD-10-CM

## 2024-04-30 DIAGNOSIS — F32.1 CURRENT MODERATE EPISODE OF MAJOR DEPRESSIVE DISORDER WITHOUT PRIOR EPISODE (H): ICD-10-CM

## 2024-05-01 RX ORDER — CITALOPRAM HYDROBROMIDE 20 MG/1
20 TABLET ORAL DAILY
Qty: 90 TABLET | Refills: 1 | Status: SHIPPED | OUTPATIENT
Start: 2024-05-01 | End: 2024-10-02

## 2024-05-01 RX ORDER — CITALOPRAM HYDROBROMIDE 10 MG/1
10 TABLET ORAL DAILY
Qty: 90 TABLET | Refills: 1 | Status: SHIPPED | OUTPATIENT
Start: 2024-05-01

## 2024-05-01 NOTE — TELEPHONE ENCOUNTER
"Per Virtual Visit on 3/13/24:  \"Current moderate episode of major depressive disorder without prior episode (H)  Anxiety  Uncontrolled anxiety and depression symptoms, will increase celexa to 20mg daily - will continue this for 2-4 weeks and then can increase to 30mg daily if needed. Will start hydroxyzine at bedtime. Pt will call to set up therapy.\"    Rx pended for citalopram 10 mg as there should be additional refill at pharmacy for the 20 mg dose.    Routing to refill pool.    Tahir Dodson, MARCN, RN  [facility]    "

## 2024-05-01 NOTE — TELEPHONE ENCOUNTER
Routing refill request to provider for review/approval because:  PHQ-9 not current:        1/13/2022    12:36 PM   PHQ   PHQ-9 Total Score 0   Q9: Thoughts of better off dead/self-harm past 2 weeks Not at all        Pending Prescriptions:                       Disp   Refills    citalopram (CELEXA) 10 MG tablet           90 tab*1        Sig: Take 1 tablet (10 mg) by mouth daily    Last office visitwith prescribing provider: 10/12/2023   Future Office Visit:   Appointments in Next Year      May 30, 2024  2:30 PM  (Arrive by 2:15 PM)  New Plastic Surgery with Remington Tay MD  [facility] [place] ([facility] ) [phone]            MARC MedelN, RN  [facility]

## 2024-05-13 NOTE — PATIENT INSTRUCTIONS
Phone Numbers:  [facility]: [phone]  [facility]: [phone]     When to call or come in to the hospital  If you notice a decrease in your baby's movement.   If your begin to experience contractions that are 5 minutes or less apart and lasting for over 45 seconds, or if contractions are becoming more painful.  If you have any bleeding or leakage of fluids.   If you have a headache not resolved with tylenol, right upper abdominal pain, or sudden onset of swelling.  You know your body best. Never hesitate to call or go to the hospital if something doesn't feel right!    Preparing for the hospital  You re likely feeling anxious as your child s birth approaches. This is normal. To give yourself some peace of mind, pack a bag 3-4 weeks before your due date. Here is a list of things to remember:  Personal care items like toothbrush, hair brush, lip balm, lotion, shampoo, glasses, contacts  Nightgown, bathrobe, slippers  Several pairs of underwear  Comfortable clothes for you to wear home  Camera with new batteries  Cell phone and   Insurance information and any other paperwork needed for your hospital stay  Clothes for baby to wear home  An infant, rear-facing car seat for bringing home your baby (this is required by law)    Negative

## 2024-07-01 ENCOUNTER — MYC REFILL (OUTPATIENT)
Dept: FAMILY MEDICINE | Facility: CLINIC | Age: 29
End: 2024-07-01
Payer: COMMERCIAL

## 2024-07-01 DIAGNOSIS — F32.1 CURRENT MODERATE EPISODE OF MAJOR DEPRESSIVE DISORDER WITHOUT PRIOR EPISODE (H): ICD-10-CM

## 2024-07-01 DIAGNOSIS — F41.9 ANXIETY: ICD-10-CM

## 2024-07-02 RX ORDER — CITALOPRAM HYDROBROMIDE 20 MG/1
20 TABLET ORAL DAILY
Qty: 90 TABLET | Refills: 1 | OUTPATIENT
Start: 2024-07-02

## 2024-07-08 NOTE — TELEPHONE ENCOUNTER
FUTURE VISIT INFORMATION      FUTURE VISIT INFORMATION:  Date: 10/1/24  Time: 10:00am  Location: [facility]  REFERRAL INFORMATION:  Referring provider:  Lainey Tillman MD   Referring providers clinic:  [facility]   Reason for visit/diagnosis  breast reduction    RECORDS REQUESTED FROM:       Clinic name Comments Records Status Imaging Status   [facility]  Virtual visit/referral 3/13/24 EPIC

## 2024-07-18 DIAGNOSIS — J45.30 MILD PERSISTENT ASTHMA WITHOUT COMPLICATION: ICD-10-CM

## 2024-07-18 RX ORDER — ALBUTEROL SULFATE 90 UG/1
AEROSOL, METERED RESPIRATORY (INHALATION)
Qty: 8.5 G | Refills: 0 | Status: SHIPPED | OUTPATIENT
Start: 2024-07-18 | End: 2024-08-29

## 2024-07-26 DIAGNOSIS — J45.30 MILD PERSISTENT ASTHMA WITHOUT COMPLICATION: ICD-10-CM

## 2024-07-29 RX ORDER — ACETAMINOPHEN AND CODEINE PHOSPHATE 120; 12 MG/5ML; MG/5ML
0.35 SOLUTION ORAL DAILY
Qty: 84 TABLET | Refills: 2 | OUTPATIENT
Start: 2024-07-29

## 2024-07-29 RX ORDER — MONTELUKAST SODIUM 10 MG/1
1 TABLET ORAL AT BEDTIME
Qty: 90 TABLET | Refills: 3 | OUTPATIENT
Start: 2024-07-29

## 2024-07-31 RX ORDER — ACETAMINOPHEN AND CODEINE PHOSPHATE 120; 12 MG/5ML; MG/5ML
0.35 SOLUTION ORAL DAILY
Qty: 84 TABLET | Refills: 2 | Status: SHIPPED | OUTPATIENT
Start: 2024-07-31

## 2024-08-29 DIAGNOSIS — J45.30 MILD PERSISTENT ASTHMA WITHOUT COMPLICATION: ICD-10-CM

## 2024-08-29 RX ORDER — ALBUTEROL SULFATE 90 UG/1
AEROSOL, METERED RESPIRATORY (INHALATION)
Qty: 8.5 G | Refills: 0 | Status: SHIPPED | OUTPATIENT
Start: 2024-08-29

## 2024-09-08 ENCOUNTER — HOSPITAL ENCOUNTER (EMERGENCY)
Facility: HOSPITAL | Age: 29
Discharge: HOME | End: 2024-09-08
Attending: EMERGENCY MEDICINE | Admitting: EMERGENCY MEDICINE
Payer: COMMERCIAL

## 2024-09-08 VITALS
BODY MASS INDEX: 35.49 KG/M2 | RESPIRATION RATE: 16 BRPM | HEART RATE: 92 BPM | DIASTOLIC BLOOD PRESSURE: 88 MMHG | WEIGHT: 262 LBS | OXYGEN SATURATION: 97 % | SYSTOLIC BLOOD PRESSURE: 137 MMHG | HEIGHT: 72 IN | TEMPERATURE: 97.7 F

## 2024-09-08 DIAGNOSIS — R19.7 DIARRHEA, UNSPECIFIED TYPE: ICD-10-CM

## 2024-09-08 LAB
ALBUMIN SERPL BCG-MCNC: 4.6 G/DL (ref 3.5–5.2)
ALP SERPL-CCNC: 65 U/L (ref 40–150)
ALT SERPL W P-5'-P-CCNC: 20 U/L (ref 0–50)
ANION GAP SERPL CALCULATED.3IONS-SCNC: 10 MMOL/L (ref 7–15)
AST SERPL W P-5'-P-CCNC: 22 U/L (ref 0–45)
BASOPHILS # BLD AUTO: 0 10E3/UL (ref 0–0.2)
BASOPHILS NFR BLD AUTO: 0 %
BILIRUB DIRECT SERPL-MCNC: <0.2 MG/DL (ref 0–0.3)
BILIRUB SERPL-MCNC: 0.2 MG/DL
BUN SERPL-MCNC: 5.3 MG/DL (ref 6–20)
CALCIUM SERPL-MCNC: 9.8 MG/DL (ref 8.8–10.4)
CHLORIDE SERPL-SCNC: 100 MMOL/L (ref 98–107)
CREAT SERPL-MCNC: 0.78 MG/DL (ref 0.51–0.95)
EGFRCR SERPLBLD CKD-EPI 2021: >90 ML/MIN/1.73M2
EOSINOPHIL # BLD AUTO: 0.1 10E3/UL (ref 0–0.7)
EOSINOPHIL NFR BLD AUTO: 2 %
ERYTHROCYTE [DISTWIDTH] IN BLOOD BY AUTOMATED COUNT: 12.5 % (ref 10–15)
GLUCOSE SERPL-MCNC: 120 MG/DL (ref 70–99)
HCG SERPL QL: NEGATIVE
HCO3 SERPL-SCNC: 26 MMOL/L (ref 22–29)
HCT VFR BLD AUTO: 38.1 % (ref 35–47)
HGB BLD-MCNC: 12.8 G/DL (ref 11.7–15.7)
IMM GRANULOCYTES # BLD: 0 10E3/UL
IMM GRANULOCYTES NFR BLD: 0 %
LIPASE SERPL-CCNC: 26 U/L (ref 13–60)
LYMPHOCYTES # BLD AUTO: 1.3 10E3/UL (ref 0.8–5.3)
LYMPHOCYTES NFR BLD AUTO: 18 %
MAGNESIUM SERPL-MCNC: 1.9 MG/DL (ref 1.7–2.3)
MCH RBC QN AUTO: 27.9 PG (ref 26.5–33)
MCHC RBC AUTO-ENTMCNC: 33.6 G/DL (ref 31.5–36.5)
MCV RBC AUTO: 83 FL (ref 78–100)
MONOCYTES # BLD AUTO: 0.6 10E3/UL (ref 0–1.3)
MONOCYTES NFR BLD AUTO: 7 %
NEUTROPHILS # BLD AUTO: 5.4 10E3/UL (ref 1.6–8.3)
NEUTROPHILS NFR BLD AUTO: 73 %
NRBC # BLD AUTO: 0 10E3/UL
NRBC BLD AUTO-RTO: 0 /100
PLATELET # BLD AUTO: 259 10E3/UL (ref 150–450)
POTASSIUM SERPL-SCNC: 3.6 MMOL/L (ref 3.4–5.3)
PROT SERPL-MCNC: 7.6 G/DL (ref 6.4–8.3)
RBC # BLD AUTO: 4.59 10E6/UL (ref 3.8–5.2)
SODIUM SERPL-SCNC: 136 MMOL/L (ref 135–145)
WBC # BLD AUTO: 7.5 10E3/UL (ref 4–11)

## 2024-09-08 PROCEDURE — 258N000003 HC RX IP 258 OP 636: Performed by: EMERGENCY MEDICINE

## 2024-09-08 PROCEDURE — 85025 COMPLETE CBC W/AUTO DIFF WBC: CPT | Performed by: EMERGENCY MEDICINE

## 2024-09-08 PROCEDURE — 83735 ASSAY OF MAGNESIUM: CPT | Performed by: EMERGENCY MEDICINE

## 2024-09-08 PROCEDURE — 99283 EMERGENCY DEPT VISIT LOW MDM: CPT | Mod: 25

## 2024-09-08 PROCEDURE — 36415 COLL VENOUS BLD VENIPUNCTURE: CPT | Performed by: EMERGENCY MEDICINE

## 2024-09-08 PROCEDURE — 83690 ASSAY OF LIPASE: CPT | Performed by: EMERGENCY MEDICINE

## 2024-09-08 PROCEDURE — 84703 CHORIONIC GONADOTROPIN ASSAY: CPT | Performed by: EMERGENCY MEDICINE

## 2024-09-08 PROCEDURE — 80053 COMPREHEN METABOLIC PANEL: CPT | Performed by: EMERGENCY MEDICINE

## 2024-09-08 PROCEDURE — 96360 HYDRATION IV INFUSION INIT: CPT

## 2024-09-08 RX ORDER — DICYCLOMINE HCL 20 MG
20 TABLET ORAL 4 TIMES DAILY PRN
Qty: 20 TABLET | Refills: 0 | Status: SHIPPED | OUTPATIENT
Start: 2024-09-08 | End: 2024-10-01

## 2024-09-08 RX ADMIN — SODIUM CHLORIDE 1000 ML: 9 INJECTION, SOLUTION INTRAVENOUS at 22:09

## 2024-09-08 ASSESSMENT — COLUMBIA-SUICIDE SEVERITY RATING SCALE - C-SSRS
1. IN THE PAST MONTH, HAVE YOU WISHED YOU WERE DEAD OR WISHED YOU COULD GO TO SLEEP AND NOT WAKE UP?: NO
6. HAVE YOU EVER DONE ANYTHING, STARTED TO DO ANYTHING, OR PREPARED TO DO ANYTHING TO END YOUR LIFE?: NO
2. HAVE YOU ACTUALLY HAD ANY THOUGHTS OF KILLING YOURSELF IN THE PAST MONTH?: NO

## 2024-09-08 ASSESSMENT — ACTIVITIES OF DAILY LIVING (ADL)
ADLS_ACUITY_SCORE: 35
ADLS_ACUITY_SCORE: 37

## 2024-09-09 ENCOUNTER — PATIENT OUTREACH (OUTPATIENT)
Dept: FAMILY MEDICINE | Facility: CLINIC | Age: 29
End: 2024-09-09
Payer: COMMERCIAL

## 2024-09-09 NOTE — ED TRIAGE NOTES
The pt presents for evaluation of lower abdominal cramping and diarrhea. She reports cramping is severe and began yesterday. Diarrhea began today and has been ongoing every 30 minutes. Positive for nausea but no vomiting or chills/fevers.

## 2024-09-09 NOTE — DISCHARGE INSTRUCTIONS
Your lab testing is all reassuring  Likely some sort of virus causing your diarrhea, however if you have ongoing diarrhea at home you may collect a sample and return it to our lab  You may continue Pepto-Bismol and Imodium at home  Dicyclomine as needed for any cramping pain  Stay hydrated as being dehydrated can cause that cramping worse  You may also try Tylenol and ibuprofen  Return if any high fevers, inability to stool, persistent vomiting or any other concern

## 2024-09-09 NOTE — ED NOTES
Patient discharged at 2332 to home. Patient provided with all discharge paperwork and educational material. All questions answered to patient's satisfaction.

## 2024-09-09 NOTE — TELEPHONE ENCOUNTER
Writer called patient and left message to return call to clinic.     If patient returns call please route to nurse queue to complete TCM hospital follow up questionnaire, medication reconciliation and assist with scheduling a hospital follow up visit..    BEENA Win, RN  [facility]

## 2024-09-09 NOTE — ED PROVIDER NOTES
EMERGENCY DEPARTMENT ENCOUNTER      NAME: Ailin Calhoun  AGE: 29 year old female  YOB: 1995  MRN: [id number]  EVALUATION DATE & TIME: 9/8/2024  9:32 PM    PCP: Lainey Tillman    ED PROVIDER: Keisha Centeno DO      Chief Complaint   Patient presents with    Diarrhea         FINAL IMPRESSION:  1. Diarrhea, unspecified type          ED COURSE & MEDICAL DECISION MAKING:    Pertinent Labs & Imaging studies reviewed. (See chart for details)  9:38 PM I met the patient and performed my initial interview and exam.  11:00 PM Patient reassessed and updated on results.  Cramping improved.    29 year old female presents to the Emergency Department for evaluation of diarrhea.  Symptoms started today.  She has had multiple episodes.  Having some nausea but no vomiting.  No fever.  She had coronavirus over a week ago.  Menstrual cycle just ended.  No known sick contacts.  No recent travel or antibiotic use.  She tried Imodium tonight without relief.  Having a lot of cramping.  No blood in her stool.  She is nontoxic-appearing.  Labs without any significant electrolyte abnormality, LFTs are normal.  No significant leukocytosis.  No anemia.  She is not pregnant.  She denies any urinary symptoms.  She was given IV fluids.  Repeat exam reassuring.  No reproducible pain.  Low suspicion for any anemia, appendicitis, diverticulitis, ovarian torsion and therefore imaging was not obtained.  Patient unable to have a bowel movement care.  We discussed symptomatic care for home.  Did discuss she could always collect a stool sample at home and return to the lab for further testing.  Encouraged her to continue Pepto-Bismol, Imodium, rehydration strategies.  Dicyclomine as needed for spasming pain.  Discussed return precautions for    At the conclusion of the encounter I discussed the results of all of the tests and the disposition. The questions were answered. The patient or family acknowledged understanding and was  "agreeable with the care plan.     Medical Decision Making  Obtained supplemental history:Supplemental history obtained?: No  Reviewed external records: External records reviewed?: Documented in chart  Care impacted by chronic illness:N/A  Care significantly affected by social determinants of health:N/A  Did you consider but not order tests?: Work up considered but not performed and documented in chart, if applicable  Did you interpret images independently?: Independent interpretation of ECG and images noted in documentation, when applicable.  Consultation discussion with other provider:Did you involve another provider (consultant, , pharmacy, etc.)?: No  Discharge. I prescribed additional prescription strength medication(s) as charted. See documentation for any additional details.      MEDICATIONS GIVEN IN THE EMERGENCY:  Medications   sodium chloride 0.9% BOLUS 1,000 mL (0 mLs Intravenous Stopped 9/8/24 3004)       NEW PRESCRIPTIONS STARTED AT TODAY'S ER VISIT  New Prescriptions    DICYCLOMINE (BENTYL) 20 MG TABLET    Take 1 tablet (20 mg) by mouth 4 times daily as needed (abdominal cramping).          =================================================================    HPI    Patient information was obtained from: patient     Use of : N/A         Ailin Calhoun is a 29 year old female with no pertinent history and discectomy who presents to this ED by walk-in for evaluation of diarrhea and abdominal pain.    Yesterday, patient had cramping upper abdominal pain and constipation. This morning around 0500, she developed intense upper abdominal cramping and diarrhea. She endorses episodes of diarrhea every 45 minutes, and states that her bowel movements became a yellow liquid as the day progressed. Patient also endorses a pounding headache. She describes the cramping as \"contractions\" and \"sharp.\" Patient drank water and Liquid IVs PTA. She also tried Pepto-Bismol, tylenol, and had 2 X imodium around " 1930. Since then, patient has had more episodes of diarrhea. She is currently experiencing abdominal cramping. Patient notes that she wanted to come in tomorrow morning, but had difficulty sleeping.    Patient states that her last menstrual period just ended and she is currently ovulating. The patient, her , and her child recovered from COVID-19 1.5 weeks ago. She takes medications including citalopram 30 mg, montelukast for allergies, norethindrone for birth control, and albuterol inhaler. Patient is prescribed hydroxyzine for anxiety, but rarely takes it because it makes her very sleepy. Patient reports surgical history of a herniated disc repair in 2022, and allergies to ibuprofen and aspirin. She is otherwise healthy.    Patient denies ay history of abdominal surgeries. She also denies sick contacts, fever, vomiting, new foods or medications, recent travel or antibiotics use, and any other symptoms or complaints at this time.       REVIEW OF SYSTEMS   Per HPI    PAST MEDICAL HISTORY:  Past Medical History:   Diagnosis Date    Asthma     Depressive disorder     Third-stage postpartum hemorrhage 12/4/2022    Total 850 ml 3 degree tear, started iron supplement , postpartum hemorrhage protocol started        PAST SURGICAL HISTORY:  Past Surgical History:   Procedure Laterality Date    HEMILAMINECTOMY, DISCECTOMY LUMBAR ONE LEVEL, COMBINED N/A 9/17/2022    Procedure: HEMILAMINECTOMY, SPINE, LUMBAR, 1 LEVEL, WITH DISCECTOMY;  Surgeon: Barrie Owen MD;  Location: [facility] OR    LAMINECTOMY LUMBAR ONE LEVEL N/A 9/17/2022    Procedure: LAMINECTOMY, SPINE, LUMBAR, 1 LEVEL;  Surgeon: Barrie Owen MD;  Location: [facility] OR           CURRENT MEDICATIONS:    dicyclomine (BENTYL) 20 MG tablet  albuterol (PROAIR HFA/PROVENTIL HFA/VENTOLIN HFA) 108 (90 Base) MCG/ACT inhaler  albuterol (PROVENTIL) (2.5 MG/3ML) 0.083% neb solution  citalopram (CELEXA) 10 MG tablet  citalopram (CELEXA) 20 MG tablet  hydrOXYzine  HCl (ATARAX) 25 MG tablet  montelukast (SINGULAIR) 10 MG tablet  norethindrone (MICRONOR) 0.35 MG tablet         ALLERGIES:  Allergies   Allergen Reactions    Aspirin Difficulty breathing, Hives, Itching, Nausea, Nausea and Vomiting and Shortness Of Breath    Ibuprofen Difficulty breathing, Fatigue, Headache, Itching, Nausea, Nausea and Vomiting and Shortness Of Breath    Aspirin     Ibuprofen        FAMILY HISTORY:  Family History   Problem Relation Age of Onset    Depression Mother     Obesity Mother     Anxiety Disorder Mother     No Known Problems Father     Depression Sister     Anxiety Disorder Sister     Asthma Sister     Anxiety Disorder Maternal Grandmother     Depression Maternal Grandmother     Obesity Maternal Grandmother     No Known Problems Maternal Grandfather     No Known Problems Paternal Grandmother     No Known Problems Paternal Grandfather        SOCIAL HISTORY:   Social History     Socioeconomic History    Marital status:    Tobacco Use    Smoking status: Never    Smokeless tobacco: Never   Vaping Use    Vaping status: Never Used   Substance and Sexual Activity    Alcohol use: Not Currently    Drug use: Never    Sexual activity: Yes     Partners: Female   Social History Narrative    ** Merged History Encounter **          , No children  Nliton Pantoja MD         Social Determinants of Health     Financial Resource Strain: Low Risk  (10/11/2023)    Financial Resource Strain     Within the past 12 months, have you or your family members you live with been unable to get utilities (heat, electricity) when it was really needed?: No   Food Insecurity: Low Risk  (10/11/2023)    Food Insecurity     Within the past 12 months, did you worry that your food would run out before you got money to buy more?: No     Within the past 12 months, did the food you bought just not last and you didn t have money to get more?: No   Transportation Needs: Low Risk  (10/11/2023)    Transportation Needs      Within the past 12 months, has lack of transportation kept you from medical appointments, getting your medicines, non-medical meetings or appointments, work, or from getting things that you need?: No   Interpersonal Safety: Low Risk  (10/12/2023)    Interpersonal Safety     Do you feel physically and emotionally safe where you currently live?: Yes     Within the past 12 months, have you been hit, slapped, kicked or otherwise physically hurt by someone?: No     Within the past 12 months, have you been humiliated or emotionally abused in other ways by your partner or ex-partner?: No   Housing Stability: Low Risk  (10/11/2023)    Housing Stability     Do you have housing? : Yes     Are you worried about losing your housing?: No       VITALS:  /88   Pulse 92   Temp 97.7  F (36.5  C) (Oral)   Resp 16   Ht 1.829 m (6')   Wt 118.8 kg (262 lb)   SpO2 97%   BMI 35.53 kg/m      PHYSICAL EXAM    Physical Exam  Constitutional:       General: She is not in acute distress.  HENT:      Head: Normocephalic and atraumatic.      Mouth/Throat:      Pharynx: Oropharynx is clear.   Eyes:      Pupils: Pupils are equal, round, and reactive to light.   Cardiovascular:      Rate and Rhythm: Normal rate and regular rhythm.      Pulses: Normal pulses.      Heart sounds: Normal heart sounds.   Pulmonary:      Effort: Pulmonary effort is normal.      Breath sounds: Normal breath sounds.   Abdominal:      General: Abdomen is flat. Bowel sounds are normal.      Palpations: Abdomen is soft.      Tenderness: There is no abdominal tenderness.   Musculoskeletal:         General: Normal range of motion.   Skin:     General: Skin is warm and dry.      Capillary Refill: Capillary refill takes less than 2 seconds.   Neurological:      General: No focal deficit present.      Mental Status: She is alert and oriented to person, place, and time.           LAB:  All pertinent labs reviewed and interpreted.  Labs Ordered and Resulted from Time  of ED Arrival to Time of ED Departure   BASIC METABOLIC PANEL - Abnormal       Result Value    Sodium 136      Potassium 3.6      Chloride 100      Carbon Dioxide (CO2) 26      Anion Gap 10      Urea Nitrogen 5.3 (*)     Creatinine 0.78      GFR Estimate >90      Calcium 9.8      Glucose 120 (*)    HEPATIC FUNCTION PANEL - Normal    Protein Total 7.6      Albumin 4.6      Bilirubin Total 0.2      Alkaline Phosphatase 65      AST 22      ALT 20      Bilirubin Direct <0.20     LIPASE - Normal    Lipase 26     MAGNESIUM - Normal    Magnesium 1.9     HCG QUALITATIVE PREGNANCY - Normal    hCG Serum Qualitative Negative     CBC WITH PLATELETS AND DIFFERENTIAL    WBC Count 7.5      RBC Count 4.59      Hemoglobin 12.8      Hematocrit 38.1      MCV 83      MCH 27.9      MCHC 33.6      RDW 12.5      Platelet Count 259      % Neutrophils 73      % Lymphocytes 18      % Monocytes 7      % Eosinophils 2      % Basophils 0      % Immature Granulocytes 0      NRBCs per 100 WBC 0      Absolute Neutrophils 5.4      Absolute Lymphocytes 1.3      Absolute Monocytes 0.6      Absolute Eosinophils 0.1      Absolute Basophils 0.0      Absolute Immature Granulocytes 0.0      Absolute NRBCs 0.0     ENTERIC BACTERIA AND VIRUS PANEL BY PCR   C. DIFFICILE TOXIN B PCR WITH REFLEX TO C. DIFFICILE ANTIGEN AND TOXINS A/B EIA       RADIOLOGY:  Reviewed all pertinent imaging. Please see official radiology report.  No orders to display           ILacey, am serving as a scribe to document services personally performed by Dr. Keisha Centeno based on my observation and the provider's statements to me. IKeisha, DO attest that Lacey Zurita is acting in a scribe capacity, has observed my performance of the services and has documented them in accordance with my direction.    Keisha Centeno DO  Emergency Medicine  [facility] EMERGENCY DEPARTMENT  [address] [place] [id number] [phone]  Dept:  [phone]      Ohcharmaine, Keisha LEE DO  09/08/24 2350

## 2024-09-10 NOTE — TELEPHONE ENCOUNTER
Writer called patient and left message to return call to clinic.     If patient returns call please route to nurse queue to complete TCM hospital follow up questionnaire, medication reconciliation and assist with scheduling a hospital follow up visit..    BEENA Win, RN  [facility]

## 2024-09-17 ENCOUNTER — OFFICE VISIT (OUTPATIENT)
Dept: FAMILY MEDICINE | Facility: CLINIC | Age: 29
End: 2024-09-17
Payer: COMMERCIAL

## 2024-09-17 VITALS
SYSTOLIC BLOOD PRESSURE: 116 MMHG | RESPIRATION RATE: 19 BRPM | HEART RATE: 95 BPM | WEIGHT: 260.8 LBS | TEMPERATURE: 98.4 F | OXYGEN SATURATION: 97 % | DIASTOLIC BLOOD PRESSURE: 77 MMHG | BODY MASS INDEX: 35.37 KG/M2

## 2024-09-17 DIAGNOSIS — N63.22 MASS OF UPPER INNER QUADRANT OF LEFT BREAST: Primary | ICD-10-CM

## 2024-09-17 PROCEDURE — 99214 OFFICE O/P EST MOD 30 MIN: CPT

## 2024-09-17 NOTE — PROGRESS NOTES
Assessment & Plan       ICD-10-CM    1. Mass of upper inner quadrant of left breast  N63.22 MA Diagnostic Digital Left     amoxicillin-clavulanate (AUGMENTIN) 875-125 MG tablet         Ailin  until 1.5 years ago and her breasts have still been intermittently leaking ever since. Now her L breast is quite painful and she feels a lump. No systemic symptoms. On exam there is a linear induration at the 10'o'clock position on the L breast that is very tender. It feels like a clogged milk duct and is in the right orientation and location. Lower likelihood of malignancy given that both breasts have been leaking (would have been more worrisome if it was unilateral), sudden onset of pain (and the fact that there's pain), and the history of recent breastfeeding.     So here's the plan:   Diagnostic mammogram of L breast   Augmentin for 10 days - treating like a mastitis given how tender it is, but okay to stop if mammogram results not consistent with infection  Home cares like mastitis as well, especially warm compresses to break up the induration if possible.       Follow up with primary care provider with any problems, questions or concerns or if symptoms worsen or fail to improve. Patient agreed to plan and verbalized understanding.     Subjective     Ailin is a 29 year old female who presents to clinic today for the following health issues:  Chief Complaint   Patient presents with    Breast Pain     On and off for about a month. LEFT breast. Does leak but has not breast fed in over a year and a half ago. Pain is sharp, tender, areola has sharp pain.      HPI     baby and was finished with that 1.5 years ago, but her nipples never stopped leaking. Last 2 days the L breast has been painful. Cycles are back and normal.     Review of Systems    10 point ROS performed and negative except as noted in HPI.     Problem List:  2023-01: Third degree perineal laceration during delivery,   unspecified  subtype  2022-12: Third-stage postpartum hemorrhage  2022-12: Encounter for induction of labor  2022-09: Lumbar herniated disc  2022-05: Heartburn  2022-03: Hx of abnormal cervical Pap smear  2022-01: Midline low back pain with left-sided sciatica, unspecified   chronicity  2022-01: Depression, unspecified depression type  2022-01: Insomnia, unspecified type  2022-01: Mild persistent asthma without complication      Past Medical History:   Diagnosis Date    Asthma     Depressive disorder     Third-stage postpartum hemorrhage 12/4/2022    Total 850 ml 3 degree tear, started iron supplement , postpartum hemorrhage protocol started        Social History     Tobacco Use    Smoking status: Never    Smokeless tobacco: Never   Substance Use Topics    Alcohol use: Not Currently           Objective    /77 (BP Location: Right arm, Patient Position: Sitting, Cuff Size: Adult Regular)   Pulse 95   Temp 98.4  F (36.9  C) (Oral)   Resp 19   Wt 118.3 kg (260 lb 12.8 oz)   LMP 08/27/2024   SpO2 97%   Breastfeeding No   BMI 35.37 kg/m    Physical Exam   Constitutional:       General: Patient is not in acute distress.     Appearance: Normal appearance.   HENT:      Head: Normocephalic and atraumatic.      Right Ear: External ear normal.      Left Ear: External ear normal.      Nose: No congestion, rhinorrhea.      Mouth/Throat:      Mouth: Mucous membranes are moist.      Pharynx: Oropharynx is clear, No exudate.   Eyes:      General: No scleral icterus.     Extraocular Movements: Extraocular movements intact.      Conjunctiva/sclera: Conjunctivae normal.      Pupils: Pupils are equal, round, and reactive to light.   Pulmonary:      Effort: Pulmonary effort is normal.   Cardiovascular:      Regular heart rate  Abdominal:      General: Abdomen is flat.   Musculoskeletal:         General: No swelling or deformity. Normal range of motion.      Cervical back: Normal range of motion and neck supple.   Skin:     General: Skin  is warm and dry.      Coloration: Skin is not jaundiced.      Findings: No bruising, lesion or rash.   Neurological:      General: No focal deficit present.      Mental Status: Patient is alert. Mental status is at baseline.   Psychiatric:         Mood and Affect: Mood normal.         Behavior: Behavior normal.         Thought Content: Thought content normal.   Breast:   R breast normal to palpation and no skin changes  L breast tender to palpation with linear induration about 2.5 cm long and 1 cm wide extending from chest towards nipple and that area is the most tender to palpation. The induration is at the 10'o'clock position. No skin changes.         Payton Reyes MD

## 2024-09-20 ENCOUNTER — ANCILLARY PROCEDURE (OUTPATIENT)
Dept: MAMMOGRAPHY | Facility: CLINIC | Age: 29
End: 2024-09-20
Payer: COMMERCIAL

## 2024-09-20 DIAGNOSIS — N63.22 MASS OF UPPER INNER QUADRANT OF LEFT BREAST: ICD-10-CM

## 2024-09-20 PROCEDURE — 76641 ULTRASOUND BREAST COMPLETE: CPT | Mod: 50

## 2024-09-20 PROCEDURE — 76642 ULTRASOUND BREAST LIMITED: CPT | Mod: LT

## 2024-09-30 NOTE — PROGRESS NOTES
PLASTIC SURGERY HISTORY AND PHYSICAL    Chief Complaint:Hypertrophy of breasts   Referring Provider: Lainey Tillman      HPI:  Ailin Calhoun  is a 29 year old female who presents with symptomatic macromastia. She currently wears a 44DD, would prefer to be a C/B. She complains of back pain, neck pain and shoulder pain for 3 years. Feels that large breasts affect posture.  She has tried the following without successful resolution of pain symptoms: PT, stretches, posture corrector device, pain medication including tylenol, back support bras, buying specialty bras. Denies family history of breast cancer. Recent history of left breast mass/pain that resolved with antibiotics and can no longer feel bump. Stopped breast feeding 2 years ago.     Recent mammogram: no but recent ultrasound BI-RADS 1   Rashes: no    PMH:   Past Medical History:   Diagnosis Date    Asthma     Depressive disorder     Third-stage postpartum hemorrhage 12/4/2022    Total 850 ml 3 degree tear, started iron supplement , postpartum hemorrhage protocol started        PSH:   Past Surgical History:   Procedure Laterality Date    HEMILAMINECTOMY, DISCECTOMY LUMBAR ONE LEVEL, COMBINED N/A 9/17/2022    LAMINECTOMY LUMBAR ONE LEVEL N/A 9/17/2022       FH:   Family History   Problem Relation Age of Onset    Depression Mother     Obesity Mother     Anxiety Disorder Mother     No Known Problems Father     Depression Sister     Anxiety Disorder Sister     Asthma Sister     Anxiety Disorder Maternal Grandmother     Depression Maternal Grandmother     Obesity Maternal Grandmother     No Known Problems Maternal Grandfather     No Known Problems Paternal Grandmother     No Known Problems Paternal Grandfather         SH:   Social History     Tobacco Use    Smoking status: Never    Smokeless tobacco: Never   Vaping Use    Vaping status: Never Used   Substance Use Topics    Alcohol use: Not Currently    Drug use: Never      Work/school: [employer]- no  physical activity.    MEDS:     Current Outpatient Medications:     albuterol (PROAIR HFA/PROVENTIL HFA/VENTOLIN HFA) 108 (90 Base) MCG/ACT inhaler, INHALE 2 PUFFS INTO THE LUNGS EVERY 6 HOURS AS NEEDED FOR WHEEZING OR SHORTNESS OF BREATH OR DIFFICULT BREATHING, Disp: 8.5 g, Rfl: 0    albuterol (PROVENTIL) (2.5 MG/3ML) 0.083% neb solution, Take 2 vials (5 mg) by nebulization every 6 hours as needed for shortness of breath / dyspnea or wheezing, Disp: 75 mL, Rfl: 0    citalopram (CELEXA) 10 MG tablet, Take 1 tablet (10 mg) by mouth daily, Disp: 90 tablet, Rfl: 1    citalopram (CELEXA) 20 MG tablet, Take 1 tablet (20 mg) by mouth daily, Disp: 90 tablet, Rfl: 1    hydrOXYzine HCl (ATARAX) 25 MG tablet, Take 1-4 tablets ( mg) by mouth nightly as needed for itching, Disp: 90 tablet, Rfl: 1    montelukast (SINGULAIR) 10 MG tablet, Take 1 tablet (10 mg) by mouth at bedtime, Disp: 90 tablet, Rfl: 3    norethindrone (MICRONOR) 0.35 MG tablet, TAKE 1 TABLET(0.35 MG) BY MOUTH DAILY, Disp: 84 tablet, Rfl: 2       ALLERGIES:     Allergies   Allergen Reactions    Aspirin Difficulty breathing, Hives, Itching, Nausea, Nausea and Vomiting and Shortness Of Breath    Ibuprofen Difficulty breathing, Fatigue, Headache, Itching, Nausea, Nausea and Vomiting and Shortness Of Breath    Aspirin     Ibuprofen      ROS: Denies chest pain, shortness of breath, MI, CVA, DVT, PE, and bleeding disorders.     PHYSICAL EXAMINATION:   /70 (BP Location: Right arm, Patient Position: Sitting, Cuff Size: Adult Regular)   Pulse 65   Ht 1.829 m (6')   LMP 08/27/2024   SpO2 96%   BMI 35.37 kg/m     BMI: Body mass index is 35.37 kg/m .  Body surface area is 2.45 meters squared.   General: NAD  Chest: bilateral macromastia with grade II-III ptosis. Nipple to notch 34cm bilaterally.   +shoulder grooving    1393g     ASSESSMENT:  29 year old female PMH asthma, depression who presents with symptomatic macromastia desiring breast reduction.      PLAN:   According to Schnur scale 1393g will need to be removed, this will be approximately 90% reduction from her current size. She understands that this amount will need to be removed to be covered by insurance.     I think that this is a high percentage of her breast and we may not be able to remove it to meet insurance criteria. I do think that we would remove ~1000g to get her close to her desired B/C cup. Upon discussion she is interested in losing some weight, has a goal weight of ~200lbs. I will place a consult for [facility] and she can discuss with them to see options to help with weight loss. Lower weight will decrease her schnur and allow us to meet insurance criteria.     Ultimately, I think she will be a good candidate for breast reduction once her schnur is slightly lowered and we look forward to helping her in the future.     Payton Liu PA-C  Plastic and Reconstructive Surgery     50 minutes spent on the date of the encounter doing chart review, history and physical, documentation and further activity as noted above.

## 2024-10-01 ENCOUNTER — PRE VISIT (OUTPATIENT)
Dept: PLASTIC SURGERY | Facility: CLINIC | Age: 29
End: 2024-10-01

## 2024-10-01 ENCOUNTER — OFFICE VISIT (OUTPATIENT)
Dept: PLASTIC SURGERY | Facility: CLINIC | Age: 29
End: 2024-10-01
Payer: COMMERCIAL

## 2024-10-01 VITALS
OXYGEN SATURATION: 96 % | BODY MASS INDEX: 35.37 KG/M2 | DIASTOLIC BLOOD PRESSURE: 70 MMHG | HEART RATE: 65 BPM | SYSTOLIC BLOOD PRESSURE: 122 MMHG | HEIGHT: 72 IN

## 2024-10-01 DIAGNOSIS — E66.812 CLASS 2 OBESITY WITHOUT SERIOUS COMORBIDITY WITH BODY MASS INDEX (BMI) OF 35.0 TO 35.9 IN ADULT, UNSPECIFIED OBESITY TYPE: ICD-10-CM

## 2024-10-01 DIAGNOSIS — G89.29 CHRONIC BACK PAIN, UNSPECIFIED BACK LOCATION, UNSPECIFIED BACK PAIN LATERALITY: ICD-10-CM

## 2024-10-01 DIAGNOSIS — N62 LARGE BREASTS: Primary | ICD-10-CM

## 2024-10-01 DIAGNOSIS — M54.2 NECK PAIN: ICD-10-CM

## 2024-10-01 DIAGNOSIS — M54.9 CHRONIC BACK PAIN, UNSPECIFIED BACK LOCATION, UNSPECIFIED BACK PAIN LATERALITY: ICD-10-CM

## 2024-10-01 PROCEDURE — 99204 OFFICE O/P NEW MOD 45 MIN: CPT | Performed by: PHYSICIAN ASSISTANT

## 2024-10-01 ASSESSMENT — PAIN SCALES - GENERAL: PAINLEVEL: NO PAIN (0)

## 2024-10-01 NOTE — NURSING NOTE
Chief Complaint   Patient presents with    Consult     Breast reduction.       Vitals:    10/01/24 1004   BP: 122/70   BP Location: Right arm   Patient Position: Sitting   Cuff Size: Adult Regular   Pulse: 65   SpO2: 96%   Height: 1.829 m (6')       Body mass index is 35.37 kg/m .      Alonso Patterson EMT

## 2024-10-01 NOTE — LETTER
10/1/2024       RE: Ailin Calhoun  [address]  [facility] [address]     Dear Colleague,    Thank you for referring your patient, Ailin Calhoun, to the [facility] [place] at [facility]. Please see a copy of my visit note below.    PLASTIC SURGERY HISTORY AND PHYSICAL    Chief Complaint:Hypertrophy of breasts   Referring Provider: Lainey Tillman      HPI:  Ailin Calhoun  is a 29 year old female who presents with symptomatic macromastia. She currently wears a 44DD, would prefer to be a C/B. She complains of back pain, neck pain and shoulder pain for 3 years. Feels that large breasts affect posture.  She has tried the following without successful resolution of pain symptoms: PT, stretches, posture corrector device, pain medication including tylenol, back support bras, buying specialty bras. Denies family history of breast cancer. Recent history of left breast mass/pain that resolved with antibiotics and can no longer feel bump. Stopped breast feeding 2 years ago.     Recent mammogram: no but recent ultrasound BI-RADS 1   Rashes: no    PMH:   Past Medical History:   Diagnosis Date     Asthma      Depressive disorder      Third-stage postpartum hemorrhage 12/4/2022    Total 850 ml 3 degree tear, started iron supplement , postpartum hemorrhage protocol started        PSH:   Past Surgical History:   Procedure Laterality Date     HEMILAMINECTOMY, DISCECTOMY LUMBAR ONE LEVEL, COMBINED N/A 9/17/2022     LAMINECTOMY LUMBAR ONE LEVEL N/A 9/17/2022       FH:   Family History   Problem Relation Age of Onset     Depression Mother      Obesity Mother      Anxiety Disorder Mother      No Known Problems Father      Depression Sister      Anxiety Disorder Sister      Asthma Sister      Anxiety Disorder Maternal Grandmother      Depression Maternal Grandmother      Obesity Maternal Grandmother      No Known  Problems Maternal Grandfather      No Known Problems Paternal Grandmother      No Known Problems Paternal Grandfather         SH:   Social History     Tobacco Use     Smoking status: Never     Smokeless tobacco: Never   Vaping Use     Vaping status: Never Used   Substance Use Topics     Alcohol use: Not Currently     Drug use: Never      Work/school: [employer]- no physical activity.    MEDS:     Current Outpatient Medications:      albuterol (PROAIR HFA/PROVENTIL HFA/VENTOLIN HFA) 108 (90 Base) MCG/ACT inhaler, INHALE 2 PUFFS INTO THE LUNGS EVERY 6 HOURS AS NEEDED FOR WHEEZING OR SHORTNESS OF BREATH OR DIFFICULT BREATHING, Disp: 8.5 g, Rfl: 0     albuterol (PROVENTIL) (2.5 MG/3ML) 0.083% neb solution, Take 2 vials (5 mg) by nebulization every 6 hours as needed for shortness of breath / dyspnea or wheezing, Disp: 75 mL, Rfl: 0     citalopram (CELEXA) 10 MG tablet, Take 1 tablet (10 mg) by mouth daily, Disp: 90 tablet, Rfl: 1     citalopram (CELEXA) 20 MG tablet, Take 1 tablet (20 mg) by mouth daily, Disp: 90 tablet, Rfl: 1     hydrOXYzine HCl (ATARAX) 25 MG tablet, Take 1-4 tablets ( mg) by mouth nightly as needed for itching, Disp: 90 tablet, Rfl: 1     montelukast (SINGULAIR) 10 MG tablet, Take 1 tablet (10 mg) by mouth at bedtime, Disp: 90 tablet, Rfl: 3     norethindrone (MICRONOR) 0.35 MG tablet, TAKE 1 TABLET(0.35 MG) BY MOUTH DAILY, Disp: 84 tablet, Rfl: 2       ALLERGIES:     Allergies   Allergen Reactions     Aspirin Difficulty breathing, Hives, Itching, Nausea, Nausea and Vomiting and Shortness Of Breath     Ibuprofen Difficulty breathing, Fatigue, Headache, Itching, Nausea, Nausea and Vomiting and Shortness Of Breath     Aspirin      Ibuprofen      ROS: Denies chest pain, shortness of breath, MI, CVA, DVT, PE, and bleeding disorders.     PHYSICAL EXAMINATION:   /70 (BP Location: Right arm, Patient Position: Sitting, Cuff Size: Adult Regular)   Pulse 65   Ht 1.829 m (6')   LMP 08/27/2024    SpO2 96%   BMI 35.37 kg/m     BMI: Body mass index is 35.37 kg/m .  Body surface area is 2.45 meters squared.   General: NAD  Chest: bilateral macromastia with grade II-III ptosis. Nipple to notch 34cm bilaterally.   +shoulder grooving    1393g     ASSESSMENT:  29 year old female PMH asthma, depression who presents with symptomatic macromastia desiring breast reduction.     PLAN:   According to Schnur scale 1393g will need to be removed, this will be approximately 90% reduction from her current size. She understands that this amount will need to be removed to be covered by insurance.     I think that this is a high percentage of her breast and we may not be able to remove it to meet insurance criteria. I do think that we would remove ~1000g to get her close to her desired B/C cup. Upon discussion she is interested in losing some weight, has a goal weight of ~200lbs. I will place a consult for [facility] and she can discuss with them to see options to help with weight loss. Lower weight will decrease her schnur and allow us to meet insurance criteria.     Ultimately, I think she will be a good candidate for breast reduction once her schnur is slightly lowered and we look forward to helping her in the future.     Payton Liu PA-C  Plastic and Reconstructive Surgery     50 minutes spent on the date of the encounter doing chart review, history and physical, documentation and further activity as noted above.      Again, thank you for allowing me to participate in the care of your patient.      Sincerely,    Payton Liu PA-C

## 2024-10-02 DIAGNOSIS — F32.1 CURRENT MODERATE EPISODE OF MAJOR DEPRESSIVE DISORDER WITHOUT PRIOR EPISODE (H): ICD-10-CM

## 2024-10-02 DIAGNOSIS — F41.9 ANXIETY: ICD-10-CM

## 2024-10-02 RX ORDER — CITALOPRAM HYDROBROMIDE 20 MG/1
20 TABLET ORAL DAILY
Qty: 90 TABLET | Refills: 3 | Status: SHIPPED | OUTPATIENT
Start: 2024-10-02

## 2024-10-15 ENCOUNTER — MYC REFILL (OUTPATIENT)
Dept: FAMILY MEDICINE | Facility: CLINIC | Age: 29
End: 2024-10-15
Payer: COMMERCIAL

## 2024-10-15 DIAGNOSIS — J45.30 MILD PERSISTENT ASTHMA WITHOUT COMPLICATION: ICD-10-CM

## 2024-10-15 RX ORDER — MONTELUKAST SODIUM 10 MG/1
1 TABLET ORAL AT BEDTIME
Qty: 90 TABLET | Refills: 3 | Status: SHIPPED | OUTPATIENT
Start: 2024-10-15

## 2024-10-16 RX ORDER — ALBUTEROL SULFATE 90 UG/1
2 INHALANT RESPIRATORY (INHALATION) EVERY 4 HOURS PRN
Qty: 8.5 G | Refills: 0 | Status: SHIPPED | OUTPATIENT
Start: 2024-10-16 | End: 2024-11-04

## 2024-10-26 DIAGNOSIS — F32.A DEPRESSION, UNSPECIFIED DEPRESSION TYPE: ICD-10-CM

## 2024-10-28 RX ORDER — CITALOPRAM HYDROBROMIDE 10 MG/1
10 TABLET ORAL DAILY
Qty: 90 TABLET | Refills: 0 | Status: SHIPPED | OUTPATIENT
Start: 2024-10-28

## 2024-11-03 DIAGNOSIS — J45.30 MILD PERSISTENT ASTHMA WITHOUT COMPLICATION: ICD-10-CM

## 2024-11-04 RX ORDER — ALBUTEROL SULFATE 90 UG/1
INHALANT RESPIRATORY (INHALATION)
Qty: 8.5 G | Refills: 0 | Status: SHIPPED | OUTPATIENT
Start: 2024-11-04

## 2024-11-18 NOTE — PROGRESS NOTES
Ailin is a 29 year old who is being evaluated via a billable video visit.    How would you like to obtain your AVS? MyChart  If the video visit is dropped, the invitation should be resent by: Text to cell phone: [phone]  Will anyone else be joining your video visit? No      Assessment/Plan:    Chronic back pain, unspecified back location, unspecified back pain laterality  Worsening chronic back pain - pt is working hard to manage her back pain (using ice/heat, doing PT exercises, tylenol PRN) - feels that muscles are tight, hx bulging disc requiring surgery but feels that this is different - will trial flexeril - if not improving would consider referral to spine clinic.  - cyclobenzaprine (FLEXERIL) 5 MG tablet  Dispense: 60 tablet; Refill: 1    Inattention  Pt interested in getting ADHD evaluation done, referral placed today.   - Adult Mental Health  Referral    Nipple discharge  Pt stopped nursing about 1.5 yrs ago - still leaking milk from both breasts (not expressing this, is leaking onto bra/shirt); had mastitis a little while ago and L breast US nml at that time. Recommend checking prolactin level.   - Prolactin       Follow up: as needed    Lainey Tillman MD  [facility]      Subjective:   Ailin Clahoun is a 29 year old female being seen today via video visit for back pain    -started working for [employer] part time again and got a house!  -back not doing well - did PT in the past and has continued them, also seeing chiropractor - had breast reduction consultation, was told needed to lose weight first, has referral to see weight management)  -back pain worse at night and waking up with pain, using pillow between legs, thinks muscles are tight - using ice/heat, doesn't think is herniated disc again because this feels different (no radiculopathy symptoms) - using tylenol PRN  -chickened out of ADHD referral - interested in doing this now  -almost 1.5 yrs out from breastfeeding,  still leaking/milky, had L US d/t mastitis and nml - not expressing    Answers submitted by the patient for this visit:  Patient Health Questionnaire (Submitted on 11/20/2024)  If you checked off any problems, how difficult have these problems made it for you to do your work, take care of things at home, or get along with other people?: Not difficult at all  PHQ9 TOTAL SCORE: 4  Patient Health Questionnaire (G7) (Submitted on 11/20/2024)  BETH 7 TOTAL SCORE: 7  Depression / Anxiety Questionnaire (Submitted on 11/20/2024)  Chief Complaint: Chronic problems general questions HPI Form  Depression/Anxiety: Depression & Anxiety  Depression & Anxiety (Submitted on 11/20/2024)  Chief Complaint: Chronic problems general questions HPI Form  Status since last visit:: medium  Anxiety since last: : medium  Other associated symptoms of depression:: No  Other associated symotome: : Yes  Significant life event: : No  Anxious:: Yes  Current substance use:: No  General Questionnaire (Submitted on 11/20/2024)  Chief Complaint: Chronic problems general questions HPI Form  What is the reason for your visit today? : Back pain and anxiety  How many servings of fruits and vegetables do you eat daily?: 0-1  On average, how many sweetened beverages do you drink each day (Examples: soda, juice, sweet tea, etc.  Do NOT count diet or artificially sweetened beverages)?: 1  How many minutes a day do you exercise enough to make your heart beat faster?: 10 to 19  How many days a week do you exercise enough to make your heart beat faster?: 3 or less  How many days per week do you miss taking your medication?: 0  Questionnaire about: Chronic problems general questions HPI Form (Submitted on 11/20/2024)  Chief Complaint: Chronic problems general questions HPI Form    Patient Active Problem List   Diagnosis    Mild persistent asthma without complication    Midline low back pain with left-sided sciatica, unspecified chronicity    Depression, unspecified  depression type    Insomnia, unspecified type    Hx of abnormal cervical Pap smear    Lumbar herniated disc    Third degree perineal laceration during delivery, unspecified subtype     Past Medical History:   Diagnosis Date    Asthma     Depressive disorder     Third-stage postpartum hemorrhage 12/4/2022    Total 850 ml 3 degree tear, started iron supplement , postpartum hemorrhage protocol started      Past Surgical History:   Procedure Laterality Date    HEMILAMINECTOMY, DISCECTOMY LUMBAR ONE LEVEL, COMBINED N/A 9/17/2022    Procedure: HEMILAMINECTOMY, SPINE, LUMBAR, 1 LEVEL, WITH DISCECTOMY;  Surgeon: Barrie Owen MD;  Location: [facility] OR    LAMINECTOMY LUMBAR ONE LEVEL N/A 9/17/2022    Procedure: LAMINECTOMY, SPINE, LUMBAR, 1 LEVEL;  Surgeon: Barrie Owen MD;  Location: [facility] OR     Current Outpatient Medications   Medication Sig Dispense Refill    albuterol (PROAIR HFA/PROVENTIL HFA/VENTOLIN HFA) 108 (90 Base) MCG/ACT inhaler INHALE 2 PUFFS INTO THE LUNGS EVERY 4 HOURS AS NEEDED FOR SHORTNESS OF BREATH OR WHEEZING OR COUGH 8.5 g 0    albuterol (PROVENTIL) (2.5 MG/3ML) 0.083% neb solution Take 2 vials (5 mg) by nebulization every 6 hours as needed for shortness of breath / dyspnea or wheezing 75 mL 0    citalopram (CELEXA) 10 MG tablet TAKE 1 TABLET(10 MG) BY MOUTH DAILY 90 tablet 0    citalopram (CELEXA) 20 MG tablet TAKE 1 TABLET(20 MG) BY MOUTH DAILY 90 tablet 3    cyclobenzaprine (FLEXERIL) 5 MG tablet Take 1-2 tablets (5-10 mg) by mouth 3 times daily as needed for muscle spasms. 60 tablet 1    hydrOXYzine HCl (ATARAX) 25 MG tablet Take 1-4 tablets ( mg) by mouth nightly as needed for itching 90 tablet 1    montelukast (SINGULAIR) 10 MG tablet TAKE 1 TABLET(10 MG) BY MOUTH AT BEDTIME 90 tablet 3    norethindrone (MICRONOR) 0.35 MG tablet TAKE 1 TABLET(0.35 MG) BY MOUTH DAILY 84 tablet 2     No current facility-administered medications for this visit.     Allergies   Allergen Reactions     Aspirin Difficulty breathing, Hives, Itching, Nausea, Nausea and Vomiting and Shortness Of Breath    Ibuprofen Difficulty breathing, Fatigue, Headache, Itching, Nausea, Nausea and Vomiting and Shortness Of Breath    Aspirin     Ibuprofen      Social History     Socioeconomic History    Marital status:      Spouse name: Not on file    Number of children: Not on file    Years of education: Not on file    Highest education level: Not on file   Occupational History    Not on file   Tobacco Use    Smoking status: Never    Smokeless tobacco: Never   Vaping Use    Vaping status: Never Used   Substance and Sexual Activity    Alcohol use: Not Currently    Drug use: Never    Sexual activity: Yes     Partners: Female   Other Topics Concern    Not on file   Social History Narrative    ** Merged History Encounter **          , No children  Nilton Pantoja MD         Social Drivers of Health     Financial Resource Strain: Low Risk  (10/11/2023)    Financial Resource Strain     Within the past 12 months, have you or your family members you live with been unable to get utilities (heat, electricity) when it was really needed?: No   Food Insecurity: Low Risk  (10/11/2023)    Food Insecurity     Within the past 12 months, did you worry that your food would run out before you got money to buy more?: No     Within the past 12 months, did the food you bought just not last and you didn t have money to get more?: No   Transportation Needs: Low Risk  (10/11/2023)    Transportation Needs     Within the past 12 months, has lack of transportation kept you from medical appointments, getting your medicines, non-medical meetings or appointments, work, or from getting things that you need?: No   Physical Activity: Not on file   Stress: Not on file   Social Connections: Not on file   Interpersonal Safety: Low Risk  (10/12/2023)    Interpersonal Safety     Do you feel physically and emotionally safe where you currently live?: Yes      Within the past 12 months, have you been hit, slapped, kicked or otherwise physically hurt by someone?: No     Within the past 12 months, have you been humiliated or emotionally abused in other ways by your partner or ex-partner?: No   Housing Stability: Low Risk  (10/11/2023)    Housing Stability     Do you have housing? : Yes     Are you worried about losing your housing?: No     Family History   Problem Relation Age of Onset    Depression Mother     Obesity Mother     Anxiety Disorder Mother     No Known Problems Father     Depression Sister     Anxiety Disorder Sister     Asthma Sister     Anxiety Disorder Maternal Grandmother     Depression Maternal Grandmother     Obesity Maternal Grandmother     No Known Problems Maternal Grandfather     No Known Problems Paternal Grandmother     No Known Problems Paternal Grandfather      Review of systems is as stated in HPI, and the remainder of system review is otherwise negative.    Objective:     There were no vitals taken for this visit.    General appearance: awake, NAD  HEENT: atraumatic, normocephalic  Lungs: breathing comfortably on room air, no cough  Neuro: alert, oriented x3, CNs grossly intact, no focal deficits appreciated  Psych: normal mood/affect/behavior, answering questions appropriately, linear thought process    Video-Visit Details    Type of service:  Video Visit   Start time :4:50PM  End time: 5:05PM  Originating Location (pt. Location): Home  Distant Location (provider location):  On-site  Platform used for Video Visit: Clarence  Signed Electronically by: Lainey Tillman MD

## 2024-11-20 ENCOUNTER — VIRTUAL VISIT (OUTPATIENT)
Dept: FAMILY MEDICINE | Facility: CLINIC | Age: 29
End: 2024-11-20
Payer: COMMERCIAL

## 2024-11-20 DIAGNOSIS — R41.840 INATTENTION: ICD-10-CM

## 2024-11-20 DIAGNOSIS — G89.29 CHRONIC BACK PAIN, UNSPECIFIED BACK LOCATION, UNSPECIFIED BACK PAIN LATERALITY: Primary | ICD-10-CM

## 2024-11-20 DIAGNOSIS — M54.9 CHRONIC BACK PAIN, UNSPECIFIED BACK LOCATION, UNSPECIFIED BACK PAIN LATERALITY: Primary | ICD-10-CM

## 2024-11-20 DIAGNOSIS — N64.52 NIPPLE DISCHARGE: ICD-10-CM

## 2024-11-20 PROBLEM — R12 HEARTBURN: Status: RESOLVED | Noted: 2022-05-04 | Resolved: 2024-11-20

## 2024-11-20 PROCEDURE — 99213 OFFICE O/P EST LOW 20 MIN: CPT | Mod: 95 | Performed by: FAMILY MEDICINE

## 2024-11-20 PROCEDURE — G2211 COMPLEX E/M VISIT ADD ON: HCPCS | Mod: 95 | Performed by: FAMILY MEDICINE

## 2024-11-20 RX ORDER — CYCLOBENZAPRINE HCL 5 MG
5-10 TABLET ORAL 3 TIMES DAILY PRN
Qty: 60 TABLET | Refills: 1 | Status: SHIPPED | OUTPATIENT
Start: 2024-11-20

## 2024-11-20 ASSESSMENT — ANXIETY QUESTIONNAIRES
3. WORRYING TOO MUCH ABOUT DIFFERENT THINGS: SEVERAL DAYS
4. TROUBLE RELAXING: SEVERAL DAYS
IF YOU CHECKED OFF ANY PROBLEMS ON THIS QUESTIONNAIRE, HOW DIFFICULT HAVE THESE PROBLEMS MADE IT FOR YOU TO DO YOUR WORK, TAKE CARE OF THINGS AT HOME, OR GET ALONG WITH OTHER PEOPLE: SOMEWHAT DIFFICULT
2. NOT BEING ABLE TO STOP OR CONTROL WORRYING: SEVERAL DAYS
GAD7 TOTAL SCORE: 7
GAD7 TOTAL SCORE: 7
7. FEELING AFRAID AS IF SOMETHING AWFUL MIGHT HAPPEN: SEVERAL DAYS
6. BECOMING EASILY ANNOYED OR IRRITABLE: SEVERAL DAYS
1. FEELING NERVOUS, ANXIOUS, OR ON EDGE: SEVERAL DAYS
8. IF YOU CHECKED OFF ANY PROBLEMS, HOW DIFFICULT HAVE THESE MADE IT FOR YOU TO DO YOUR WORK, TAKE CARE OF THINGS AT HOME, OR GET ALONG WITH OTHER PEOPLE?: SOMEWHAT DIFFICULT
5. BEING SO RESTLESS THAT IT IS HARD TO SIT STILL: SEVERAL DAYS
7. FEELING AFRAID AS IF SOMETHING AWFUL MIGHT HAPPEN: SEVERAL DAYS
GAD7 TOTAL SCORE: 7

## 2024-11-20 ASSESSMENT — ASTHMA QUESTIONNAIRES
QUESTION_5 LAST FOUR WEEKS HOW WOULD YOU RATE YOUR ASTHMA CONTROL: SOMEWHAT CONTROLLED
QUESTION_2 LAST FOUR WEEKS HOW OFTEN HAVE YOU HAD SHORTNESS OF BREATH: ONCE OR TWICE A WEEK
QUESTION_4 LAST FOUR WEEKS HOW OFTEN HAVE YOU USED YOUR RESCUE INHALER OR NEBULIZER MEDICATION (SUCH AS ALBUTEROL): ONCE A WEEK OR LESS
ACT_TOTALSCORE: 20
QUESTION_3 LAST FOUR WEEKS HOW OFTEN DID YOUR ASTHMA SYMPTOMS (WHEEZING, COUGHING, SHORTNESS OF BREATH, CHEST TIGHTNESS OR PAIN) WAKE YOU UP AT NIGHT OR EARLIER THAN USUAL IN THE MORNING: ONCE OR TWICE
QUESTION_1 LAST FOUR WEEKS HOW MUCH OF THE TIME DID YOUR ASTHMA KEEP YOU FROM GETTING AS MUCH DONE AT WORK, SCHOOL OR AT HOME: NONE OF THE TIME
ACT_TOTALSCORE: 20

## 2024-11-20 ASSESSMENT — PATIENT HEALTH QUESTIONNAIRE - PHQ9
10. IF YOU CHECKED OFF ANY PROBLEMS, HOW DIFFICULT HAVE THESE PROBLEMS MADE IT FOR YOU TO DO YOUR WORK, TAKE CARE OF THINGS AT HOME, OR GET ALONG WITH OTHER PEOPLE: NOT DIFFICULT AT ALL
SUM OF ALL RESPONSES TO PHQ QUESTIONS 1-9: 4
SUM OF ALL RESPONSES TO PHQ QUESTIONS 1-9: 4

## 2024-12-11 ASSESSMENT — ANXIETY QUESTIONNAIRES
8. IF YOU CHECKED OFF ANY PROBLEMS, HOW DIFFICULT HAVE THESE MADE IT FOR YOU TO DO YOUR WORK, TAKE CARE OF THINGS AT HOME, OR GET ALONG WITH OTHER PEOPLE?: VERY DIFFICULT
IF YOU CHECKED OFF ANY PROBLEMS ON THIS QUESTIONNAIRE, HOW DIFFICULT HAVE THESE PROBLEMS MADE IT FOR YOU TO DO YOUR WORK, TAKE CARE OF THINGS AT HOME, OR GET ALONG WITH OTHER PEOPLE: VERY DIFFICULT
7. FEELING AFRAID AS IF SOMETHING AWFUL MIGHT HAPPEN: MORE THAN HALF THE DAYS
5. BEING SO RESTLESS THAT IT IS HARD TO SIT STILL: MORE THAN HALF THE DAYS
6. BECOMING EASILY ANNOYED OR IRRITABLE: MORE THAN HALF THE DAYS
GAD7 TOTAL SCORE: 16
GAD7 TOTAL SCORE: 16
2. NOT BEING ABLE TO STOP OR CONTROL WORRYING: NEARLY EVERY DAY
4. TROUBLE RELAXING: MORE THAN HALF THE DAYS
GAD7 TOTAL SCORE: 16
3. WORRYING TOO MUCH ABOUT DIFFERENT THINGS: NEARLY EVERY DAY
7. FEELING AFRAID AS IF SOMETHING AWFUL MIGHT HAPPEN: MORE THAN HALF THE DAYS
1. FEELING NERVOUS, ANXIOUS, OR ON EDGE: MORE THAN HALF THE DAYS

## 2024-12-14 ENCOUNTER — HEALTH MAINTENANCE LETTER (OUTPATIENT)
Age: 29
End: 2024-12-14

## 2024-12-16 ENCOUNTER — VIRTUAL VISIT (OUTPATIENT)
Dept: PSYCHOLOGY | Facility: CLINIC | Age: 29
End: 2024-12-16
Attending: FAMILY MEDICINE
Payer: COMMERCIAL

## 2024-12-16 DIAGNOSIS — F41.1 GENERALIZED ANXIETY DISORDER: Primary | ICD-10-CM

## 2024-12-16 DIAGNOSIS — F33.1 MAJOR DEPRESSIVE DISORDER, RECURRENT EPISODE, MODERATE (H): ICD-10-CM

## 2024-12-16 PROCEDURE — 90791 PSYCH DIAGNOSTIC EVALUATION: CPT | Mod: 95 | Performed by: PSYCHOLOGIST

## 2024-12-16 ASSESSMENT — COLUMBIA-SUICIDE SEVERITY RATING SCALE - C-SSRS
TOTAL  NUMBER OF ABORTED OR SELF INTERRUPTED ATTEMPTS LIFETIME: NO
2. HAVE YOU ACTUALLY HAD ANY THOUGHTS OF KILLING YOURSELF?: NO
ATTEMPT LIFETIME: NO
1. HAVE YOU WISHED YOU WERE DEAD OR WISHED YOU COULD GO TO SLEEP AND NOT WAKE UP?: YES
TOTAL  NUMBER OF INTERRUPTED ATTEMPTS LIFETIME: NO
1. IN THE PAST MONTH, HAVE YOU WISHED YOU WERE DEAD OR WISHED YOU COULD GO TO SLEEP AND NOT WAKE UP?: NO
6. HAVE YOU EVER DONE ANYTHING, STARTED TO DO ANYTHING, OR PREPARED TO DO ANYTHING TO END YOUR LIFE?: NO
REASONS FOR IDEATION LIFETIME: MOSTLY TO END OR STOP THE PAIN (YOU COULDN'T GO ON LIVING WITH THE PAIN OR HOW YOU WERE FEELING)

## 2024-12-16 ASSESSMENT — PATIENT HEALTH QUESTIONNAIRE - PHQ9
SUM OF ALL RESPONSES TO PHQ QUESTIONS 1-9: 13
10. IF YOU CHECKED OFF ANY PROBLEMS, HOW DIFFICULT HAVE THESE PROBLEMS MADE IT FOR YOU TO DO YOUR WORK, TAKE CARE OF THINGS AT HOME, OR GET ALONG WITH OTHER PEOPLE: VERY DIFFICULT
SUM OF ALL RESPONSES TO PHQ QUESTIONS 1-9: 13
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
## 2024-12-18 ENCOUNTER — DOCUMENTATION ONLY (OUTPATIENT)
Dept: PSYCHOLOGY | Facility: CLINIC | Age: 29
End: 2024-12-18
Payer: COMMERCIAL

## 2024-12-18 NOTE — PROGRESS NOTES
Name: Ailin Calhoun  MRN:?[id number]  :?1995    Client completed the [place] Multiphasic Personality Inventory-3 (MMPI-3), a self-report personality inventory, as part of her evaluation. Validity scales indicate that the client was able to comprehend and respond relevantly to the test items. She presents with multiple somatic complaints and is preoccupied with physical health concerns. She reports poor health and fatigue. She is prone to developing physical symptoms in response to stress. She reports feeling sad and unhappy and being dissatisfied with current life circumstances. She reports being passive, indecisive, and inefficacious. She is prone to rumination and is self-disparaging and intropunitive. She feels she is a burden to others. She is reporting diffuse cognitive difficulties including problems with memory, confusion and attention and concentration. Her profile is indicative of significant emotional distress. She reports experiencing negative emotions including anxiety, anger, and fear. She reports engaging in compulsive behavior including repetitive checking.  She reports an above average level of stress. She is prone to worry and rumination. She reports engaging in non-planful, impulsive behaviors. She reports not enjoying social events and avoiding social situations. She reports being shy, easily embarrassed and uncomfortable around others. She reports conflictual family relationships and lack of support from family members. She reports not liking people and avoiding social situations. She reports not enjoying social events and avoiding social situations. She reports being shy, easily embarrassed and uncomfortable around others. Client endorsed experiencing episodes of increased excitation and energy level.

## 2025-01-07 ENCOUNTER — DOCUMENTATION ONLY (OUTPATIENT)
Dept: PSYCHOLOGY | Facility: CLINIC | Age: 30
End: 2025-01-07
Payer: COMMERCIAL

## 2025-01-07 ENCOUNTER — VIRTUAL VISIT (OUTPATIENT)
Dept: PSYCHOLOGY | Facility: CLINIC | Age: 30
End: 2025-01-07
Attending: FAMILY MEDICINE
Payer: COMMERCIAL

## 2025-01-07 DIAGNOSIS — F41.1 GENERALIZED ANXIETY DISORDER: Primary | ICD-10-CM

## 2025-01-07 DIAGNOSIS — F33.1 MAJOR DEPRESSIVE DISORDER, RECURRENT EPISODE, MODERATE (H): ICD-10-CM

## 2025-01-07 PROCEDURE — 90832 PSYTX W PT 30 MINUTES: CPT | Mod: 95 | Performed by: PSYCHOLOGIST
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
## 2025-01-07 NOTE — PROGRESS NOTES
"Name: Ailin Calhoun  MRN:?[id number]  :?1995          Jun Adult ADHD Rating Scale-IV: Self and Other Reports (BAARS-IV)   The BAARS-IV assesses for symptoms of ADHD that are experienced in one's daily life. This assessment measure includes self and collateral rating scales designed to provide information regarding current and childhood symptoms of ADHD including inattention, hyperactivity, and impulsivity. Self-report scores are reported as percentiles. Scores at the 76th-83rd percentile are considered marginal, scores at the 84th-92nd percentile are considered borderline, scores at the 93rd-95th percentile are considered mild, scores at the 96th-98th percentile are considered moderate, and those at the 99th percentile are considered severe. Collateral or \"other\" rating scales are reported as number of symptoms observed in comparison to those reported by the client. Norms and percentile scores are not available for collateral reports.    ?   Current Symptoms Scale--Self Report:    Client completed the self-report inventory of current symptoms. The results indicate that the client's Total ADHD Score was 47 which places them in the 97th percentile for overall ADHD symptoms. In addition, the client endorsed the following occur \"often\" or \"very often\": 8/9 (98th percentile) Inattention symptoms, 3/9 (93rd percentile) Hyperactivity/Impulsivity symptoms, and 6/9 (96th percentile) Sluggish Cognitive Tempo symptoms. Overall, the results suggest the client is reporting moderate symptoms of inattention and mild symptoms of hyperactivity/impulsivity at this time.    ?   Current Symptoms Scale--Other Report:   Client's spouse completed the collateral report inventory of current symptoms. Based on the collateral contact's observation of symptoms, the client demonstrates the following \"often\" or \"very often\": 5/9 Inattention symptoms, 1/5 Hyperactivity symptoms, 3/4 Impulsivity symptoms, and 3/9 Sluggish Cognitive " "Tempo symptoms. The collateral report's Total ADHD Score was 48. The collateral- and self-report scores are similar and suggest Client experiences symptoms of inattention and hyperactivity/impulsivity at this time.    Childhood Symptoms Scale--Self-Report:   Client completed the self-report inventory of childhood symptoms. The results indicate that the client's Total ADHD Score was 44 which places them in the 91st percentile for overall ADHD symptoms in childhood. In addition, the client endorsed having experienced the following \"often\" or \"very often\": 8/9 (98th percentile) Inattention symptoms and 1/9 (78th percentile) Hyperactivity-Impulsivity symptoms. Overall, the results suggest the client experienced moderate symptoms of inattention in childhood.    Childhood Symptoms Scale--Other Report:   Client did not submit a collateral report for childhood symptoms.    Jun Functional Impairment Scale: Self and Other Reports (BFIS)   The BFIS is used to assess an individuals' psychosocial impairment in major life/daily activities that may be due to a mental health disorder. This assessment measure includes self and collateral rating scales. Self-report scores are reported as percentiles. Scores at the 76th-83rd percentile are considered marginal, scores at the 84th-92nd percentile are considered borderline, scores at the 93rd-95th percentile are considered mild, scores at the 96th-98th percentile are considered moderate, and those at the 99th percentile are considered severe. Collateral or \"other\" rating scales are reported as number of symptoms observed in comparison to those reported by the client. Norms and percentile scores are not available for collateral reports.    ?   Results indicate the client identified impairment (scores at or greater than 93rd percentile) in the following areas: home-family, home-chores, social-friends, dating/marriage, money management, daily responsibilities, and health maintenance. " "The client's Mean Impairment Score was 5.21 (91st percentile) indicating the client is reporting borderline impairment in functioning across domains. Client's spouse completed the collateral report for this measure which demonstrated similar results (e.g., mean impairment score of 4.87). He noted impairment in the following areas: home-chores, social-friends, community activities, and daily responsibilities.     Jun Deficits in Executive Functioning Scale (BDEFS)   The BDEFS is a measure used for evaluating dimensions of adult executive functioning in daily life. This assessment measure includes self and collateral rating scales. Self-report scores are reported as percentiles. Scores at the 76th-83rd percentile are considered marginal, scores at the 84th-92nd percentile are considered borderline, scores at the 93rd-95th percentile are considered mild, scores at the 96th-98th percentile are considered moderate, and those at the 99th percentile are considered severe. Collateral or \"other\" rating scales are reported as number of symptoms observed in comparison to those reported by the client. Norms and percentile scores are not available for collateral reports.    ?   Results indicate the client's Total Executive Functioning Score was 230 (97th percentile). The ADHD-Executive Functioning Index score was 25 (92nd percentile). These scores suggest the client is reporting borderline to moderate deficits in executive functioning. Client endorsed the following deficits: self-management to time (severe); self-organization/problem-solving (severe); and self-motivation (moderate). Client's spouse completed the collateral report which demonstrated similar results.     Generalized Anxiety Disorder Questionnaire (BETH-7)   This questionnaire is designed to screen for anxiety in adults. Based on the client's score of 18, they are reporting severe symptoms of anxiety at this time. Client endorsed the following symptoms of " anxiety: feeling nervous/anxious/on edge; not being able to stop or control worrying; worrying too much about different things; trouble relaxing; restlessness; becoming easily annoyed or irritable and feeling afraid as if something awful might happen.    Patient Health Questionnaire- 9 (PHQ-9)    This questionnaire is designed to screen for depression in adults. Based on the client's score of 14, they are reporting moderate symptoms of depression at this time. Client endorsed the following symptoms of depression: little interest or pleasure in doing things; feeling down/depressed/hopeless; trouble falling asleep/staying asleep/sleeping too much; feeling tired or having little energy; poor appetite or overeating; feeling bad about self; poor concentration and feeling fidgety/restless.

## 2025-01-14 ENCOUNTER — VIRTUAL VISIT (OUTPATIENT)
Dept: PSYCHOLOGY | Facility: CLINIC | Age: 30
End: 2025-01-14
Payer: COMMERCIAL

## 2025-01-14 DIAGNOSIS — F41.1 GENERALIZED ANXIETY DISORDER: Primary | ICD-10-CM

## 2025-01-14 DIAGNOSIS — F33.1 MAJOR DEPRESSIVE DISORDER, RECURRENT EPISODE, MODERATE (H): ICD-10-CM

## 2025-01-14 DIAGNOSIS — F90.0 ATTENTION DEFICIT HYPERACTIVITY DISORDER, INATTENTIVE TYPE: ICD-10-CM

## 2025-01-14 PROCEDURE — 96131 PSYCL TST EVAL PHYS/QHP EA: CPT | Mod: 95 | Performed by: PSYCHOLOGIST

## 2025-01-14 PROCEDURE — 96130 PSYCL TST EVAL PHYS/QHP 1ST: CPT | Mod: 95 | Performed by: PSYCHOLOGIST

## 2025-01-14 NOTE — PROGRESS NOTES
Client Name:  Ailin Calhoun   Date: 1/14/25      Service Type: Individual (ADHD Evaluation feedback session)   ?   Session Start Time:  9:00   Session End Time: 9:16    ?   Session Length: 16 minutes    ?   Session #: (feedback)   ?   Attendees: Client attended alone     Telemedicine Visit: The patient's condition can be safely assessed and treated via synchronous audio and visual telemedicine encounter.      Reason for Telemedicine Visit: Services only offered telehealth    Originating Site (Patient Location): Patient's home      Distant Location (provider location):  Off-site    Consent:  The patient/guardian has verbally consented to: the potential risks and benefits of telemedicine (video visit) versus in person care; bill my insurance or make self-payment for services provided; and responsibility for payment of non-covered services.     Mode of Communication: Telephone Visit    As the provider I attest to compliance with applicable laws and regulations related to telemedicine.    ?   DATA   ?   ?   Treatment Objective(s) Addressed in This Session:    Provided feedback on ADHD evaluation. Reviewed test results in depth and answered client's questions. Client is diagnosed with ADHD Predominantly Inattentive Presentation; Generalized Anxiety Disorder; Major Depressive Disorder, Recurrent, Moderate. Reviewed ADHD Coping Skills Handout. This provider also completed full written report of evaluation, including integration of testing data, summary, and recommendations. Please see Documentation Only dated 1/7/25.  ?   Progress on / Status of Treatment Objective(s) / Homework:    Completed    ?   Intervention:   ADHD Evaluation feedback; Reviewed report (can be found in Documentation Only encounter dated 1/7/25); Client was appreciative of the feedback and expressed understanding of the diagnoses.     ?   ASSESSMENT: Current Emotional / Mental Status (status of significant symptoms):   Risk status (Self / Other harm  or suicidal ideation)   Client denies current fears or concerns for personal safety.   Client denies current or recent suicidal ideation or behaviors.   Client denies current or recent homicidal ideation or behaviors.   Client denies current or recent self-injurious behavior or ideation.   Client denies other safety concerns.   A safety and risk management plan has not been developed at this time, however client was given the after-hours number / 911 should there be a change in any of these risk factors.   ?   Appearance: unable to assess on phone  Eye Contact: unable to assess on phone   Psychomotor Behavior: unable to assess on phone   Attitude: Cooperative    Orientation: All   Speech   Rate / Production: Normal    Volume: Normal    Mood: Normal   Affect: Appropriate    Thought Content: Clear    Thought Form: Coherent Logical    Insight: Good    ?   Medication Review:   Client is prescribed Celexa by PCP.    Medication Compliance:   Yes  ?   Changes in Health Issues:   None reported.   ?   Chemical Use Review:   Substance Use: Chemical use reviewed, no active concerns identified.     Tobacco Use: No current tobacco use.    ?   Collateral Reports Completed:   Routed note to Care Team Member(s)   ?   PLAN: (Homework, other)   ?   Recommendations:     1. Schedule a medication evaluation with your physician. Medications are often beneficial in treating anxiety and depression symptoms as well as ADHD. It will be important that each condition is treated, as treatment for one without the other may lead to an increase in symptoms (e.g., treating ADHD, but not anxiety, can lead to increased anxiety).??   ???   2. Access resources through websites, books, and articles such as those provided in the Adult ADHD Symptom Management handout. ???   ???   3. Consider working with an ADHD  or individual therapist to learn skills to?assist with symptom management, as well as ways to improve relationships, etc. that may have  been impacted by your symptoms.??   ???   4. Individual therapy is recommended. Therapies focused on identifying and challenging problematic thought and behavior patterns while increasing the use of healthy coping skills has been found to be effective in treating anxiety and depression. It will be important to set goals in this therapy and work actively toward achieving short-term successes that lead to the completion of each goal. Action-oriented therapies, such as CBT and ACT (Acceptance and Commitment Therapy) are particularly recommended for the treatment of chronic anxiety and depression.    ??   5. The use of behavioral strategies such as diaphragmatic breathing, guided imagery, and mindfulness is often helpful in the management of anxiety symptoms.?      6. ?The following compensatory strategies may be useful to cope with reported inattention symptoms:??   a. Maintaining a predictable routine and structured environment that incorporates prioritized checklists and reminders (e.g., Post-Its).?   b. When completing tasks, try to focus on one task at a time and complete it in its entirety before moving on to the next task.?   c. Minimize background distractions when working on complex tasks. For example, TV, radio or ongoing conversations in the background may hinder ability to focus on the task at hand.?   d. Take regular breaks from tasks that require prolonged attention. In general, regular breaks from complex tasks can help prevent lapses in attention, which can result in errors.?   e. Outline the steps required to complete a task prior to beginning it, which can help ensure an organized approach. Use the outline to refer to throughout the task as a reminder of the steps to be completed.?   ?   Rosa Trujillo, PhD, LP?   Licensed Psychologist?     Psychological Testing   Billing/Services Summary       Testing Evaluation Services Base: 25839  (1st 60 mins) Add-on: [id number]  (each addtl 60 mins)   Record Review  and Clarify Referral Question   (7:20/7:40), (12/16/24) 20 minutes   Integration/Report Generation   (8:00/9:00), (12/18/24) - MMPI-3  (12:00/1:00), (1/7/25) - Jun Scales  (3:00/4:00), (1/7/25) - Report Writing 60 minutes  60 minutes  60 minutes   Interactive Feedback Session  (9:00/9:16), (1/14/25) 16 minutes   Total Time: 216 minutes (3 hours, 36 minutes)   Total Units: 1 3           Diagnosis(es): (ICD-10)  ADHD Predominantly Inattentive Presentation (F90.0)  Generalized Anxiety Disorder (F41.1)  Major Depressive Disorder, Recurrent, Moderate (F33.1)

## 2025-01-16 PROBLEM — F90.0 ADHD (ATTENTION DEFICIT HYPERACTIVITY DISORDER), INATTENTIVE TYPE: Status: ACTIVE | Noted: 2025-01-16

## 2025-01-26 DIAGNOSIS — F32.A DEPRESSION, UNSPECIFIED DEPRESSION TYPE: ICD-10-CM

## 2025-01-27 RX ORDER — CITALOPRAM HYDROBROMIDE 10 MG/1
10 TABLET ORAL DAILY
Qty: 90 TABLET | Refills: 3 | Status: SHIPPED | OUTPATIENT
Start: 2025-01-27

## 2025-02-14 ENCOUNTER — MYC MEDICAL ADVICE (OUTPATIENT)
Dept: FAMILY MEDICINE | Facility: CLINIC | Age: 30
End: 2025-02-14
Payer: COMMERCIAL

## 2025-02-14 DIAGNOSIS — F90.0 ADHD (ATTENTION DEFICIT HYPERACTIVITY DISORDER), INATTENTIVE TYPE: ICD-10-CM

## 2025-02-17 RX ORDER — DEXTROAMPHETAMINE SACCHARATE, AMPHETAMINE ASPARTATE MONOHYDRATE, DEXTROAMPHETAMINE SULFATE AND AMPHETAMINE SULFATE 5; 5; 5; 5 MG/1; MG/1; MG/1; MG/1
20 CAPSULE, EXTENDED RELEASE ORAL DAILY
Qty: 30 CAPSULE | Refills: 0 | Status: SHIPPED | OUTPATIENT
Start: 2025-02-18

## 2025-02-17 NOTE — TELEPHONE ENCOUNTER
reviewed - last fill 1/20 - ok for refill/dose increase    Lainey Tillman MD  [facility]

## 2025-03-11 ASSESSMENT — SLEEP AND FATIGUE QUESTIONNAIRES
HOW LIKELY ARE YOU TO NOD OFF OR FALL ASLEEP WHILE SITTING AND TALKING TO SOMEONE: WOULD NEVER DOZE
HOW LIKELY ARE YOU TO NOD OFF OR FALL ASLEEP WHILE SITTING QUIETLY AFTER LUNCH WITHOUT ALCOHOL: WOULD NEVER DOZE
HOW LIKELY ARE YOU TO NOD OFF OR FALL ASLEEP WHILE SITTING INACTIVE IN A PUBLIC PLACE: WOULD NEVER DOZE
HOW LIKELY ARE YOU TO NOD OFF OR FALL ASLEEP WHILE WATCHING TV: WOULD NEVER DOZE
HOW LIKELY ARE YOU TO NOD OFF OR FALL ASLEEP WHEN YOU ARE A PASSENGER IN A CAR FOR AN HOUR WITHOUT A BREAK: WOULD NEVER DOZE
HOW LIKELY ARE YOU TO NOD OFF OR FALL ASLEEP IN A CAR, WHILE STOPPED FOR A FEW MINUTES IN TRAFFIC: WOULD NEVER DOZE
HOW LIKELY ARE YOU TO NOD OFF OR FALL ASLEEP WHILE LYING DOWN TO REST IN THE AFTERNOON WHEN CIRCUMSTANCES PERMIT: MODERATE CHANCE OF DOZING
HOW LIKELY ARE YOU TO NOD OFF OR FALL ASLEEP WHILE SITTING AND READING: WOULD NEVER DOZE

## 2025-03-12 NOTE — PROGRESS NOTES
"New Medical Weight Management Consult    PATIENT:  Ailin Calhoun  MRN:         [id number]  :         1995  NAIF:         3/13/2025        I had the pleasure of seeing your patient, Ailin Calhoun. Full intake/assessment was done to determine barriers to weight loss success and develop a treatment plan. Ailin Calhoun is a 29 year old female interested in treatment of medical problems associated with excess weight. She has a height of 6' 0\", a weight of 254 lbs 0 oz, and the calculated Body mass index is 34.45 kg/m .    Ailin is a patient with early onset class I obesity with significant element of familial/genetic influence and with current health consequences. She does need aggressive weight loss plan due to desire to heal best after breast reduction surgery in the future.  Ailin Calhoun mostly eats during the evening and has a disorganized meal pattern.      Her problem is complicated by a hunger disorder and impaired metabolic perception      Review of the patient's history and habits today suggest that weight gain has been  worse since giving birth to her son 2 years ago.    Previous Interventions found to be helpful in the past for weight loss include some mild success the last 6 months (about 6 lbs down from peak weight) with self guided fitness/dietary changes..    We discussed a toolbox approach to weight management today and she is open to combining mindful, reduced calorie dietary therapy with increased mindfulness techniques, activity/exercise improvements to optimize their current and future health.  Medication assistance for appetite control was discussed today and on review of the risks/benefits for this patients health history, we've decided to start with appetite suppressant therapy.  She lacks coverage for injectable appetite suppressants and self pay options are not affordable for her. She's already on Adderall which possesses some stimulant appetite suppressant properties " and so we'll pair that with an evening dose of topamax if tolerated, ramping to 50mg/day if tolerated for now.     ASSESSMENT AND PLAN  Problem List Items Addressed This Visit    None  Visit Diagnoses       Class 2 obesity without serious comorbidity with body mass index (BMI) of 35.0 to 35.9 in adult, unspecified obesity type             Plan:  Welcome to your weight loss season. To start, we'll aim for mindful, protein rich meals through the day to stay just ahead of your appetite by about 20-40 minutes.  [place] meals in protein first but include all food groups. Hydrate well through the day with water to manage wastes and optimize fat burning/wellness and reducing risks for kidney and gallstones.     2. With your 3 workouts weekly at the [facility], aiming for 1800-1900kcal/day with 100 grams of lean protein and 100 oz of water daily would be an excellent starting point for consistent, well nourished weight loss.      3. Continue your great exercise routines and find some fun family outings and fitness regularly. Over 150minutes/week of fitness is needed for feeling our best/slowing the aging process down.     4. Take Adderall after your first meal of the day. We'll add topamax with supper if tolerated:  start one tablet for 2 weeks and then increase, if tolerated well to 2 tablets (50mg) with supper. See handout below. Continue to avoid pregnancy while on appetite suppressants. Stop topamax if major mood swing/severe tingling sensations, painful or blood urination.     5. Check labs at your convenience.    6. Follow up with our Dietician a couple times between now and our follow up.     7. Aiming to see 1-2 lbs/week of weight loss when everything is working well together. Goal to stabilize BMI under 28 long term.             60 minutes spent by me on the date of the encounter doing chart review, history and exam, documentation and further activities per the note        She has the following co-morbidities:         "3/13/2025    10:20 AM   --   I have the following health issues associated with obesity None of the above   I have the following symptoms associated with obesity Depression    Back Pain           3/13/2025    10:20 AM   Patient Goals   If yes, please indicate which surgery? Breast reduction   Breast reduction consultant recommended some additional weight reduction. Son is now 2 years old.  No more kids planned.      3/13/2025    10:20 AM   Referring Provider   Please name the provider who referred you to Medical Weight Management  If you do not know, please answer \"I Don't Know\" I dont know           3/13/2025    10:20 AM   Weight History   How concerned are you about your weight? Somewhat Concerned   I became overweight As an Adult   The following factors have contributed to my weight gain Mental Health Issues    Eating Wrong Types of Food    Genetic (Runs in the Family)    Stress   I have tried the following methods to lose weight Watching Portions or Calories    Exercise    Fasting   My lowest weight since age 19 was 185   My highest weight since age 19 was 260   The most weight I have ever lost was (lbs) 30   I have the following family history of obesity/being overweight My mother is overweight    One or more of my siblings are overweight    Many of my relatives are overweight   How has your weight changed over the last year? Gained   How many pounds? 30   Genetic risks for obesity exist.   Recent weight gain she attributes to  difficulty since giving birth to her son 2 years ago.   Down about 6 lbs from her peak        3/13/2025    10:20 AM   Diet Recall Review with Patient   If you do eat breakfast, what types of food do you eat? [place]. Nuts, fruit   If you do eat lunch, what types of food do you typically eat? Sandwiches, veggies, grazing plates   How many glasses of juice do you drink in a typical day? 0   How many of glasses of milk do you drink in a typical day? 0   How many 8oz glasses of sugar " containing drinks such as Daniel-Aid/sweet tea do you drink in a day? 0   How many cans/bottles of diet pop/soda/tea or sports drink do you drink in a day? 1   How often do you have a drink of alcohol? Never           3/13/2025    10:20 AM   Eating Habits   Generally, my meals include foods like these bread, pasta, rice, potatoes, corn, crackers, sweet dessert, pop, or juice A Few Times a Week   Generally, my meals include foods like these fried meats, brats, burgers, french fries, pizza, cheese, chips, or ice cream Once a Week   Eat fast food (like McDonal[employer], [employer] [place], [employer]) Less Than Weekly   Eat at a buffet or sit-down restaurant Never   Eat most of my meals in front of the TV or computer Less Than Weekly   Often skip meals, eat at random times, have no regular eating times A Few Times a Week   Rarely sit down for a meal but snack or graze throughout A Few Times a Week   Eat extra snacks between meals Less Than Weekly   Eat most of my food at the end of the day Less Than Weekly   Eat in the middle of the night or wake up at night to eat Never   Eat extra snacks to prevent or correct low blood sugar Never   Eat to prevent acid reflux or stomach pain Never   Worry about not having enough food to eat Never   I eat when I am depressed Less Than Weekly   I eat when I am stressed Once a Week   I eat when I am bored Never   I eat when I am anxious Once a Week   I eat when I am happy or as a reward Never   I feel hungry all the time even if I just have eaten Never   Feeling full is important to me Never   I finish all the food on my plate even if I am already full Never   I can't resist eating delicious food or walk past the good food/smell Never   I eat/snack without noticing that I am eating Less Than Weekly   I eat when I am preparing the meal Less Than Weekly   I eat more than usual when I see others eating Never   I have trouble not eating sweets, ice cream, cookies, or chips if they are around the house Never    I think about food all day Never   What foods, if any, do you crave? Sweets/Candy/Chocolate   Mostly good behaviors with food/meals. Low emotional or distracted eating tendencies.        3/13/2025    10:20 AM   Amount of Food   I feel out of control when eating Never   I eat a large amount of food, like a loaf of bread, a box of cookies, a pint/quart of ice cream, all at once Monthly   I eat a large amount of food even when I am not hungry Never   I eat rapidly Never   I eat alone because I feel embarrassed and do not want others to see how much I have eaten Never   I eat until I am uncomfortably full Never   I feel bad, disgusted, or guilty after I overeat Monthly   Binge eating tendencies are absent.         3/13/2025    10:20 AM   Activity/Exercise History   How much of a typical 12 hour day do you spend sitting? Less Than Half the Day   How much of a typical 12 hour day do you spend lying down? Less Than Half the Day   How much of a typical day do you spend walking/standing? Half the Day   How many hours (not including work) do you spend on the TV/Video Games/Computer/Tablet/Phone? 2-3 Hours   How many times a week are you active for the purpose of exercise? 2-3 Times a Week   What keeps you from being more active? Shortness of Breath    Lack of Time   How many total minutes do you spend doing some activity for the purpose of exercising when you exercise? Less Than 15 Minutes   2-3x weekly works out at [place]. 45  minutes on treadmill 2.5-3miles and 3% incline. Back strain/herniated disc during pregnancy so limits strength work. Dog walking. Will do leg presses  TDEE of 2550-2600kcal/day currently.   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 100%   How many hours do you spend commuting to work daily? 15 minutes   What is your marital status? /In a Relationship   If in a relationship, is your significant other overweight? No   If you have children, are they overweight? No   Who do you live with?  and son   Who does the food shopping? Myself and    Son is 2 years old.  She's been again for the last half year. [facility] W[facility] at 4am, bed at 8:30pm. .   Works until 9:30am, \"fun money\" job. Will pack some fruit. Water. Trail mix.  Works 5 days weekly.       3/13/2025    10:20 AM   Mental Health History Reviewed With Patient   Have you ever been physically or sexually abused? No   How often in the past 2 weeks have you felt little interest or pleasure in doing things? Not at all   Over the past 2 weeks how often have you felt down, depressed, or hopeless? Not at all           3/13/2025    10:20 AM   Sleep History Reviewed With Patient   How many hours do you sleep at night? 7   STOPBANG 0, ESS of 2 makes FELICE unlikely at this point in her life. Will defer sleep evaluation unless new symptomology occurs.    Past Surgical History:   Procedure Laterality Date    HEMILAMINECTOMY, DISCECTOMY LUMBAR ONE LEVEL, COMBINED N/A 9/17/2022    Procedure: HEMILAMINECTOMY, SPINE, LUMBAR, 1 LEVEL, WITH DISCECTOMY;  Surgeon: Barrie Owen MD;  Location: [facility] OR    LAMINECTOMY LUMBAR ONE LEVEL N/A 9/17/2022    Procedure: LAMINECTOMY, SPINE, LUMBAR, 1 LEVEL;  Surgeon: Barrie Owen MD;  Location: [facility] OR       Social History     Socioeconomic History    Marital status:      Spouse name: Not on file    Number of children: Not on file    Years of education: Not on file    Highest education level: Not on file   Occupational History    Not on file   Tobacco Use    Smoking status: " Never    Smokeless tobacco: Never   Vaping Use    Vaping status: Never Used   Substance and Sexual Activity    Alcohol use: Not Currently    Drug use: Never    Sexual activity: Yes     Partners: Female     Birth control/protection: Pill, None   Other Topics Concern    Not on file   Social History Narrative    ** Merged History Encounter **          , No children  Nilton Pantoja MD         Social Drivers of Health     Financial Resource Strain: Low Risk  (10/11/2023)    Financial Resource Strain     Within the past 12 months, have you or your family members you live with been unable to get utilities (heat, electricity) when it was really needed?: No   Food Insecurity: Low Risk  (10/11/2023)    Food Insecurity     Within the past 12 months, did you worry that your food would run out before you got money to buy more?: No     Within the past 12 months, did the food you bought just not last and you didn t have money to get more?: No   Transportation Needs: Low Risk  (10/11/2023)    Transportation Needs     Within the past 12 months, has lack of transportation kept you from medical appointments, getting your medicines, non-medical meetings or appointments, work, or from getting things that you need?: No   Physical Activity: Not on file   Stress: Not on file   Social Connections: Not on file   Interpersonal Safety: Low Risk  (10/12/2023)    Interpersonal Safety     Do you feel physically and emotionally safe where you currently live?: Yes     Within the past 12 months, have you been hit, slapped, kicked or otherwise physically hurt by someone?: No     Within the past 12 months, have you been humiliated or emotionally abused in other ways by your partner or ex-partner?: No   Housing Stability: Low Risk  (10/11/2023)    Housing Stability     Do you have housing? : Yes     Are you worried about losing your housing?: No       MEDICATIONS:   Current Outpatient Medications   Medication Sig Dispense Refill    albuterol  "(PROAIR HFA/PROVENTIL HFA/VENTOLIN HFA) 108 (90 Base) MCG/ACT inhaler INHALE 2 PUFFS INTO THE LUNGS EVERY 4 HOURS AS NEEDED FOR SHORTNESS OF BREATH OR WHEEZING OR COUGH 8.5 g 0    albuterol (PROVENTIL) (2.5 MG/3ML) 0.083% neb solution Take 2 vials (5 mg) by nebulization every 6 hours as needed for shortness of breath / dyspnea or wheezing 75 mL 0    amphetamine-dextroamphetamine (ADDERALL XR) 20 MG 24 hr capsule Take 1 capsule (20 mg) by mouth daily. 30 capsule 0    citalopram (CELEXA) 10 MG tablet TAKE 1 TABLET(10 MG) BY MOUTH DAILY 90 tablet 3    citalopram (CELEXA) 20 MG tablet TAKE 1 TABLET(20 MG) BY MOUTH DAILY 90 tablet 3    cyclobenzaprine (FLEXERIL) 5 MG tablet Take 1-2 tablets (5-10 mg) by mouth 3 times daily as needed for muscle spasms. 60 tablet 1    hydrOXYzine HCl (ATARAX) 25 MG tablet Take 1-4 tablets ( mg) by mouth nightly as needed for itching (Patient not taking: Reported on 1/17/2025) 90 tablet 1    montelukast (SINGULAIR) 10 MG tablet TAKE 1 TABLET(10 MG) BY MOUTH AT BEDTIME 90 tablet 3    norethindrone (MICRONOR) 0.35 MG tablet TAKE 1 TABLET(0.35 MG) BY MOUTH DAILY 84 tablet 2     Her stimulant can encourage under nourishment in the first half of her day and increase comfort/impulsive eating tendencies later in the day/evening once it's worn off if undernourished. Some risks for interaction with her Celexa and flexeril, she finds her meds to be well tolerated. Only rarely uses her Flexeril..   ALLERGIES:   Allergies   Allergen Reactions    Aspirin Difficulty breathing, Hives, Itching, Nausea, Nausea and Vomiting and Shortness Of Breath    Ibuprofen Difficulty breathing, Fatigue, Headache, Itching, Nausea, Nausea and Vomiting and Shortness Of Breath    Aspirin     Ibuprofen    No results found for: \"TSH\"  Lab Results   Component Value Date    A1C 5.3 05/04/2022     Recent Labs   Lab Test 03/23/22  0912   CHOL 148   HDL 41*   LDL 91   TRIG 78     Last Comprehensive Metabolic Panel:  Lab " Results   Component Value Date     09/08/2024    POTASSIUM 3.6 09/08/2024    CHLORIDE 100 09/08/2024    CO2 26 09/08/2024    ANIONGAP 10 09/08/2024     (H) 09/08/2024    BUN 5.3 (L) 09/08/2024    CR 0.78 09/08/2024    GFRESTIMATED >90 09/08/2024    SRINIVAS 9.8 09/08/2024       Liver Function Studies -   Recent Labs   Lab Test 09/08/24  2207   PROTTOTAL 7.6   ALBUMIN 4.6   BILITOTAL 0.2   ALKPHOS 65   AST 22   ALT 20     Lab Results   Component Value Date    WBC 7.5 09/08/2024     Lab Results   Component Value Date    RBC 4.59 09/08/2024     Lab Results   Component Value Date    HGB 12.8 09/08/2024     Lab Results   Component Value Date    HCT 38.1 09/08/2024     Lab Results   Component Value Date    MCV 83 09/08/2024     Lab Results   Component Value Date    MCH 27.9 09/08/2024     Lab Results   Component Value Date    MCHC 33.6 09/08/2024     Lab Results   Component Value Date    RDW 12.5 09/08/2024     Lab Results   Component Value Date     09/08/2024             PHYSICAL EXAM:  Objective    Ht 1.829 m (6')   Wt 115.2 kg (254 lb)   BMI 34.45 kg/m    Vitals - Patient Reported  Pain Score: No Pain (0)      Vitals:  No vitals were obtained today due to virtual visit.    Physical Exam   GENERAL: alert and no distress  EYES: Eyes grossly normal to inspection.  No discharge or erythema, or obvious scleral/conjunctival abnormalities. Wears corrective lenses.  RESP: No audible wheeze, cough, or visible cyanosis.    SKIN: Visible skin clear. No significant rash, abnormal pigmentation or lesions.  NEURO: Cranial nerves grossly intact.  Mentation and speech appropriate for age.  PSYCH: Appropriate affect, tone, and pace of words        Sincerely,    Clark Handy MD

## 2025-03-13 ENCOUNTER — VIRTUAL VISIT (OUTPATIENT)
Dept: SURGERY | Facility: CLINIC | Age: 30
End: 2025-03-13
Payer: COMMERCIAL

## 2025-03-13 VITALS — HEIGHT: 72 IN | WEIGHT: 254 LBS | BODY MASS INDEX: 34.4 KG/M2

## 2025-03-13 DIAGNOSIS — E66.811 CLASS 1 OBESITY DUE TO EXCESS CALORIES WITHOUT SERIOUS COMORBIDITY WITH BODY MASS INDEX (BMI) OF 34.0 TO 34.9 IN ADULT: Primary | ICD-10-CM

## 2025-03-13 DIAGNOSIS — E66.09 CLASS 1 OBESITY DUE TO EXCESS CALORIES WITHOUT SERIOUS COMORBIDITY WITH BODY MASS INDEX (BMI) OF 34.0 TO 34.9 IN ADULT: Primary | ICD-10-CM

## 2025-03-13 DIAGNOSIS — E66.812 CLASS 2 OBESITY WITHOUT SERIOUS COMORBIDITY WITH BODY MASS INDEX (BMI) OF 35.0 TO 35.9 IN ADULT, UNSPECIFIED OBESITY TYPE: ICD-10-CM

## 2025-03-13 RX ORDER — TOPIRAMATE 25 MG/1
TABLET, FILM COATED ORAL
Qty: 180 TABLET | Refills: 1 | Status: SHIPPED | OUTPATIENT
Start: 2025-03-13

## 2025-03-13 ASSESSMENT — SLEEP AND FATIGUE QUESTIONNAIRES
HOW LIKELY ARE YOU TO NOD OFF OR FALL ASLEEP WHILE SITTING QUIETLY AFTER LUNCH WITHOUT ALCOHOL: WOULD NEVER DOZE
HOW LIKELY ARE YOU TO NOD OFF OR FALL ASLEEP WHILE LYING DOWN TO REST IN THE AFTERNOON WHEN CIRCUMSTANCES PERMIT: MODERATE CHANCE OF DOZING
HOW LIKELY ARE YOU TO NOD OFF OR FALL ASLEEP WHILE SITTING INACTIVE IN A PUBLIC PLACE: WOULD NEVER DOZE
HOW LIKELY ARE YOU TO NOD OFF OR FALL ASLEEP IN A CAR, WHILE STOPPED FOR A FEW MINUTES IN TRAFFIC: WOULD NEVER DOZE
HOW LIKELY ARE YOU TO NOD OFF OR FALL ASLEEP WHILE SITTING AND READING: WOULD NEVER DOZE
HOW LIKELY ARE YOU TO NOD OFF OR FALL ASLEEP WHEN YOU ARE A PASSENGER IN A CAR FOR AN HOUR WITHOUT A BREAK: WOULD NEVER DOZE
HOW LIKELY ARE YOU TO NOD OFF OR FALL ASLEEP WHILE WATCHING TV: WOULD NEVER DOZE
HOW LIKELY ARE YOU TO NOD OFF OR FALL ASLEEP WHILE SITTING AND TALKING TO SOMEONE: WOULD NEVER DOZE

## 2025-03-13 ASSESSMENT — PAIN SCALES - GENERAL: PAINLEVEL_OUTOF10: NO PAIN (0)

## 2025-03-13 NOTE — LETTER
"3/13/2025      Ailin Calhoun  1966 [facility] [address]      Dear Colleague,    Thank you for referring your patient, Ailin Calhoun, to the [facility]. Please see a copy of my visit note below.    New Medical Weight Management Consult    PATIENT:  Ailin Calhoun  MRN:         [id number]  :         1995  NAIF:         3/13/2025        I had the pleasure of seeing your patient, Ailin Calhoun. Full intake/assessment was done to determine barriers to weight loss success and develop a treatment plan. Ailin Calhoun is a 29 year old female interested in treatment of medical problems associated with excess weight. She has a height of 6' 0\", a weight of 254 lbs 0 oz, and the calculated Body mass index is 34.45 kg/m .    Ailin is a patient with early onset class I obesity with significant element of familial/genetic influence and with current health consequences. She does need aggressive weight loss plan due to desire to heal best after breast reduction surgery in the future.  Ailin Calhoun mostly eats during the evening and has a disorganized meal pattern.      Her problem is complicated by a hunger disorder and impaired metabolic perception      Review of the patient's history and habits today suggest that weight gain has been  worse since giving birth to her son 2 years ago.    Previous Interventions found to be helpful in the past for weight loss include some mild success the last 6 months (about 6 lbs down from peak weight) with self guided fitness/dietary changes..    We discussed a toolbox approach to weight management today and she is open to combining mindful, reduced calorie dietary therapy with increased mindfulness techniques, activity/exercise improvements to optimize their current and future health.  Medication assistance for appetite control was discussed today and on review of the risks/benefits for this patients " health history, we've decided to start with appetite suppressant therapy.  She lacks coverage for injectable appetite suppressants and self pay options are not affordable for her. She's already on Adderall which possesses some stimulant appetite suppressant properties and so we'll pair that with an evening dose of topamax if tolerated, ramping to 50mg/day if tolerated for now.     ASSESSMENT AND PLAN  Problem List Items Addressed This Visit    None  Visit Diagnoses       Class 2 obesity without serious comorbidity with body mass index (BMI) of 35.0 to 35.9 in adult, unspecified obesity type             Plan:  Welcome to your weight loss season. To start, we'll aim for mindful, protein rich meals through the day to stay just ahead of your appetite by about 20-40 minutes.  [place] meals in protein first but include all food groups. Hydrate well through the day with water to manage wastes and optimize fat burning/wellness and reducing risks for kidney and gallstones.     2. With your 3 workouts weekly at the [facility], aiming for 1800-1900kcal/day with 100 grams of lean protein and 100 oz of water daily would be an excellent starting point for consistent, well nourished weight loss.      3. Continue your great exercise routines and find some fun family outings and fitness regularly. Over 150minutes/week of fitness is needed for feeling our best/slowing the aging process down.     4. Take Adderall after your first meal of the day. We'll add topamax with supper if tolerated:  start one tablet for 2 weeks and then increase, if tolerated well to 2 tablets (50mg) with supper. See handout below. Continue to avoid pregnancy while on appetite suppressants. Stop topamax if major mood swing/severe tingling sensations, painful or blood urination.     5. Check labs at your convenience.    6. Follow up with our Dietician a couple times between now and our follow up.     7. Aiming to see 1-2 lbs/week of weight loss when everything is  "working well together. Goal to stabilize BMI under 28 long term.             60 minutes spent by me on the date of the encounter doing chart review, history and exam, documentation and further activities per the note        She has the following co-morbidities:        3/13/2025    10:20 AM   --   I have the following health issues associated with obesity None of the above   I have the following symptoms associated with obesity Depression    Back Pain           3/13/2025    10:20 AM   Patient Goals   If yes, please indicate which surgery? Breast reduction   Breast reduction consultant recommended some additional weight reduction. Son is now 2 years old.  No more kids planned.      3/13/2025    10:20 AM   Referring Provider   Please name the provider who referred you to Medical Weight Management  If you do not know, please answer \"I Don't Know\" I dont know           3/13/2025    10:20 AM   Weight History   How concerned are you about your weight? Somewhat Concerned   I became overweight As an Adult   The following factors have contributed to my weight gain Mental Health Issues    Eating Wrong Types of Food    Genetic (Runs in the Family)    Stress   I have tried the following methods to lose weight Watching Portions or Calories    Exercise    Fasting   My lowest weight since age 19 was 185   My highest weight since age 19 was 260   The most weight I have ever lost was (lbs) 30   I have the following family history of obesity/being overweight My mother is overweight    One or more of my siblings are overweight    Many of my relatives are overweight   How has your weight changed over the last year? Gained   How many pounds? 30   Genetic risks for obesity exist.   Recent weight gain she attributes to  difficulty since giving birth to her son 2 years ago.   Down about 6 lbs from her peak        3/13/2025    10:20 AM   Diet Recall Review with Patient   If you do eat breakfast, what types of food do you eat? [place]. Nuts, " fruit   If you do eat lunch, what types of food do you typically eat? Sandwiches, veggies, grazing plates   How many glasses of juice do you drink in a typical day? 0   How many of glasses of milk do you drink in a typical day? 0   How many 8oz glasses of sugar containing drinks such as Daniel-Aid/sweet tea do you drink in a day? 0   How many cans/bottles of diet pop/soda/tea or sports drink do you drink in a day? 1   How often do you have a drink of alcohol? Never           3/13/2025    10:20 AM   Eating Habits   Generally, my meals include foods like these bread, pasta, rice, potatoes, corn, crackers, sweet dessert, pop, or juice A Few Times a Week   Generally, my meals include foods like these fried meats, brats, burgers, french fries, pizza, cheese, chips, or ice cream Once a Week   Eat fast food (like McDonalds, [place], Taco Bell) Less Than Weekly   Eat at a buffet or sit-down restaurant Never   Eat most of my meals in front of the TV or computer Less Than Weekly   Often skip meals, eat at random times, have no regular eating times A Few Times a Week   Rarely sit down for a meal but snack or graze throughout A Few Times a Week   Eat extra snacks between meals Less Than Weekly   Eat most of my food at the end of the day Less Than Weekly   Eat in the middle of the night or wake up at night to eat Never   Eat extra snacks to prevent or correct low blood sugar Never   Eat to prevent acid reflux or stomach pain Never   Worry about not having enough food to eat Never   I eat when I am depressed Less Than Weekly   I eat when I am stressed Once a Week   I eat when I am bored Never   I eat when I am anxious Once a Week   I eat when I am happy or as a reward Never   I feel hungry all the time even if I just have eaten Never   Feeling full is important to me Never   I finish all the food on my plate even if I am already full Never   I can't resist eating delicious food or walk past the good food/smell Never   I  eat/snack without noticing that I am eating Less Than Weekly   I eat when I am preparing the meal Less Than Weekly   I eat more than usual when I see others eating Never   I have trouble not eating sweets, ice cream, cookies, or chips if they are around the house Never   I think about food all day Never   What foods, if any, do you crave? Sweets/Candy/Chocolate   Mostly good behaviors with food/meals. Low emotional or distracted eating tendencies.        3/13/2025    10:20 AM   Amount of Food   I feel out of control when eating Never   I eat a large amount of food, like a loaf of bread, a box of cookies, a pint/quart of ice cream, all at once Monthly   I eat a large amount of food even when I am not hungry Never   I eat rapidly Never   I eat alone because I feel embarrassed and do not want others to see how much I have eaten Never   I eat until I am uncomfortably full Never   I feel bad, disgusted, or guilty after I overeat Monthly   Binge eating tendencies are absent.         3/13/2025    10:20 AM   Activity/Exercise History   How much of a typical 12 hour day do you spend sitting? Less Than Half the Day   How much of a typical 12 hour day do you spend lying down? Less Than Half the Day   How much of a typical day do you spend walking/standing? Half the Day   How many hours (not including work) do you spend on the TV/Video Games/Computer/Tablet/Phone? 2-3 Hours   How many times a week are you active for the purpose of exercise? 2-3 Times a Week   What keeps you from being more active? Shortness of Breath    Lack of Time   How many total minutes do you spend doing some activity for the purpose of exercising when you exercise? Less Than 15 Minutes   2-3x weekly works out at [employer]. 45  minutes on treadmill 2.5-3miles and 3% incline. Back strain/herniated disc during pregnancy so limits strength work. Dog walking. Will do leg presses  TDEE of 2550-2600kcal/day currently.   PAST MEDICAL HISTORY:  Past Medical History:  "  Diagnosis Date     Asthma      Depressive disorder      Third-stage postpartum hemorrhage 12/4/2022    Total 850 ml 3 degree tear, started iron supplement , postpartum hemorrhage protocol started            3/13/2025    10:20 AM   Work/Social History Reviewed With Patient   My employment status is Part-Time    Stay at Home Parent   My job is Member service   How much of your job is spent on the computer or phone? 100%   How many hours do you spend commuting to work daily? 15 minutes   What is your marital status? /In a Relationship   If in a relationship, is your significant other overweight? No   If you have children, are they overweight? No   Who do you live with?  and son   Who does the food shopping? Myself and    Son is 2 years old.  She's been again for the last half year. Goes into the  in morning. Wakes at 4am, bed at 8:30pm. .   Works until 9:30am, \"fun money\" job. Will pack some fruit. Water. Trail mix.  Works 5 days weekly.       3/13/2025    10:20 AM   Mental Health History Reviewed With Patient   Have you ever been physically or sexually abused? No   How often in the past 2 weeks have you felt little interest or pleasure in doing things? Not at all   Over the past 2 weeks how often have you felt down, depressed, or hopeless? Not at all           3/13/2025    10:20 AM   Sleep History Reviewed With Patient   How many hours do you sleep at night? 7   STOPBANG 0, ESS of 2 makes FELICE unlikely at this point in her life. Will defer sleep evaluation unless new symptomology occurs.    Past Surgical History:   Procedure Laterality Date     HEMILAMINECTOMY, DISCECTOMY LUMBAR ONE LEVEL, COMBINED N/A 9/17/2022    Procedure: HEMILAMINECTOMY, SPINE, LUMBAR, 1 LEVEL, WITH DISCECTOMY;  Surgeon: Brarie Owen MD;  Location: [facility] OR     LAMINECTOMY LUMBAR ONE LEVEL N/A 9/17/2022    Procedure: LAMINECTOMY, SPINE, LUMBAR, 1 LEVEL;  Surgeon: Barrie Owen MD;  Location: [facility] OR "       Social History     Socioeconomic History     Marital status:      Spouse name: Not on file     Number of children: Not on file     Years of education: Not on file     Highest education level: Not on file   Occupational History     Not on file   Tobacco Use     Smoking status: Never     Smokeless tobacco: Never   Vaping Use     Vaping status: Never Used   Substance and Sexual Activity     Alcohol use: Not Currently     Drug use: Never     Sexual activity: Yes     Partners: Female     Birth control/protection: Pill, None   Other Topics Concern     Not on file   Social History Narrative    ** Merged History Encounter **          , No children  Nilton Pantoja MD         Social Drivers of Health     Financial Resource Strain: Low Risk  (10/11/2023)    Financial Resource Strain      Within the past 12 months, have you or your family members you live with been unable to get utilities (heat, electricity) when it was really needed?: No   Food Insecurity: Low Risk  (10/11/2023)    Food Insecurity      Within the past 12 months, did you worry that your food would run out before you got money to buy more?: No      Within the past 12 months, did the food you bought just not last and you didn t have money to get more?: No   Transportation Needs: Low Risk  (10/11/2023)    Transportation Needs      Within the past 12 months, has lack of transportation kept you from medical appointments, getting your medicines, non-medical meetings or appointments, work, or from getting things that you need?: No   Physical Activity: Not on file   Stress: Not on file   Social Connections: Not on file   Interpersonal Safety: Low Risk  (10/12/2023)    Interpersonal Safety      Do you feel physically and emotionally safe where you currently live?: Yes      Within the past 12 months, have you been hit, slapped, kicked or otherwise physically hurt by someone?: No      Within the past 12 months, have you been humiliated or  emotionally abused in other ways by your partner or ex-partner?: No   Housing Stability: Low Risk  (10/11/2023)    Housing Stability      Do you have housing? : Yes      Are you worried about losing your housing?: No       MEDICATIONS:   Current Outpatient Medications   Medication Sig Dispense Refill     albuterol (PROAIR HFA/PROVENTIL HFA/VENTOLIN HFA) 108 (90 Base) MCG/ACT inhaler INHALE 2 PUFFS INTO THE LUNGS EVERY 4 HOURS AS NEEDED FOR SHORTNESS OF BREATH OR WHEEZING OR COUGH 8.5 g 0     albuterol (PROVENTIL) (2.5 MG/3ML) 0.083% neb solution Take 2 vials (5 mg) by nebulization every 6 hours as needed for shortness of breath / dyspnea or wheezing 75 mL 0     amphetamine-dextroamphetamine (ADDERALL XR) 20 MG 24 hr capsule Take 1 capsule (20 mg) by mouth daily. 30 capsule 0     citalopram (CELEXA) 10 MG tablet TAKE 1 TABLET(10 MG) BY MOUTH DAILY 90 tablet 3     citalopram (CELEXA) 20 MG tablet TAKE 1 TABLET(20 MG) BY MOUTH DAILY 90 tablet 3     cyclobenzaprine (FLEXERIL) 5 MG tablet Take 1-2 tablets (5-10 mg) by mouth 3 times daily as needed for muscle spasms. 60 tablet 1     hydrOXYzine HCl (ATARAX) 25 MG tablet Take 1-4 tablets ( mg) by mouth nightly as needed for itching (Patient not taking: Reported on 1/17/2025) 90 tablet 1     montelukast (SINGULAIR) 10 MG tablet TAKE 1 TABLET(10 MG) BY MOUTH AT BEDTIME 90 tablet 3     norethindrone (MICRONOR) 0.35 MG tablet TAKE 1 TABLET(0.35 MG) BY MOUTH DAILY 84 tablet 2     Her stimulant can encourage under nourishment in the first half of her day and increase comfort/impulsive eating tendencies later in the day/evening once it's worn off if undernourished. Some risks for interaction with her Celexa and flexeril, she finds her meds to be well tolerated. Only rarely uses her Flexeril..   ALLERGIES:   Allergies   Allergen Reactions     Aspirin Difficulty breathing, Hives, Itching, Nausea, Nausea and Vomiting and Shortness Of Breath     Ibuprofen Difficulty breathing,  "Fatigue, Headache, Itching, Nausea, Nausea and Vomiting and Shortness Of Breath     Aspirin      Ibuprofen    No results found for: \"TSH\"  Lab Results   Component Value Date    A1C 5.3 05/04/2022     Recent Labs   Lab Test 03/23/22 0912   CHOL 148   HDL 41*   LDL 91   TRIG 78     Last Comprehensive Metabolic Panel:  Lab Results   Component Value Date     09/08/2024    POTASSIUM 3.6 09/08/2024    CHLORIDE 100 09/08/2024    CO2 26 09/08/2024    ANIONGAP 10 09/08/2024     (H) 09/08/2024    BUN 5.3 (L) 09/08/2024    CR 0.78 09/08/2024    GFRESTIMATED >90 09/08/2024    SRINIVAS 9.8 09/08/2024       Liver Function Studies -   Recent Labs   Lab Test 09/08/24  2207   PROTTOTAL 7.6   ALBUMIN 4.6   BILITOTAL 0.2   ALKPHOS 65   AST 22   ALT 20     Lab Results   Component Value Date    WBC 7.5 09/08/2024     Lab Results   Component Value Date    RBC 4.59 09/08/2024     Lab Results   Component Value Date    HGB 12.8 09/08/2024     Lab Results   Component Value Date    HCT 38.1 09/08/2024     Lab Results   Component Value Date    MCV 83 09/08/2024     Lab Results   Component Value Date    MCH 27.9 09/08/2024     Lab Results   Component Value Date    MCHC 33.6 09/08/2024     Lab Results   Component Value Date    RDW 12.5 09/08/2024     Lab Results   Component Value Date     09/08/2024             PHYSICAL EXAM:  Objective   Ht 1.829 m (6')   Wt 115.2 kg (254 lb)   BMI 34.45 kg/m    Vitals - Patient Reported  Pain Score: No Pain (0)      Vitals:  No vitals were obtained today due to virtual visit.    Physical Exam   GENERAL: alert and no distress  EYES: Eyes grossly normal to inspection.  No discharge or erythema, or obvious scleral/conjunctival abnormalities. Wears corrective lenses.  RESP: No audible wheeze, cough, or visible cyanosis.    SKIN: Visible skin clear. No significant rash, abnormal pigmentation or lesions.  NEURO: Cranial nerves grossly intact.  Mentation and speech appropriate for age.  PSYCH: " Appropriate affect, tone, and pace of words        Sincerely,    Clark Handy MD            Virtual Visit Details    Type of service:  Video Visit     Originating Location (pt. Location): Home    Distant Location (provider location):  On-site  Platform used for Video Visit: Clarence      Again, thank you for allowing me to participate in the care of your patient.        Sincerely,        Clark Handy MD    Electronically signed

## 2025-03-13 NOTE — PROGRESS NOTES
Virtual Visit Details    Type of service:  Video Visit     Originating Location (pt. Location): Home    Distant Location (provider location):  On-site  Platform used for Video Visit: Clarence

## 2025-03-13 NOTE — PATIENT INSTRUCTIONS
" Plan:  Welcome to your weight loss season. To start, we'll aim for mindful, protein rich meals through the day to stay just ahead of your appetite by about 20-40 minutes.  [place] meals in protein first but include all food groups. Hydrate well through the day with water to manage wastes and optimize fat burning/wellness and reducing risks for kidney and gallstones.     2. With your 3 workouts weekly at the [facility], aiming for 1800-1900kcal/day with 100 grams of lean protein and 100 oz of water daily would be an excellent starting point for consistent, well nourished weight loss.      3. Continue your great exercise routines and find some fun family outings and fitness regularly. Over 150minutes/week of fitness is needed for feeling our best/slowing the aging process down.     4. Take Adderall after your first meal of the day. We'll add topamax with supper if tolerated:  start one tablet for 2 weeks and then increase, if tolerated well to 2 tablets (50mg) with supper. See handout below. Continue to avoid pregnancy while on appetite suppressants. Stop topamax if major mood swing/severe tingling sensations, painful or blood urination.     5. Check labs at your convenience.    6. Follow up with our Dietician a couple times between now and our follow up.     7. Aiming to see 1-2 lbs/week of weight loss when everything is working well together. Goal to stabilize BMI under 28 long term.       What makes a person succeed with dramatic and sustained weight loss?    It's being at the right point in your life where you feel the need to lose the weight, not because anyone told you so but because of a voice inside of you that says, \"I am ready for this\".  You're now at a point where you may be feeling anxious, irritable and when you look in the mirror you do not recognize the person looking back.  Your healthy self is buried somewhere in that reflexion and you want to free it again.  This is the sort of motivation that leads to " "success, and it comes from you.    Because the only person that can lose that extra weight is you, I like my patients to focus on the mindset of being in Weight Loss Season.  This gives you permission to make the changes necessary to be consistent with the diet/activity and behavior changes that lead to successful, healthy weight loss.  Nearly any diet plan can work for weight loss, but keeping it healthy and nutrient based to prevent deficiencies/hair loss/fatigue or irritability is vital.  If you have a plan you want to try, we'll work with you to make sure no adjustments are needed to keep you healthy through your weight loss season and working with our Bariatric Dieticians you'll be given expert guidance to customize your diet plan to suit your particular needs. If you don't have your own ideas in mind, we are always happy to suggest well researched and validated plans that provide enough food to prevent hunger but still tap into your excess fat reserves and lose weight in a sustainable fashion.  There is great evidence that lean protein/healthy fat intake with good fiber intake while minimizing simple starches/carbs produces reliable/sustainable weight loss in most people. But some feel more connected to an intermittent fasting/fast mimicking or ketogenic diet.  These protocols can be hard for many to stick with and that's why we prefer the protein/healthy fat focused diets but if these alternative strategies appeal to you, we can work with you to optimize your knowledge and results with these tools.    Losing weight is a temporary commitment, but you need to be \"All In\" to have a good weight loss season.  To avoid frustration, you have to be willing to be on track 19 out of 20 days or even better than that. But, weight loss season is generally only 4-8 months in length. After that length of time, it can be hard to maintain a negative calorie balance and our brain, motivations and metabolism will usually bring " "you to a plateau that cannot be broken in this modern world where other commitments start to take priority. That's when we look to stabilize the weight loss you've achieved.  If you've reached your goal by that time, fantastic, and job well done.  If there is more to go, then after a few months of stabilization, we can usually attack that previous plateau and break into new territory.    Because of this time limitation, we want to really get to work right away and get into a sustainable routine ASAP.  One of the best predictors of how much weight you're going to lose throughout the season is how much you lose in the first 6 weeks, so prepare well and jump in with both feet.      Occasionally, people may feel like they cannot commit fully to the changes necessary and may want to change one thing at a time and \"get used to\" the idea of losing weight.  That is OK because that is where they are in their life, and they cannot fully commit for any number of reasons.  It's part of that internal motivation and they just haven't reached PRIORTY NUMBER ONE status yet. It's possible that what they need is more time to reach that point and I am always willing to work with people that want to \"dabble\", but understand, the amount of success obtained with half measures, is much less than half results. Behavior Change cannot occur until we prepare our minds, bodies and environment for what is to come, action!    As you go through your plan, look for things to keep your motivation rolling.  The most successful people have a goal or target/reward that they are working towards.  Having a reward that celebrates your new fitness, mobility and energy is the best sort because it will encourage you to do well with the weight maintenance phase and long term lifestyle changes that promotes keeping the weight off for the long term.  Usually, \"getting healthier\" or improving blood tests or losing weight so your clothes fit better is not as " internally motivating as having a tangible reward.  A good weight loss season reward is one that isn't food based, is affordable, but something special:  Something you won't be getting/doing unless you achieve your goal.   It s important to keep to the rule of success:  in order to get the reward, your goal MUST be achieved. Write this reward down, where you can look at it daily and keep it in the front of your mind as you go through your weight loss season and it will help keep you on track.    Tools that help change behavior are vital for success. The most studied and most supported tool for weight loss is nothing more than writing down your food and weight every day.  Every Day.  Accurately and completely.  When you commit to weight loss season, this information tells you whether you're getting ENOUGH food to fuel your weight loss properly as well as teaches you the interaction between different foods you eat and how your body responds with weight loss.  You'll see that sometimes after a heavy workout you don't see the scale move until 2-3 days later.  How saltier meals (chili for example) may make you retain water for 4-5 days before you see the weight come off, you'll get used to the mini-plateaus that develop after a good 3-8 lb drop in weight as well as how you break through if you keep working the diet as you should.    Weight loss is not a linear process, there are mini ups and downs.  Learning how your body loses weight and getting comfortable with that is very rewarding. The act of writing words on paper also solidifies your will power and commitment to the season of weight loss and that by itself changes your brain chemistry/appetite, motivation and prepares you for maximal success.     Behavior change is all about getting into a new routine.  The old habits and routine have to change because without changing the circumstances of how you gained your weight, it's unlikely you'll enjoy satisfying results.  If you have snacking habits, like every time you walk through the kitchen you grab a little something, well, that habit has to change and be replaced by a new habit.  It can be something as simple as keeping a doodle pad on the counter that you make a few scribbles and then walk through the kitchen having not opened the cupboard, or starting with a glass of water and leaving the kitchen without anything else, or checking your food journal to see how many calories you have left for the day.  Boredom is the enemy as are the old habits. Break new ground and try to push those old habits into a deep hole.  There are apps/counseling options available that can help with some of the day to day urges/behaviors if you're struggling. One commercial product that does a good job is Noom.  Unfortunately, there is a subscription fee.    Finally, exercise always helps.  While not mandatory to lose weight, every little bit helps and exercise has so many other benefits that to not work it into your plan is to miss out on all the mood, sleep, stress and general health benefits that come from making yourself a little short of breath and sweaty at least 3-4 days out of the week.  The metabolism and calorie burn benefits aside, almost every chronic ailment in medicine gets better with proper, aerobic exercise.  Allow yourself to start slow and let your body prepare itself to accept harder training 4-6 weeks down the road, but start now and commit to a plan.  Whether you have the means to hire a , join a gym or just walk out your front door or go down to your basement for a video workout, get into a exercise routine and  after 3 weeks of at least 3 times a week exercise you should be at a point where you can slowly start ramping up 10% each week to our goal of at least 150-300minutes weekly of aerobic exercise and at least twice weekly resistance training/strenghtening with weights/bands or body weight exercises.     I am a big fan  "of modifying the free training plan, \"Couch to 5k\", for almost all of my patients. Just type it into Google or look it up on your smart phone [employer] store.  To modify the plan,  you can use the training plan for whatever aerobic activity you do (bike/treadmill/elliptical/rower/pool/etc). During the \"jog\" intervals, you just move a little faster or harder or increase the tension or incline.  You use those little intervals to switch up the workout and recruit more muscles and pump the blood a little more and then recover again in the \"walk\" intervals by slowing down, decreasing the incline or turning down the tension.  3-4 days a week is not that much to ask and the benefits are enormous.  Start slow and develop the base from which you can then build on and reduce the risk of injury.  It's much more important 2-4 months from now to be enjoying your exercise then it is to over exert yourself at the start and hurt yourself.  Starting slowly allows your body to accept the training better down the road when the exercise becomes crucial for weight maintenance.  Without exercise down the road during your maintenance phase, all this hard work you are about to put in can be undone. It usually takes about 100-300 calories a day of exercise to maintain a weight loss and our focus during weight loss season is to generate the routine/activities and hobbies that make that enjoyable/sustainable.    Thanks for taking this first and most important step in your weight loss season.  Commit to it and we will cheerlead you all the way to success.  When things get tough or off track we'll offer guidance and analysis and when you reach your goal we'll celebrate your success.  In the end, it is all about your success, your health and what you do with it.      Clark Handy MD  [facility]  [phone]      LEAN PROTEIN SOURCES  Getting 20-30 grams of protein, 3 meals daily, is appropriate for most people, some " need more but more than about 40 grams per meal is not useful.  General rule is drinking one ounce of water per gram of protein eaten over the course of the day:  70 grams of protein each day, drink 70 oz of water.  Protein Source Portion Calories Grams of Protein                           Nonfat, plain Greek yogurt    (10 grams sugar or less) 3/4 cup (6 oz)  12-17   Light Yogurt (10 grams sugar or less) 3/4 cup (6 oz)  6-8   Protein Shake 1 shake 110-180 15-30   Skim/1% Milk or lactose-free milk 1 cup ( 8 oz)  8   Plain or light, flavored soymilk 1 cup  7-8   Plain or light, hemp milk 1 cup 110 6   Fat Free or 1% Cottage Cheese 1/2 cup 90 15   Part skim ricotta cheese 1/2 cup 100 14   Part skim or reduced fat cheese slices 1 ounce 65-80 8     Mozzarella String Cheese 1 80 8   Canned tuna, chicken, crab or salmon  (canned in water)  1/2 cup 100 15-20   White fish (broiled, grilled, baked) 3 ounces 100 21   [place]/Tuna (broiled, grilled, baked) 3 ounces 150-180 21   Shrimp, Scallops, Lobster, Crab 3 ounces 100 21   Pork loin, Pork Tenderloin 3 ounces 150 21   Boneless, skinless chicken /turkey breast                          (broiled, grilled, baked) 3 ounces 120 21   [place], [place], [place], and Venison 3 ounces 120 21   Lean cuts of red meat and pork (sirloin,   round, tenderloin, flank, ground 93%-96%) 3 ounces 170 21   Lean or Extra Lean Ground Turkey 1/2 cup 150 20   90-95% Lean Bassett Burger 1 debbi 140-180 21   Low-fat casserole with lean meat 3/4 cup 200 17   Luncheon Meats                                                        (turkey, lean ham, roast beef, chicken) 3 ounces 100 21   Egg (boiled, poached, scrambled) 1 Egg 60 7   Egg Substitute 1/2 cup 70 10   Nuts (limit to 1 serving per day)  3 Tbsp. 150 7   Nut Leota (peanut, almond)  Limit to 1 serving or less daily 1 Tbsp. 90 4   Soy Burger (varies) 1  15   Garbanzo, Black, Colunga Beans 1/2 cup 110 7   Refried Beans 1/2 cup 100  7   Kidney and Lima beans 1/2 cup 110 7   Tempeh 3 oz 175 18   Vegan crumbles 1/2 cup 100 14   Tofu 1/2 cup 110 14   Chili (beans and extra lean beef or turkey) 1 cup 200 23   Lentil Stew/Soup 1 cup 150 12   Black Bean Soup 1 cup 175 12         Example Meal Plan for a 3203-3519 Calorie Diet:    In order to fuel your weight loss properly and avoid hunger-induced overeating later in the day, for your height and weight, you will enjoy the most success by following the diet below or similar with adjustments based on your particular tastes and preferences.  Exercise may influence speed, amount of weight loss further.     I recommend getting into a meal routine and keeping it similar day to day in the beginning so you don t have to think too hard about what you re going to make/eat.  Keep snacks healthy, ideally containing protein and some vegetables.  Non-processed food is preferable to packaged items.  Eat at least a few crunchy green vegetables if having a snack, which should be 2-3 hours after your mealtimes(prepare these ahead of time for ease of use).  Drink 64 oz -80 oz of water daily for most, some of you will need more and we'll discuss it at your visit if that is the case.      When changing our diet,  we can often mistake thirst for hunger or just have some distracted eating habits that we need to break free from ('bored/mindless eating', screen time,work, driving,etc).  A glass of water and reconsideration of our hunger is often all that is needed.  Having the urge is not the problem, but watching it pass by without acting on it is the goal.    If you re having hunger problems, add a protein drink/snack to your morning hours or afternoon snack with at least 20grams of protein and not too much sugar (under 10g).  A carton of higher protein/low sugar yogurt can work as well.  If the urge to snack is overwhelming and not satiated, try going for a 10 minute walk/exercise, come home and drink a glass of water and  if still hungry, have a  calorie snack (handful of raw/sprouted nuts, veggies and string cheese, protein bar, etc).  Savor it.    It is better to have a large breakfast, a moderate lunch and a smaller dinner to fuel your day.  People lose 10-15% more weight during their weight loss season with this strategy. Optimizing your protein intake at each meal will further keep you more satisfied while eating less food overall.  Getting exercise in early has also been shown to offer the best results (before breakfast ideally but anytime is the right time to exercise if that is not an option for you).    To make sure you re getting adequate vitamins and minerals during weight loss, I recommend one complete multivitamin a day of your choice.  Consider a probiotic and taking some vitamin D 2000 IU daily.    Let supper be your last meal of the day and ideally try to have at least 12 hours between supper and breakfast the next day to tap into some beneficial overnight fasting dynamics.  Midnight snacks need to go away. Water in the evening is fine, unsweetened, non caffeinated herbal tea is helpful as well.  Consolidating your meals within a 8-12 hour period of your day will help tap into these additional metabolic benefits and tends to keep your appetite up for breakfast, further helping to stay on track.  For most of my patients, I don't recommend an intermittent fasting style diet (many find it hard to fit in their lifestyle) but an overnight fast is very doable for most patients and helps regulate our hunger drives a little better.  This makes it very important to nail good intake at all three meals to feel satisfied/energized and still lose weight.      If evening snacking desires are high, consider a glass of fiber supplement for some additional fullness (metamucil or similar). Most of us don't get the 25-30 grams per day of fiber that promotes good gut health/satiety.  Benefiber, metamucil, citrucel are  reasonable/affordable options for most people.  Inulin, chicory, psyllium husk are reasonable options but start slow and low in the dose to avoid gas/bloating until your gut gets acclimated (ramping up to 5-10 grams per day of supplemental fiber after 3-4 weeks if needed).      Example Meal Plan:  Breakfast: 450-475 Calories  1 egg cooked on low in olive oil:   calories.  5oz Greek Yogurt (Fage plain classic: ~150 ann)  Handful of Berries of your choice (about a calorie per berry or 20-40cal per handful)    cup(cooked) of  old fashioned oatmeal or 1/2 cup(cooked) steel cut oats. (150 ann)  Sprinkle amount of brown sugar and a pat of butter. (40 ann)  Glass of  Water  Black coffee or unsweetened Tea (0calories).      2-3 hours Later Snack: (195 calories).  Glass of water  One string Cheese (80 calories) or 4 oz creamed cottage cheese (115 calories) with  Crunchy Celery sticks (less than 10 calories per large stalk) 2 stalks. (20 calories)    of a  Large Banana or   of a Large Apple (60 calories):  eat second half at lunch or afternoon snack.     Lunch:300 -350 calories   Chicken Breast  (baked/broiled/roasted/grilled)  4-6 oz.  (125-180 ann), BBQ sauce/hot sauce/mustard/seasoning is free. Just use a reasonable amount. Or a can of tuna with 1 tablespoon mayonnaise.  Salad: lettuce, any other veggies (cucumbers, green peppers/celery you like and a small drizzle of dressing to just flavor.  Go as big on the veggies as you like,  as they are practically calorie free.   A whole, 8 inch cucumber is 45 calories, a whole green pepper is 23 calories, a stalk of celery is 9 calories.  [place] Dressing is 60 calories per tablespoon..so moderate your desired dressing or do a drizzle of olive oil and splash of balsamic vinegar on top,  Total calories unlikely to be over 150 even with dressing.  Glass of Water.    Option for lunch is meal replacement protein drink/smoothie.  Need at least 20 grams of protein and eat  the rest of your apple/banana from the morning snack.      Afternoon Snack: 150-200 calories   Cheese Stick or cottage cheese again  and a fresh fruit OR  Granola Bar (protein Bar acceptable if under 200 calories OR  Homemade smoothies:  8oz skim milk,  a handful of berries (fresh or frozen and a serving of protein powder such as BiPro or Nasrin sWhey for example.  If you don't like dairy, make with 8oz water, one small banana, handful of berries and the protein powder, add any veggies you want as well:  roughly 200 calories.   Glass of Water    Dinner: 325 calories  4oz of fresh, Atlantic salmon.  Broiled (salt/pepper/dill) for about 8-8.5 minutes (200calories) or  4oz filet mignon steak or sirloin steak  Salad or vegetable sautéed lightly in olive oil or   Broccoli 1.5  cups chopped and steamed  or micro-waved in a little water (75 calories)  Glass of Water,    Cup of herbal tea (unsweetened, caffeine free)      Herbs and seasonings are encouraged to flavor your foods/vegetables.  Make your food delicious.      Tips for Success:  1.  Prepare proteins ahead of time (broil chicken breasts in bulk so you can grab and go), steel cut oats/lentils can be stored in casserole dish/bowl in the fridge for quick scoop in the morning and rewarm in microwave, make use of crock pot recipes (watch salt content).  Making meals that cover 3-4 future meals is an easy way to stay on track.  2.  Drink a 8-12 oz glass of water every 2-3 hours when awake.  We often mistake hunger for thirst, especially when losing weight.  3. Remember your Reward and Motivation when things get hard.  4.  Weigh yourself every morning and record, you'll stay on track better and learn how our biorhythms, diet and elimination patterns show up on the scale. Don't worry about 1 or 2 day patterns, but when on track you'll notice good trend downward of weight over 3-4 day segments.  Plateaus tend to resolve after 4-8 days in most cases if you stay consistent  "with your plan.  These are natural and part of weight loss, even if you're perfect with your plan execution.  5. Call if problems/concerns.  [employer] is a great tool to stay in touch and provide weekly outside accountability. Check in with questions or if you want to brag.  6.  Find a handful of meals/foods that keep you on track and feeling good and get into a routine that is sustainable for you.  It's OK to have a routine that works for you.  7.  Consider taking a complete multivitamin just to make sure all micronutrients are adequate during weight loss.  8. If losing hair/brittle nails it usually means you are not taking enough protein.  Minimum goal is 60 grams daily of protein for smaller women, 80 grams a day for men. Consider taking Biotin as supplement or a \"Hair and Nail\" multivitamin.    Ailin, please note that the above illustration is not quite enough food for you, increase portions by 20% to hit about 1800kcal/day if using examples.      On-the-Go Breakfast Ideas  As of 2015, the latest research shows what a huge impact eating breakfast has on losing weight and feeling your best. People lose more weight when they make breakfast their biggest meal of the day compared to Dinner, but even if you cannot go to that degree, getting a breakfast that has at least 20 grams of protein and even a moderate amount of fat is ideal for maintaining good energy through the day and limits overeating in the evening hours.  The following are some quick and easy suggestions for at least getting something of substance into your body in the morning.  Enjoy!    Eating breakfast within 90 minutes of waking up is an important part of taking care of your body on a restricted calorie diet plan.  After sleeping for hours, your body is in need of fuel.  An ideal breakfast is a combination of protein, whole-grain carbohydrates, or fruit.  Here s why:    -Protein digests very slowly in the body, helping you feel more " satisfied.  -Whole grains provide dietary fiber, which also digests slowly and helps keep your gut clean.  -Fruit is a great source of vitamins, minerals, and fiber.     Each one of these breakfast combinations has between 200-300 calories and 15-20 grams of protein.  Feel free to mix and match!    Bone Broth (chicken bone broth or beef bone broth) is a great way to boost protein content. 8oz of bone broth will typically have 9-12grams of protein for 40kcal of energy.    Protein: Choose  -1/2 cup low-fat cottage cheese  -2 hard boiled eggs , or one cooked in olive oil (low/slow heat).  -1 low fat string cheese stick  -1 [employer] natural peanut butter  -Biologics Modular vegetarian sausage debbi (found in freezer section)  -1 slice lowfat cheese  -6 oz 2% or lowfat Greek yogurt, such as Fage or Oikos.    PLUS    Whole Grains:  Choose   -1 whole wheat English muffin  -1 whole wheat bucky, half  -1/2 Fiber One frozen muffin, thawed  -1/2 Fiber One toaster pastry  -1 whole wheat bagel thin  -1/2 cup Kashi cereal  -1 Kashi waffle (or other whole grain high-fiber waffle)  Aim for whole grain/sprouted breads with at least 3g of fiber/slice if having bread. [place] is one such brand.    OR    Fruit: Choose  -1/3 cup blueberries  -1/2 banana (or a plantain- similar to a banana, yet smaller)  -1/2 cup cantaloupe cubes  -1 small apple  -1 small orange  -1/2 cup strawberries  -handful raspberries/blackberries (each berry is about 1 calorie).    *Adapted from Diabetes [facility], Fall 20    Ten Breakfasts Under 250 calories    Ideally, getting between 350-600 calories  (depending on starting height and weight)for breakfast is ideal for avoiding hunger later in the day, adjust/add to the following accordingly:    One- 250 calories, 8.5 g protein  1 slice whole-grain toast   1 Tbsp peanut butter    banana    Two- 250 calories, 8 g protein    cup nonfat/lowfat yogurt  1/3rd cup diced no-sugar peaches  1/3rd cup cereal (like  Special K, Cheerios, or bran flakes)    Three- 250 calories, 25 g protein  1 egg scrambled with 1 oz skim milk    cup shredded cheddar    whole grain English muffin  1 oz [place] jeffers  1 tsp margarine spread    Four- 225 calories, 25 g protein  1/2 cup Kashi Go-Lean cereal    cup skim milk mixed with 1 scoop Bariatric Advantage protein powder    cup no-sugar diced pears    Five- 250 calories, 20 g protein    cup oatmeal prepared with skim milk, 1 scoop protein powder, and sugar-free maple syrup    Six- 200 calories, 5 g protein  1 whole grain waffle, toasted  1 tablespoon creamy peanut or almond butter    Seven-  250 calories, 19 g protein  Breakfast sandwich: 1 slice whole grain toast, cut in half.  Add 1 scrambled egg and one slice cheddar  cheese.    Eight-  250 calories, 15 g protein  2 eggs scrambled with 1/3 cup frozen spinach (heat before adding to eggs) and 2 tablespoons low fat cream cheese.    Nine-  150 calories, 15 g protein  2/3rd cup cottage cheese    cup cantaloupe    Ten- 200 calories, 20 g protein  Fruit smoothie made with 4 oz. nonfat Greek yogurt,   cup berries, 1 scoop protein powder, and 4 oz skim milk.    Ten Lunches Under 250 Calories    Aim for lunch to be around 300-400 calories a day when trying to lose weight and get that protein in!    One- 200 calories, 11 g protein  1/3 cup tuna salad made with light rodríguez on 1 slice whole grain bread  1 small peeled apple    Two- 250 calories, 16 g protein  1/3 cup lowfat cottage cheese    cup cooked green beans    small fruit cocktail (in natural juice)    Three- 200 calories, 11 g protein    grilled cheese sandwich on whole grain bread with lowfat cheese  2/3rd cup of tomato soup    Four- 250 calories, 22 g protein  Deli wrap: 1 oz sliced turkey, 1 oz sliced ham, 1 oz sliced chicken rolled up with 1 slice low-fat cheese  1 small orange    Five- 250 calories, 28 g protein  2/3rd cup chili with 1 oz shredded cheese  4 saltine crackers    Six- 250  calories, 22 g protein  1 cup fresh spinach with 2 oz chicken, 1/3rd cup mandarin oranges, and 2 tablespoons sliced almonds with 1 tablespoon  vinaigrette dressing    Seven- 200 calories, 11 g protein  1 Tbsp sugar-free preserves and 1 Tbsp peanut butter on 1 slice whole grain toast    cup nonfat/lowfat Greek yogurt    Eight- 250 calories, 18 g protein  1 small soft-shell chicken taco with 1 oz shredded cheese, lettuce, tomato, salsa, and 1 Tbsp light sour cream    cup black beans    Nine- 225 calories, 13 g protein  2 ounces baked chicken  1/4 cup mashed potatoes    cup green beans    Ten- 200 calories, 21 g protein  Deli bucky: 2 oz roast beef or other deli meat with 1 tsp Tacos mayonnaise and sliced tomato, onion, and lettuce  1/3rd cup cottage cheese      Ten Dinners Under 300 calories    If you're eating a large breakfast and medium lunch, keep dinner small.  300-400 calories is ideal for most people depending on their caloric needs.    One- 300 calories, 12 g protein  1-inch thick slice of turkey meatloaf    cup baked butternut squash    Two- 200 calories, 9 g protein  Bread-less BLT: 3 slices turkey jeffers, sliced tomato, wrapped in a large lettuce leaf    cup peeled fruit    Three- 275 calories, 36 g protein  3 oz roasted chicken    cup cooked broccoli    cup shredded cheddar cheese    cup unsweetened applesauce    Four- 200 calories, 25 g protein  3 oz baked tilapia  1/3rd cup cooked carrots    cup yogurt    Five- 250 calories, 20 g protein  Grilled ham  n  Swiss: spread 2 tsp ghee or butter on 1 slice of whole grain bread.  Cut bread in half, layer 2 oz deli ham with 1 piece of Swiss cheese and grill until cheese is melted.    cup cooked vegetables    Six- 250 calories, 18 g protein  Vegetarian cheeseburger: 1 Boca cheeseburger topped with lettuce, onion, tomato, and ketchup/mustard    cup sweet potato fries    Seven- 250 calories, 18 g protein  Pork pot roast: 2 oz roasted pork loin, 1/3rd cup roasted  carrots,   medium potato, cooked with   cup gravy    Eight- 330 calories, 25 g protein  2 oz meatballs (about 2 small meatballs)    cup spaghetti sauce  1/2 piece toast topped with 1 tsp ghee or butterand topped with garlic powder, toasted in oven    Nine- 250 calories, 16 g protein  Mexican pizza: one 8  corn tortilla topped with 2 oz chicken,   cup salsa, 2 tablespoons black beans, 2 tablespoons shredded cheese.  Bake until cheese is melted.    Ten- 250 calories, 22 g protein  Shrimp stir-begum: 3 oz cooked shrimp, 1/6th onion,   pepper,   cup chopped carrots sautéed in 1 tablespoon olive oil, topped with 2 tablespoons stir begum sauce and a pinch of sesame seeds        150 Calories or Less Snack Ideas   1 hardboiled egg with   cup berries  1 small apple with 1 hardboiled egg  10 almonds with   cup berries  2 clementines with 1 light string cheese  1 light string cheese with   sliced apple  1 light string cheese wrapped in 2 slices of turkey  4 100% whole wheat crackers (e.g. Triscuit) with 1 light string cheese    c. cottage cheese with   cup fruit and 1 Tbsp sunflower seeds     cup cottage cheese with   of an avocado     can tuna fish with 1 cup sliced cucumbers     cup roasted garbanzo beans with paprika and cayenne pepper    baked sweet potato with   cup chili beans or   cup cottage cheese  2 oz. nitrate free turkey slices with 1 cup carrots  1 container (6 oz) of low sugar (less than 10 grams of sugar) greek yogurt   3 Tablespoons of hummus with 1 cup sliced bell peppers   2 Tablespoons of hummus with 15 baby carrots  4 Tablespoons ranch dip made with plain Greek Yogurt and 3 mini cucumbers  1/4 cup nuts (any kind)  1 Tablespoon peanut butter with 1 stalk celery   1 dill pickle wrapped in 1-2 slices of deli ham with 1 tsp of mayonnaise/mustard.      Meals of about 500kcal and 30-35 grams of lean protein would be ideal for anchoring your nutrition and one or two small 100-150kcal snacks with combination of complex  carbohydrate and protein would be ideal to hit your target calorie/protein ranges.       MEDICATIONS FOR WEIGHT LOSS  There are several medications available to assist us in weight loss.  By themselves, without a mindful change in diet and increase in movement/activity these medications are disappointing in their results. However, combined with a closely monitored program of diet change and exercise they can be very effective in controlling appetite and boosting initial weight loss.  All weight loss medications need continual re-evaluation for efficacy as their side effects and health benefits fail to be worthwhile if a person is not continuing to lose weight or in maintaining their healthy weight.  Some weight loss medications are scheduled drugs, meaning there is at least a theoretical possibility for developing addiction to them, but in practice this is rare.  We do anticipate coming off meds in the future- after stabilization of weight loss is assurred.  Finally, a tolerance can develop and people s perceived efficacy of medication can diminish.  In communication with your physician, it may be appropriate to intermittently take a break from these medications and then restart again (few weeks off then restart again) if a plateau is reached that cannot be broken through.  Each person can respond to a medication differently and to be a good option for you, it will need to be affordable, effective and well tolerated with minimal side effects.    In most cases, weight loss progress after one month and three months will be obtained and if a patient is not reaching the satisfactory progress towards weight loss, the medications may be discontinued.  The thought is that if a person is taking a weight loss medication and not receiving the potential health benefit of that drug, the side effects are not worthwhile and use should be discontinued.  On the flip side, there are many people on some weight loss medications for  years because it continues to be an effective tool in their weight management and they are tolerating the medication without any long-term side effects.  Each person's response and purpose will be evaluated.    PHENTERMINE (Adipex): approved in 1959 for appetite suppression.  It has stimulant effects and cannot be used with Ritalin, Concerta, or other stimulants.  Although it is not highly addictive, it's chemically related to amphetamines which are addictive and is classified as a Controlled Substance by the [place].  Occasional dependence can develop, but rarely. The most common side effects are dry mouth, increased energy and concentration, increased pulse, and constipation.  You should not take phentermine if you have glaucoma, hyperthyroidism, or uncontrolled/untreated hypertension or overly anxious. You should stop if dramatic mood swings, severe insomnia, palpations, chest pains, visual changes or if your Blood Pressure is consistently elevated or any time it's over 160/90.   It's ok to go off the med for a few weeks and restart if efficacy is wearing off.  $24-$30 for 90 tablets at [facility]. Females are required to have reliable birth control to reduce the risk birth defects/miscarriage.    TOPIRAMATE (Topamax): Anti-seizure medication, also used to prevent migraines and sometimes for mood stabilization.  Side effects include paresthesia, glaucoma, altered concentration, attention difficulties, memory and speech problems, metabolic acidosis, depression, increase in body temperature and decrease sweating, risk of kidney stones.  Do not take Topamax while taking Depakote as this can cause high ammonia levels.  You must have reliable birth control as Topamax can cause birth defects.  If prolonged use has occurred it should be tapered off slowly to avoid withdrawal issues.  Insurance usually covers Topiramate.  At higher doses, there may be some confusion/forgetfulness associated with this so we try to limit  dose to under 75mg twice daily to reduce this risk. Often covered by insurance as it's used for many reasons.  Topamax will cause carbonated beverages to taste bad. A recheck of your kidney/electrolytes may occur within a few months of starting.    QSYMIA (Phentermine + Topamax extended release):  See above information about phentermine and Topamax.  Most common side effects are paresthesia, dizziness, distortion of taste, insomnia, constipation, and dry mouth.  See above descriptions for the two individual agents.Females are required to have reliable birth control to reduce the risk birth defects/miscarriage.  $100-200 per month but coupons/programs exist at Qsymia.com that may reduce costs depending on a patient's coverage. Has  a low, medium and higher dosing option and usually titrating upwards is expected for continued good benefit and consideration for tapering downward or using lower dose in stabilization phases.    GLP1 Agonists:  Liraglutide (Victoza/Saxenda), Semaglutide (Ozempic/Wegovy):   Part of the family of Glucagon Like Peptide Agonists, these medications directly suppresses appetite and are often used by diabetic patients due to improvements in glucose/insulin balance.  In 2024, Wegovy also has been FDA approved for reducing risks of heart attacks in those with established coronary heart disease in those that are overweight or obese. These medications also slow how quickly the stomach empties, increasing fullness. They may be hard to get covered for non diabetics and some plans have exclusions for weight loss purposes.  Currently, these are  injectable medications delivered via autoinjector pen. It can be very costly without insurance coverage (over $500/month).  Due to high demand, there have been cycles of unavailability that can affect the supply or ramping up of these medications.  Small risks for pancreatitis exists and dose should be held if increased mid abdominal pain/burning. It is not to  be used if previous Multiple Endocrine Neoplasia. In rodents, may increase risk of thyroid tumors and not indicated for anyone with a history of medullary thyroid cancer as a result.  If changes in voice/swallowing should be discontinued. Reliable birth control required in women. Saxenda.[email or web address], Wegovy.com has more information on these medications.  It can take up to 6 weeks for some of these medications to get out of the body after the last injection.  Should be stopped a few weeks prior to elective surgical procedures and may require re-ramping afterwards.    Zepbound (Tirzepatide):  Similar in many ways to Wegovy/Saxenda type products, works by boosting satiety signals that improve sense of fullness/satiety, boosting both GIP and GLP1 hormones. Not to be used by those with Multiple endocrine neoplasia, medullary thyroid cancer and not likely tolerated well for those with recurrent/idiopathic pancreatitis issues. Costly without insurance coverage but very effective in curbing appetite. Currently has highest average weight reduction in clinical drug trials.  Once weekly injectable therapy. Nausea/upset stomach/constipation or diarrhea are common side effects. Heart burn can increase in some, as it slows stomach emptying speeds. Should be stopped a few weeks prior to elective surgical procedures and may require re-ramping afterwards. [email or web address] has good patient resources/information.    Contrave (Bupropion/Naltrexone).    Synergistic combination of a mild appetite suppressing anti-depressant (Bupropion) whose effects are increased due to interaction with Naltrexone.  Naltrexone may have some effects on craving and is often used in addiction medicine to help previous opiate addicts be less prone to relapse as it blocks the action of opiates. Should be stopped if any need for opiate pain medication, surgery or planned procedures where you'll be given sedation/anesthesia. If prolonged use, recommend stepping down  "bupropion over 2-3 weeks to limit any risk of withdrawal issues. Side effects may include dry mouth, increased heart rate, mild elevation in Blood pressure;  dizziness, ringing in the ears, anxiety (typically due to bupropion), nausea, constipation, and some get fatigued with naltrexone.  About $210 on [facility] for 120 tabs of \"Contrave\", the brand name without insurance coverage. Generic Bupropion 75mg: $25 for 120 tabs, Naltrexone: $55 for 90 tabs without insurance coverage on [employer]. Cannot be used if pregnant/trying to conceive or breast feeding.    Plenity:   NO LONGER AVAILABLE due to company going bankrupt. [email or web address] has more information.      Topiramate for weight loss:    Topiramate (Topamax) is used to prevent seizures and migraines. Although it's not currently FDA approved for weight loss, it has been used safely for a number of years to help people lose weight.    It seems to work on areas of the brain to quiet signals related to eating and decreases appetite and cravings.    Topiramate may make some people feel:  -less interested in eating between meals.  -think less about food/eating.  -easier to push the plate away.  -giving up soda pop easier (generally makes it taste bad).  -have more control with portions.    How to take it:    1. Start at bedtime: one tablet, 25mg, nightly for a week.  2. Increase to 2 tablets (50mg) week 2 each night.  3. Increase to 3 tablets (75mg) the 3rd week and stay on this dose until follow up if tolerated. Back off to next lower dose if not tolerating it. Discontinue if rash/mood swing/dizziness or if feeling unwell.    *Don't stop taking it if you don't think it's doing anything. It's effect can be subtle for some.    *If prolonged use for months, we recommend tapering down over several weeks to reduce the risk of withdrawal/seizures.    Do not take with sleeping pills.    Higher doses are associated with more sedation/confusion/forgetfulness so we try to limit " "those side effects by keeping the dose to 75mg twice daily maximum in most cases.    Potential Side Effects:  The most common side effects are:  -Tingling in the hands/feet or face.  -change in concentration.  -attention difficulty  -depression.  -increased body temperature.  -decreased sweating.  -increase risk of kidney stones.  -feeling sleepy/sedated tends to improve after a short time of use.    How to Store Topiramate (Topamax):  -Store in closed container at room temp away from heat/moisture.  -Keep out of the reach of children and pets.      When should I call my medical weight management team?  Call right away if yo notice any of the followin. Memory/speech problems.  2. Confusion/word finding difficulty (about 10% risk).   3. Change in vision as some glaucoma sx may worsen.  4. Nausea/vomiting feeling unwell for unclear reasons.    Contact call center if questions:  [phone].    What should I know before taking this medication?    1. Topiramate works best when you help it work:   -Decrease temptations around the house.   -stay away from situations that may trigger you.      How much activity does it take to burn off the occasional treat?  Occasional treats or indulgence doesn't have to set back your weight loss season goals. But if the occasional treat turns into the daily chocolate habit or the chips after dinner, or a can of soda, then your progress will slow dramatically unless your activity and exercise can compensate for those empty, low nutrition/high calorie foods.  Here's some estimations of walking off your snacks depending on general weights.  You can use \"calorie burn calculators\" if you search in Google, more accurate estimations should ask height/weight/gender and if you have a smart watch/heart rate monitor fitness watch, then your personal burn rate will be much more accurate then these general numbers. But these are in the ballpark.  " "http://www.Epicrisisloriecalculator.com/ftjmozrg-pjmotz-govfntjlve/ is a nice reference for many different activities with just entering weight and time spent doing anactivity.    Most pre-packaged snacks/treats or 12 oz cans of soda come in around 150 kcal (small bag of chips (11-12 chips), 2 Tollhouse Cookies, 12 oz Coke, 5 oz of wine(125 kcal), 2 oz Vodka (130kcal),etc).  To burn off this snack/indulgence, walking at a \"walking the dog\" pace or for most people that aren't in training/fitness mode, is about 2.5 MPH. If you move slower than this, you'll burn less calories. If you move faster than this,you'll burn more.     Calories burned are for a 30 minute walk, at 2.5 MPH (traveling 1.25 miles in 30 minutes):    Body Weight Calories Burned   175 lbs 120 kcal   200 lbs 137 kcal   225 lbs 153 kcal   250 lbs 171 kcal   300 lbs 204 kcal       Speed, intensity, hills and activity type (walk/bike/swim/chores/yardwork/etc) will determine how much energy you burn per hour.  As you can see, for most people, a small snack of 150 kcal is a 30 minute commitment to moving at a modest pace.  A quick stroll. In comparison, most people moving at a \"missing the bus\" walk pace of about 4 mph (or the pace that a fit person may  walk in a marathon race if they can't run anymore).    30 minutes at 4 MPH speedy walk/slow jog on level ground burn rate (2 miles covered in 30 minutes):    Body Weight Calories Burned   175 lbs 198 kcal   200 lbs 227 kcal   225 lbs 255 kcal   250 lbs 283 kcal   300 lbs 340 kcal.     Finding loops in your neighborhood is easy to map out distances by using sites like, MapMyRun.com, MapMyRide.com, or Strava.com.    Get out there and earn it, burn it, or pay it forward.  Banking calories is always a good idea if you know an occasion is coming up where you plan to indulge. The goal for all adults around the work is at [id number] minutes weekly of moderate aerobic activity like those listed above (keeping heart " rate at about 50-65% of your maximum heart rate calculation (roughly 220-Age in years (as long as not on heart slowing medicationslike Beta Blockers)).     Hopefully, this drives home the point that snacks need to be intentional during weight loss season as most of us struggle with finding 30-60minutes most days a week to exercise and it takes more than that in many cases to make up for the sweet/treats and indulgences that may have contributed to your weight gain over the years (roughly, a can of soda daily for a year will put a person at risk for a 10-15 lb weight gain each year).    Exercise Guidance    Nearly everything that bothers us gets better when the proper amount of exercise can be done regularly for what our body tolerates.  Getting to that level safely and without injury is the key.  When it comes to weight loss, exercise is especially important in maintaining the weight loss.  Unfortunately, one of the harsh realities is that substantial weight loss slows our metabolism, often anywhere from 5-20%.  But as far as importance in weight reduction, our appetite can usually easily keep up with our exericse and mindful diet for how active we are is the main determining factor of weight reduction.    Our brain always remembers our heaviest weight and we can return to that if we're not mindful and moving regularly.  Our biology doesn't understand the concept of having too much energy, only not having enough.  As such, when we lose weight, it's thought that the brain interprets this as we're ill or in a famine and dials back our metabolism to limit further weight loss.  This is why exercise is so important in keeping the weight off and is the main reason people have some weight regain from their low weight point after weight loss.  We have to make up that 10-20% of calories not being burned.Since we can restrict our intake for only so long, exercise becomes very important in our long term healthy weigh  maintenance to balance out the occasional indiscretion with our diet.    Generally, for every 5% body weight reduction in a weight loss season, a person needs to add  kilocalories of exercise in their daily routine to keep that weight off for the long term.  This is why it's vital to be starting your fitness regimen during weight loss season, so that routine is well established as you move into your maintenance period.    Additionally, all sorts of good enzymes and genes turn on with exercise and our stress, sleep, mood and bodies feel better when we can get to the point of making ourselves a little sweaty and short of breath 35-50 minutes most days of the week. But we have to start with what we can do first and give ourselves permission to work our way up to this goal.    Who isn't ready for exercise? Well, if you get severe dizziness/palpitations, chest pain or short of breath/faint with even minimal activity like walking across a room or you're having to pause while going up a flight of stairs, then getting your heart and/or lungs fully evaluated prior to starting an exercise regimen is recommended. Everyone else can probably start a program, but everyone may start at a different point:  Some can set a 5-10 minute walking goal and others will be able to ride their bike for an hour.  Some only can do chair yoga and some decide to train for a triathlon. Starting with what your body can do is the important part and not asking to do more than you can comfortably do is the goal. Building up eventually to 150 minutes/week or more is the future goal- but anything over 10 minutes/day makes a difference in how our body works and feels. Our stress/sleep/heart/brain/organ function and risks for many types of cancer improve with exercise so having that tool to assist our health goals is vital.     Start with where you're at and look to add 5-10% more each week until you're at that 150 minutes or more a week goal (or 75  minutes/week or more of vigorous exercise). Moderate exercise can be estimated as the pace you can carry on a conversation and vigorous is the pace at which you can get 3-5 words out before having to take a breath.  If you're using heart rate monitoring, Moderate is about 55-60% of your maximum heart rate and vigorous about 75%. (Max heart rate estimated as 220 beats minus your age:  Example: 220-age of 45 =176 Beats per minute (BPM) maximum. 0.6X 176= 105 BPM (moderate), 132 BMP(vigorous)).    If you like to count steps, the 10,000 steps per day does correlate well with weight maintenance, but try to make at least 20-25% of those steps at a brisk pace (like you are about to miss your bus).      Let's move!  Clark Handy MD.     High Intensity Interval Training (HIIT):    To feel our best, our bodies need to move. Our mental health, sleep, memory, immune function, stress coping and metabolic health depend on exercise and its benefits for optimizing how our body works best. In the modern world, most do not get nearly enough exercise.  However, it's important to understand that ANYTHING is better than nothing and if you can get started with a routine for fitness, even a little bit has some benefit compared to none.  The more you do, the better (up to 20 hours weekly, beyond that the benefit tails off), but the NIH guidelines for all adults in [place] is to work up to 150-200 minutes of moderate aerobic activity each week to improve our health.  If you're a person that struggles with time pressure/lack of interest then those 2.5-3 hours weekly may be too much to ask.  So, we have to be very efficient in how we exercise to reap the benefits. It turns out that INTENSITY matters. When we use Vigorous rather than Moderate aerobic activity (Vigorous defined by a heart rate of 70-85% of calculated maximum heart rate; max heart rate equals 220 beats minus your age or the level of effort where you could talk 2-4 words before  "needing to take a breath), the amount of time required to reap the benefits of exercise is cut in half:  75-90 minutes/week.      A recent study showed that a 10 minute interval workout using a 3-4 minute warm up and then 20 second \"maximum effort\" followed by 1 minute, 40 seconds of recovery and then repeated two more times was as effective in improving fitness as a 30 minute brisk walk.  They utilized exercise bikes or stairs for the effort but you could adapt to whatever geography/gym/pool/bike/hill/staircase that you may have as long as you can safely do the effort without injury.   As your fitness improves, intervals of 30 seconds to 2 minutes of increased effort followed by 1-2 minutes of recovery are options as well. The fitter you become, the harder and more intervals you'll be able to do.  Start with what you CAN do, not what you WANT to do and that will allow your body to adapt/develop fitness down the road to reach your goals.  Give yourself permission to develop a base of fitness the first 3 weeks and then you should be able to ramp up from there nicely and progressively each week.    Jumping right into a High Intensity Interval workout if you've not been having some level of exercise/fitness recently can increase your risk of injury.  To help develop some base fitness here are some options that would give you a 3-4 week base development, assuming you can walk or ride a stationary bike but haven't been doing much the last few months. 3-4 sessions each week of:      Week Warm up Interval: 3-6 repeats Cooldown   1 3-5 minutes easy walk/spin 20 seconds brisk but doable pace then recover with 90 seconds easy 2-3 minute easy walk/spin   2 3-5 minutes easy walk/spin 25 seconds brisk, 90 seconds recovery 2-3 minute easy walk/spin   3 3-5 minutes easy walk/spin 30 seconds brisk, 90 seconds recovery 2-3 minute easy walk/spin   4 3-5 minutes easy walk/spin 40 seconds brisk, 60 seconds recovery 2-3 minute easy " walk/spin     *for base development, keep resistance steady and just  the speed. Once you've completed base phase, feel free to add a little extra resistance to the faster phase to increase vigorousness/heart rate.  During base phase you should be still able to talk comfortable/sing during faster pedaling/walking.     After completing the base phase, start ramping up workouts on the bike/walking. Have one easy pace workout but of longer duration (add 2-3 minutes each week to the time) each week.  One workout weekly should have an interval workout where you push your intensity working up to those 15-30 second maximum efforts and recovering fully and then repeating for at least 3-4 intervals.  Cool down/easy pedal at the end for 1-2 minutes.     Consider a spin class if you have the means/access and remember, it's OK to rest if needed. Make the workout yours and don't focus on other people's abilities, getting started will develop your own fitness every week.  Jogging plans such as the Couch to 10k or any aerobic training program make use of similar intervals and can help you reach a fitter and more capable body regardless of where/how you perform the intervals (walking/biking/swimming/elliptical/row machine/erg arm machine, peddler, raking, lawn mowing,etc).  Go for it.

## 2025-03-13 NOTE — NURSING NOTE
Current patient location: Patient declined to provide     Is the patient currently in the state of [place]? YES    Visit mode: VIDEO    If the visit is dropped, the patient can be reconnected by:VIDEO VISIT: Text to cell phone:   Telephone Information:   Mobile [phone]       Will anyone else be joining the visit? NO  (If patient encounters technical issues they should call [phone])    Are changes needed to the allergy or medication list? No    Are refills needed on medications prescribed by this physician? Discuss with provider    Rooming Documentation:  Questionnaire(s) completed    Reason for visit: Consult    Keyshawn ALEGRE

## 2025-03-20 ENCOUNTER — VIRTUAL VISIT (OUTPATIENT)
Dept: SURGERY | Facility: CLINIC | Age: 30
End: 2025-03-20
Payer: COMMERCIAL

## 2025-03-20 DIAGNOSIS — E66.9 OBESITY (BMI 30-39.9): Primary | ICD-10-CM

## 2025-03-20 NOTE — LETTER
"3/20/2025      Ailin Calhoun  [address] [place] [id number]      Dear Colleague,    Thank you for referring your patient, Ailin Calhoun, to the [facility]. Please see a copy of my visit note below.    Ailin Calhoun is a 29 year old who is being evaluated via a billable video visit.          Medical Weight Loss Initial Diet Evaluation  Assessment:  This patient was referred by Dr. Handy for MNT as treatment for Obesity which is impacting her mental health and pain.     Ailin is presenting today for a new weight management nutrition consultation. Pt has had an initial appointment with Dr. Handy.    Weight loss medication:  Topamamx .     Personal Goals: would like to get to 180 lbs      Anthropometrics:      Initial weight: 254 lbs     BMI: There is no height or weight on file to calculate BMI.   Ideal body weight: 73.1 kg (161 lb 2.5 oz)  Adjusted ideal body weight: 89.9 kg (198 lb 4.7 oz)  Estimated RMR (Ramah-St Jeor equation):  2000 kcals x 1.2 (sedentary) = 2,400 kcals (for weight maintenance)  2000 kcals x 1.3 (light active) = 2,745 kcals (for weight maintenance)    Weight loss calorie goal:1600-1800kcal    Recommended Protein Intake:  grams of protein/day    Medical History:  Patient Active Problem List   Diagnosis     Mild persistent asthma without complication     Midline low back pain with left-sided sciatica, unspecified chronicity     Depression, unspecified depression type     Insomnia, unspecified type     Hx of abnormal cervical Pap smear     Lumbar herniated disc     Third degree perineal laceration during delivery, unspecified subtype     ADHD (attention deficit hyperactivity disorder), inattentive type      Diabetes: none  HbA1c:  No results found for: \"HGBA1C\"    Nutrition History:   Food allergies/intolerances/cultural or religous food customs: No     Weight loss history: Patient reports since seeing Dr. Fernandez " she has been prioritizing her protein, drinking more water. No longer drinking caffeine. Making more chicken.  Hx with skipping breakfast and is now prioritizing breakfast. Avoiding bread. Noted snacking has lessoned. Is tracking her calories at ~1700kcal and 100g protein   - bigger barrier in the past that interfered with weight loss was her mental health per her report  - goal is to get a breast reduction     Works nights Starting 5 am to 10 am   Wakes at 4 am, goes to bed at 8 pm       Vitamins/Mineral Supplementation: Womens 1 a day    Dietary Recall:  Breakfast: protein shake   Lunch: adult lunachable - Turkey, carrots, cheese stick  Dinner: taco bowl with brown rice, black beans, ground turkey, raymond and avocado   Typical Snacks: trail mix, beef stick, Kind bar   Overnight eating: No  Eating out: not recently     Beverages:   Water 85 oz     Exercise: 3x per week walks the treadmill 30 min at 3% incline     Nutrition Diagnosis (PES statement):     Obesity related to excessive energy intake as evidence by BMI of 34.45     Nutrition Intervention  Food and/or Nutrient Delivery   Placed emphasis on importance of developing a healthy meal routine, aiming for 3 meals a day and no snacks.  Nutrition Education   Discussed with patient how to build a meal: the importance of including a lean/low fat protein at each meal, include a source of vegetables at a minimum of lunch and dinner and limiting carbohydrate intake to 25% per meal.  Educated on sources of lean protein, portion sizes, the amount of grams found in each source. Recommend patient to aim for 20-30g protein at each meal.  Discussed the importance of adequate hydration, with emphasis on drinking 64oz of water or zero calorie beverages per day.  Nutrition Counseling   Encouraged importance of developing routine exercise for health benefits and weight loss.      Goals established by patient:   Consistency with meals, avoid calorie loading at specific meals    Continue tracking calories and protein  Target 25-30g fiber daily  Continue with great hydration goals    Handouts provided:  Intro to MWM  Fiber list     Assessment/Plan:    Pt will follow up in 6 month(s) with bariatrician and PRN month(s) with dietitian.         Video-Visit Details    Type of service:  Video Visit    Video Start Time (time video started): 10:34 am    Video End Time (time video stopped): 10:50 am    Originating Location (pt. Location): Home      Distant Location (provider location):  Off-site    Mode of Communication:  Video Conference via [facility]    Physician has received verbal consent for a Video Visit from the patient? Yes      Laura Vidal RD         Again, thank you for allowing me to participate in the care of your patient.        Sincerely,        Laura Vidal RD    Electronically signed

## 2025-03-20 NOTE — PATIENT INSTRUCTIONS
Eat Better ? Move More ? Live Well    Eat 3 nutrient-rich meals each day     Don't skip meals--it will cause you to overeat later in the day!     Eating fiber (vegetables/fruits/whole grains) and protein with meals helps you stay full longer     Choose foods with less than 10 grams of sugar and 5 grams of fat per serving to prevent excess calories and weight re-gain   Eat around the same times each day to develop a routine eating schedule    Avoid snacking unless physically hungry.   Planned snacks: 1-2 times per day and no more than 150 calories    Eat protein first    Protein helps with healing, maintaining adequate muscle mass, reducing hunger and optimizing nutritional status    Aim for  grams of protein per day   Fill up on Fiber    Fiber comes from plants--fruits, veggies, whole grains, nuts/seeds and beans    Fiber is low in calories, high in phytonutrients and helps you stay full longer    Aim for 25-35 grams per day by eating fiber with meals and snacks  Eat S-[id number]    Take 20-30 minutes to eat each meal by taking small bites, chewing foods to applesauce consistency or 20-30 times before you swallow    Eating foods too fast can delay satiety/fullness signals and increase overeating   Slow down your eating by using toddler utensils, putting your fork/spoon down between bites and not watching TV or emailing during meals!   Keep a Journal          Writing down what you eat, how you feel and when you are active helps you identify new changes to work on from week to week          Look for ways to cut 100 calories from your current diet 2-3 times per day  Drink 64 ounces of 0-Calorie drinks between meals    Water    Zero calorie Propel  or Vitamin Water      SoBe Lifewater  Zero Calories    Crystal Light , Sugar-Free Daniel-Aid , and other sugar-free lemonade or flavored calderon    Keep Caffeine to less than 300mg per day ie: 3-6oz cups coffee     Work up to 45-60 minutes of physical activity most days of  the week    Helps with losing weight and prevent regaining those extra pounds!     Do a combo of cardio (walking/water exercises) and strength training (lifting weights/Vinyasa yoga)    Avoid Mindless Eating    Be present when you eat--take note of the smell, taste and quality of your food    Make a list of alternative activities you could do to prevent eating out of boredom/stress  Go for a walk, call a friend, chew gum, paint your nails, re-organize the garage, etc      PROTEIN SOURCES    Animal Protein Sources  Type of Food Portion Grams of Protein Calories   Chicken (light meat, no skin)  3 ounces  27 140   Chicken (canned, white, packed in water)  3 ounces  15 80   Turkey (light meat, no skin)  3 ounces  24 160   Ham (lean)  3 ounces  19 118   Hamburger (lean)  3 ounces  16 150   Pork Tenderloin  3 ounces  22 125   Pork Chop  1 chop (~5 ounces) 39 226   Steak (sirloin, trimmed)  3 ounces  23 207   Game Meat (Venison, [place], Gunnison, [place])  3 ounces  21 - 26 124 - 207   Tuna (packed in water)  1 can, drained (5 ounces) 20 90   Flounder, [place]  3 ounces  15 81   Halibut, Cod  3 ounces  19 94   [place]   3 ounces  19 175   Shrimp  ~3 ounces (20 shrimp) 22 120   Scallops  ~3 ounces (7 scallops) 19 98   Lobster (steamed)  3 ounces  22 122   Imitation Seafood (ex: crabmeat)  3 ounces  6.5 81   Luncheon Meat (uncured)  4 slices  15 92   Egg (large)  1 egg 6 72   Egg White (large)  1 egg white 4 17   Liquid Egg Substitute  4 tablespoons or   cup  7 32     Dairy Protein Sources  Type of Food Portion Grams of Protein Calories   Protein Shake*  1 shake 15 - 30  90 - 180   Nonfat, Greek yogurt, flavored or plain*   (10 grams sugar or less)    cup/6 ounce container  12 - 20 80 - 140   Cottage Cheese (skim, 1%)*    cup  12 - 14 70 - 90   Ricotta Cheese (part skim)    cup  4 80   Milk (skim, 1%)  1 cup 8 90 - 110   Mozzarella String Cheese, light  1 stick  7 60   Cheddar Cheese Stick  1 stick  5 80   Shredded Cheese*    cup   6 - 7 80 - 110   Sliced Cheese*  1 slice  4 - 5 70 - 80   *Nutrition information varies by brand       Vegetarian & Vegan Protein Sources   Type of Food Portion Grams of Protein Calories   Plant-based protein shake*  1 shake 100 - 210 17 - 27   Plain or light, flavored soymilk*  1 cup  7 - 8 80 - 129   Plain or light, hemp milk  1 cup  4 101   Black, Kidney, [place], Colunga, White Beans, Lentils, Split Peas    cup  7 - 9 108 - 125   Chickpeas (Garbanzo Beans)    cup  7 135   Falafel Patties*  Three (85 g)  9 150   Edamame    cup  9  112   Tempeh*    cup  17 160   Tofu, firm*    cup  15 - 23 104 - 192   Veggie Burger*  One, 100-120 g  15 - 20 177 - 270   Seitan*  1 serving, 85 g  21 120   Franc, Flax, or Sunflower Seeds (shelled)    cup  7 204 - 209   Hemp Seeds, hulled    cup  13 224   Pumpkin Seeds    cup  11 207   Sesame Tahini  2 tablespoons 5 107   Almonds    cup  8 204   Brazil, Cashew, Claudia, Pine, Walnuts    cup  5 190 - 227   Pistachios    cup  7 186   Nut Butter (peanut, almond)*  2 tablespoons 6 - 7 190   *Nutrition information varies by brand      Carbohydrates  Carbohydrates fuel your body with glucose (sugar)--the energy your body needs so you can do your daily activities.  Carbohydrates offer an immediate source of energy for your body. They provide the fuel for your muscles and organs, such as your brain.     Types of Carbohydrates     Complex Carbohydrates are higher in fiber and keep you feeling full longer--helping you eat less.   These are found in nearly all plant-based foods and usually take longer for the body to digest.  They are most commonly found in whole-wheat bread, whole-grain pasta, brown rice, starchy vegetables,   and fruits  Refined Carbohydrates require almost NO WORK for digestion and break down into glucose more quickly   than complex carbohydrates. Refined carbohydrates are usually high in calories and low in nutrients and fiber--  eating more of these can lead to weight  gain.  Thinking about eliminating carbohydrates???  If you do not eat enough carbohydrates, the following can occur:  Fatigue  Muscle cramps  Poor mental function  Fatigue easily results from deprivation of carbohydrates, which is seen in people who fast, possibly interfering with activities of daily living.      Thinking about eliminating carbohydrates???  If you do not eat enough carbohydrates, the following can occur:  Fatigue  Muscle cramps  Poor mental function  Fatigue easily results from deprivation of carbohydrates, which is seen in people who fast, possibly interfering with activities of daily living    Carbohydrates are your body's first choice for fuel. If given a choice of several types of foods simultaneously, your body will use the energy from carbohydrates first.    What foods contain carbohydrates?  Choose the following foods containing carbohydrates (the BEST ones to eat):   Fruit-fresh, frozen, canned in their own juices  Whole grains:  Whole-wheat breads  Brown rice  Oatmeal  Whole-grain cereals  Other starchy foods containing a minimum of 3 grams (g) fiber/100 calories  The ingredient label should list whole wheat or whole grain as one of the first ingredients (bulgur, quinoa, buckwheat, millet, spelt, faro, kasha)  Milk or yogurt (a natural source of carbohydrates):  Low-fat milk  Fat-free milk  Yogurt   Beans or legumes     Starchy vegetables, raw or frozen:  Potatoes  Peas  Corn    AVOID or limit the following foods containing carbohydrates:  Refined sugars, such as in:  Candy  Desserts-ice cream, cakes, pies, brownies, frozen yogurt, sherbet/sorbet  Cookies  White flour: bread/pasta/crackers/rice/tortillas  Sugary snacks: sweetened cereal, granola bars, cereal bars, donuts, muffins, bagels  Sugary Drinks:  Fruit Juice, Smoothies  Sports Drinks  Regular Soda    What are typical serving sizes or portions?  The following are some serving and portion sizes for foods containing carbohydrates:  One  medium piece of fruit, about 4?5 ounces (oz) (-tennis ball)  1 cup (C) berries or melon    C canned fruit    C juice (100% vegetable)    C starchy vegetables, cooked or chopped  One slice whole-grain bread  ? C brown rice, quinoa, buckwheat, millet, spelt, faro, kasha    C oatmeal (dry)    C bulgur  One small tortilla (less than 6inch diameter)    C wheat germ  1 oz pretzels     C flaked cereal        Calorie-Controlled Sample Meal Plans    Examples of small healthy meals    Breakfast   Omelet made with   cup to   cup egg substitute or 2 eggs    cup chopped vegetables  1-2 tbsp. of light cheese     cup salsa  Medium banana    1 cup non-fat plain, Greek yogurt mixed with 1 cup berries and 1-2 Tbsp nuts or cereal   -3/4 cup skim or 1% cottage cheese    cup unsweetened whole-grain cereal  1/2 cup of fresh strawberries  Whole-wheat English muffin or mini bagel, 1 scrambled egg and 1 slice Swiss cheese   Small orange  Protein Bar or Shake (15-30 grams protein and 15-25 grams Carbohydrates)    cup cottage cheese, low-fat    cup fresh fruit    11 ounces of Slim Fast Low Carb (only), Daniela's Advantage, EAS Carb Control    Lunch/Dinner  2-3 slices roasted turkey breast  1 tbsp. of fat free mayonnaise  2 slices of  whole-wheat bread, Medium apple  10 baby carrots with 1 tbsp. of low-fat dip     cup water packed tuna or chicken  1 tablespoons of low-fat mayonnaise  1-2 tbsp. dill relish  1 serving of whole-grain crackers  1 cup of strawberries   6 inch turkey sub sandwich with light mayonnaise,   cup cottage cheese                                                                                                                                                      Black bean and low-fat cheese on a whole wheat tortilla with salsa and light sour cream  Grilled chicken sandwich  Tossed salad with light dressing    Baked potato with 3/4 cup of extra lean ground beef, light shredded cheese and salsa  Fresh fruit                                                  Chicken chunks with lettuce and vegetables stuffed in bucky  Steamed broccoli                                                 3 oz boneless/skinless chicken breast  1/2 cup brown rice with stir-fried vegetables    grapefruit  3 ounces of salmon, trout, or tuna  1 cup of steamed asparagus  1 small slice whole grain Italian bread  Broiled white or pink fish  3/4 cup whole wheat pasta with tomatoes  3/4 cup of roasted red peppers  3 oz. of extra lean (93/7) hamburger on a [employer] [place] Thins  Tossed salad with light dressing       Black bean or Tuscan bean soup with grated mozzarella cheese    of a flour tortilla    3 ounces of grilled pork loin with 1 tbsp. of low-sugar barbeque sauce, 1 cup of green beans seasoned with pepper  Small dinner roll or   cup of grapefruit sections    1-2 cups of torn adriana    cup of garbanzo beans or diced skinless chicken breast  5-6 cherry tomatoes  1  tbsp. of crumbled feta cheese  1 tbsp. of roasted soy nuts  1 tsp. of olive oil and 2-3 Tbsp. of balsamic or red wine vinegar  Small whole-wheat dinner roll or   cup of cut up pineapple       FIBER    Goal is 25-30g per day  +increase slowly 2-3g every few days to avoid constipation  +drink plenty of water when increasing fiber intake    Aim for the servings in each category daily:    Nuts/Seeds 1oz daily=1/4 cup, 2 Tbsp or small handful  -nuts  -hemp hearts  -faith  -flax  -nut butters    Vegetables 4 servings daily= 1 cup raw or 1/2 cup cooked  -broccoli  -onions  -asparagus  -beets  -radishes  -cauliflower  -peppers  -jicama    Fruits 1 serving daily= 1-1.5 cup  -apples  -pears  -fig  -dates (2)  -kiwi  -slightly green banana  -peach  -berries (blackberries=9g fiber per cup!)    Whole grains and beans, 1 cup daily  -black beans  -kidney beans  -garbanzo beans  -lentils  -quinoa  -edamame  -brown rice  -wild rice  -farro  -bulgur  -whole wheat

## 2025-03-20 NOTE — PROGRESS NOTES
"Ailin Calhoun is a 29 year old who is being evaluated via a billable video visit.          Medical Weight Loss Initial Diet Evaluation  Assessment:  This patient was referred by Dr. Handy for MNT as treatment for Obesity which is impacting her mental health and pain.     Ailin is presenting today for a new weight management nutrition consultation. Pt has had an initial appointment with Dr. Handy.    Weight loss medication:  Topamamx .     Personal Goals: would like to get to 180 lbs      Anthropometrics:      Initial weight: 254 lbs     BMI: There is no height or weight on file to calculate BMI.   Ideal body weight: 73.1 kg (161 lb 2.5 oz)  Adjusted ideal body weight: 89.9 kg (198 lb 4.7 oz)  Estimated RMR ([place]-St Jeor equation):  2000 kcals x 1.2 (sedentary) = 2,400 kcals (for weight maintenance)  2000 kcals x 1.3 (light active) = 2,745 kcals (for weight maintenance)    Weight loss calorie goal:1600-1800kcal    Recommended Protein Intake:  grams of protein/day    Medical History:  Patient Active Problem List   Diagnosis    Mild persistent asthma without complication    Midline low back pain with left-sided sciatica, unspecified chronicity    Depression, unspecified depression type    Insomnia, unspecified type    Hx of abnormal cervical Pap smear    Lumbar herniated disc    Third degree perineal laceration during delivery, unspecified subtype    ADHD (attention deficit hyperactivity disorder), inattentive type      Diabetes: none  HbA1c:  No results found for: \"HGBA1C\"    Nutrition History:   Food allergies/intolerances/cultural or religous food customs: No     Weight loss history: Patient reports since seeing Dr. Fernandez she has been prioritizing her protein, drinking more water. No longer drinking caffeine. Making more chicken.  Hx with skipping breakfast and is now prioritizing breakfast. Avoiding bread. Noted snacking has lessoned. Is tracking her calories at ~1700kcal and 100g protein "   - bigger barrier in the past that interfered with weight loss was her mental health per her report  - goal is to get a breast reduction     Works nights Starting 5 am to 10 am   Wakes at 4 am, goes to bed at 8 pm       Vitamins/Mineral Supplementation: Womens 1 a day    Dietary Recall:  Breakfast: protein shake   Lunch: adult lunachable - Turkey, carrots, cheese stick  Dinner: taco bowl with brown rice, black beans, ground turkey, raymond and avocado   Typical Snacks: trail mix, beef stick, Kind bar   Overnight eating: No  Eating out: not recently     Beverages:   Water 85 oz     Exercise: 3x per week walks the treadmill 30 min at 3% incline     Nutrition Diagnosis (PES statement):     Obesity related to excessive energy intake as evidence by BMI of 34.45     Nutrition Intervention  Food and/or Nutrient Delivery   Placed emphasis on importance of developing a healthy meal routine, aiming for 3 meals a day and no snacks.  Nutrition Education   Discussed with patient how to build a meal: the importance of including a lean/low fat protein at each meal, include a source of vegetables at a minimum of lunch and dinner and limiting carbohydrate intake to 25% per meal.  Educated on sources of lean protein, portion sizes, the amount of grams found in each source. Recommend patient to aim for 20-30g protein at each meal.  Discussed the importance of adequate hydration, with emphasis on drinking 64oz of water or zero calorie beverages per day.  Nutrition Counseling   Encouraged importance of developing routine exercise for health benefits and weight loss.      Goals established by patient:   Consistency with meals, avoid calorie loading at specific meals   Continue tracking calories and protein  Target 25-30g fiber daily  Continue with great hydration goals    Handouts provided:  Intro to MWM  Fiber list     Assessment/Plan:    Pt will follow up in 6 month(s) with bariatrician and PRN month(s) with dietitian.          Video-Visit Details    Type of service:  Video Visit    Video Start Time (time video started): 10:34 am    Video End Time (time video stopped): 10:50 am    Originating Location (pt. Location): Home      Distant Location (provider location):  Off-site    Mode of Communication:  Video Conference via [facility]    Physician has received verbal consent for a Video Visit from the patient? Yes      Laura Vidal RD

## 2025-04-10 ENCOUNTER — PATIENT OUTREACH (OUTPATIENT)
Dept: CARE COORDINATION | Facility: CLINIC | Age: 30
End: 2025-04-10
Payer: COMMERCIAL

## 2025-04-15 RX ORDER — ACETAMINOPHEN AND CODEINE PHOSPHATE 120; 12 MG/5ML; MG/5ML
0.35 SOLUTION ORAL DAILY
Qty: 84 TABLET | Refills: 2 | Status: SHIPPED | OUTPATIENT
Start: 2025-04-15

## 2025-04-20 ENCOUNTER — MYC REFILL (OUTPATIENT)
Dept: FAMILY MEDICINE | Facility: CLINIC | Age: 30
End: 2025-04-20
Payer: COMMERCIAL

## 2025-04-20 DIAGNOSIS — F90.0 ADHD (ATTENTION DEFICIT HYPERACTIVITY DISORDER), INATTENTIVE TYPE: ICD-10-CM

## 2025-04-21 RX ORDER — DEXTROAMPHETAMINE SACCHARATE, AMPHETAMINE ASPARTATE MONOHYDRATE, DEXTROAMPHETAMINE SULFATE AND AMPHETAMINE SULFATE 5; 5; 5; 5 MG/1; MG/1; MG/1; MG/1
20 CAPSULE, EXTENDED RELEASE ORAL DAILY
Qty: 30 CAPSULE | Refills: 0 | Status: SHIPPED | OUTPATIENT
Start: 2025-04-21

## 2025-04-21 NOTE — TELEPHONE ENCOUNTER
reviewed - last fill 3/21 - ok for refill    Lainey Tillman MD  [facility]

## 2025-05-06 DIAGNOSIS — J45.30 MILD PERSISTENT ASTHMA WITHOUT COMPLICATION: ICD-10-CM

## 2025-05-06 RX ORDER — ALBUTEROL SULFATE 90 UG/1
INHALANT RESPIRATORY (INHALATION)
Qty: 8.5 G | Refills: 0 | Status: SHIPPED | OUTPATIENT
Start: 2025-05-06

## 2025-05-19 ENCOUNTER — MYC MEDICAL ADVICE (OUTPATIENT)
Dept: FAMILY MEDICINE | Facility: CLINIC | Age: 30
End: 2025-05-19
Payer: COMMERCIAL

## 2025-05-26 DIAGNOSIS — J45.30 MILD PERSISTENT ASTHMA WITHOUT COMPLICATION: ICD-10-CM

## 2025-05-27 RX ORDER — ALBUTEROL SULFATE 90 UG/1
INHALANT RESPIRATORY (INHALATION)
Qty: 8.5 G | Refills: 3 | Status: SHIPPED | OUTPATIENT
Start: 2025-05-27

## 2025-05-27 NOTE — PROGRESS NOTES
Assessment/Plan:   Ailin is a 30 year old female here for physical     Routine adult health maintenance  Physical completed today.  Labs as below, lab orders in per Dr. Handy, patient will return at a later date for these.  Up-to-date with immunizations.  Pap smear up-to-date.  - CBC with platelets    ADHD (attention deficit hyperactivity disorder), inattentive type  Patient reports doing well on Adderall, refilled last week,  reviewed today.    Anxiety  Depression, unspecified depression type  Doing well on current Celexa dose, no changes made to regimen at this time.    Chronic back pain, unspecified back location, unspecified back pain laterality  Patient request refill of Flexeril which was provided.  - cyclobenzaprine (FLEXERIL) 5 MG tablet  Dispense: 60 tablet; Refill: 1    Lipid screening  Will check lipid panel with rest of labs.  - Lipid panel reflex to direct LDL Fasting    Hepatitis B vaccination status unknown  Hepatitis B vaccine status unknown, will check titer with rest of labs.  - Hepatitis B Surface Antibody    Restless leg syndrome  Patient reports some issues with restless legs, will check magnesium and iron levels with labs.  - Magnesium  - Iron and iron binding capacity       I have had an Advance Directives discussion with the patient.  The following high BMI interventions were performed this visit: encouragement to exercise and lifestyle education regarding diet    Follow up: 1 year for physical, sooner as needed    Lainey Tillman MD  [facility]      Subjective:     Ailin Calhoun is a 30 year old female who presents for an annual exam.     Question 5/28/2025  7:53 AM CDT - Filed by Patient   The following questionnaire should only be answered by the patient. Are you the patient? Yes        Over the last 2 weeks, how often have you been bothered by any of the following problems?    1. Little interest or pleasure in doing things Not at all   2.  Feeling down,  depressed, or hopeless Not at all   3.  Trouble falling or staying asleep, or sleeping too much Several days   4.  Feeling tired or having little energy Not at all   5.  Poor appetite or overeating Not at all   6.  Feeling bad about yourself - or that you are a failure or have let yourself or your family down Not at all   7.  Trouble concentrating on things, such as reading the newspaper or watching television Not at all   8.  Moving or speaking so slowly that other people could have noticed. Or the opposite - being so fidgety or restless that you have been moving around a lot more than usual Several days   9.  Thoughts that you would be better off dead, or of hurting yourself in some way Not at all   If you checked off any problems, how difficult have these problems made it for you to do your work, take care of things at home, or get along with other people? Not difficult at all        PHQ9 TOTAL SCORE (range: 0 - 27) 2 (Minimal depression)     Question 5/28/2025  7:55 AM CDT - Filed by Patient   In general, how would you rate your overall physical health? (!) FAIR   Do you get at least 3 servings of foods that have calcium each day (dairy, green leafy vegetables, etc)? (!) NO   Do you have a special diet? Other   If other, please elaborate: Calorie defect   How many servings of fruits and vegetables do you eat daily? (!) 2-3   On average, how many sweetened beverages do you drink each day (Examples: soda, juice, sweet tea, etc.)? Do NOT count diet or artificially sweetened beverages. 0-1   On average, how many days per week do you engage in moderate to strenuous exercise (like a brisk walk)? 3 days   On average, how many minutes do you engage in exercise at this level? 30 min   How often do you get together with friends or relatives? Three times a week   Do you see a dentist two times every year? (!) NO   Do you feel stress - tense, restless, nervous, or anxious, or unable to sleep at night because your mind is  troubled all the time - these days? To some extent   If you drink alcohol, do you typically have more than 3 drinks per day OR more than 7 drinks per week? No   Do you use any substances not prescribed by a provider, out of habit or for other reasons? No   Have you had any new sexual partners since you were last tested for sexually transmitted infections, including HIV? No   Do you have any questions about contraception (birth control) or family planning? No   Within the past 12 months, did you worry that your food would run out before you got money to buy more? No   Within the past 12 months, did the food you bought just not last and you didn t have money to get more? No   Do you have housing? (Housing is defined as stable permanent housing and does not include staying outside in a car, in a tent, in an abandoned building, in an overnight shelter, or couch-surfing.) Yes   Are you worried about losing your housing? No   Within the past 12 months, has lack of transportation kept you from medical appointments, getting your medicines, non-medical meetings or appointments, work, or from getting things that you need? No   Within the past 12 months, have you or your family members you live with been unable to get utilities (heat, electricity) when it was really needed? No     Question 5/28/2025  7:57 AM CDT - Filed by Patient   1. In the past 4 weeks, how much of the time did your asthma keep you from getting as much done at work, school or at home? None of the time   2. During the past 4 weeks, how often have you had shortness of breath? Once or twice a week   3. During the past 4 weeks, how often did your asthma symptoms (wheezing, coughing, shortness of breath, chest tightness or pain) wake you up at night or earlier than usual in the morning? Once or twice   4. During the past 4 weeks, how often have you used your rescue inhaler or nebulizer medication (such as albuterol)? Once a week or less   5. How would you rate  your asthma control during the past 4 weeks? Somewhat controlled   Asthma Control Test Total Score (range: 5 - 25) 20     Question 5/28/2025  7:57 AM CDT - Filed by Patient   In the past 12 months, how many times did you visit the emergency room for your asthma without being admitted to the hospital? None   In the past 12 months, how many times were you hospitalized overnight because of your asthma? None     Adderall has been helping -  reviewed last 5/23  Mood doing ok on celexa 30mg  Started topamax with Dr Handy  Restless leg issue    Health Maintenance reviewed:  Lipid Profile: yes  Glucose Screen: yes  Colonoscopy: no  Mammogram: no    Gynecologic History  Patient's last menstrual period was 04/29/2025.  Contraception: oral contraceptive  Last Pap: 2023. Results were: nml - due in 3 yrs    Immunization History   Administered Date(s) Administered    Influenza Vaccine >6 months,quad, PF 11/09/2022    TDAP (Adacel,Boostrix) 09/28/2022     Immunization status: up to date and documented.    Current Outpatient Medications   Medication Sig Dispense Refill    albuterol (PROAIR HFA/PROVENTIL HFA/VENTOLIN HFA) 108 (90 Base) MCG/ACT inhaler INHALE 2 PUFFS INTO THE LUNGS EVERY 4 HOURS AS NEEDED FOR SHORTNESS OF BREATH OR WHEEZING OR COUGH 8.5 g 3    albuterol (PROVENTIL) (2.5 MG/3ML) 0.083% neb solution Take 2 vials (5 mg) by nebulization every 6 hours as needed for shortness of breath / dyspnea or wheezing 75 mL 0    amphetamine-dextroamphetamine (ADDERALL XR) 20 MG 24 hr capsule Take 1 capsule (20 mg) by mouth daily. 30 capsule 0    citalopram (CELEXA) 10 MG tablet TAKE 1 TABLET(10 MG) BY MOUTH DAILY 90 tablet 3    citalopram (CELEXA) 20 MG tablet TAKE 1 TABLET(20 MG) BY MOUTH DAILY 90 tablet 3    cyclobenzaprine (FLEXERIL) 5 MG tablet Take 1-2 tablets (5-10 mg) by mouth 3 times daily as needed for muscle spasms. 60 tablet 1    montelukast (SINGULAIR) 10 MG tablet TAKE 1 TABLET(10 MG) BY MOUTH AT BEDTIME 90 tablet  3    norethindrone (MICRONOR) 0.35 MG tablet TAKE 1 TABLET BY MOUTH EVERY DAY 84 tablet 2    topiramate (TOPAMAX) 25 MG tablet Start one tablet with supper for 2 weeks then increase to 2 tablets with supper if tolerated (50mg). 180 tablet 1     Past Medical History:   Diagnosis Date    Asthma     Depressive disorder     Third-stage postpartum hemorrhage 12/4/2022    Total 850 ml 3 degree tear, started iron supplement , postpartum hemorrhage protocol started      Past Surgical History:   Procedure Laterality Date    HEMILAMINECTOMY, DISCECTOMY LUMBAR ONE LEVEL, COMBINED N/A 9/17/2022    Procedure: HEMILAMINECTOMY, SPINE, LUMBAR, 1 LEVEL, WITH DISCECTOMY;  Surgeon: Barrie Owen MD;  Location: [facility] OR    LAMINECTOMY LUMBAR ONE LEVEL N/A 9/17/2022    Procedure: LAMINECTOMY, SPINE, LUMBAR, 1 LEVEL;  Surgeon: Barrie Owen MD;  Location: [facility] OR     Aspirin, Ibuprofen, Aspirin, and Ibuprofen  Family History   Problem Relation Age of Onset    Depression Mother     Obesity Mother     Anxiety Disorder Mother     No Known Problems Father     Anxiety Disorder Maternal Grandmother     Depression Maternal Grandmother     Obesity Maternal Grandmother     No Known Problems Maternal Grandfather     No Known Problems Paternal Grandmother     No Known Problems Paternal Grandfather     Depression Sister     Anxiety Disorder Sister     Asthma Sister     Attention Deficit Disorder Sister      Social History     Socioeconomic History    Marital status:      Spouse name: Not on file    Number of children: Not on file    Years of education: Not on file    Highest education level: Not on file   Occupational History    Not on file   Tobacco Use    Smoking status: Never    Smokeless tobacco: Never   Vaping Use    Vaping status: Never Used   Substance and Sexual Activity    Alcohol use: Not Currently    Drug use: Never    Sexual activity: Yes     Partners: Female     Birth control/protection: Pill, None   Other Topics  Concern    Not on file   Social History Narrative    ** Merged History Encounter **          , No children  Nilton Pantoja MD         Social Drivers of Health     Financial Resource Strain: Low Risk  (5/28/2025)    Financial Resource Strain     Within the past 12 months, have you or your family members you live with been unable to get utilities (heat, electricity) when it was really needed?: No   Food Insecurity: Low Risk  (5/28/2025)    Food Insecurity     Within the past 12 months, did you worry that your food would run out before you got money to buy more?: No     Within the past 12 months, did the food you bought just not last and you didn t have money to get more?: No   Transportation Needs: Low Risk  (5/28/2025)    Transportation Needs     Within the past 12 months, has lack of transportation kept you from medical appointments, getting your medicines, non-medical meetings or appointments, work, or from getting things that you need?: No   Physical Activity: Insufficiently Active (5/28/2025)    Exercise Vital Sign     Days of Exercise per Week: 3 days     Minutes of Exercise per Session: 30 min   Stress: Stress Concern Present (5/28/2025)    [place] [employer] - Occupational Stress Questionnaire     Feeling of Stress : To some extent   Social Connections: Unknown (5/28/2025)    Social Connection and Isolation Panel [NHANES]     Frequency of Communication with Friends and Family: Not on file     Frequency of Social Gatherings with Friends and Family: Three times a week     Attends L[employer] day Services: Not on file     Active Member of Clubs or Organizations: Not on file     Attends Club or Organization Meetings: Not on file     Marital Status: Not on file   Interpersonal Safety: Low Risk  (5/29/2025)    Interpersonal Safety     Do you feel physically and emotionally safe where you currently live?: Yes     Within the past 12 months, have you been hit, slapped, kicked or otherwise  physically hurt by someone?: No     Within the past 12 months, have you been humiliated or emotionally abused in other ways by your partner or ex-partner?: No   Housing Stability: Low Risk  (5/28/2025)    Housing Stability     Do you have housing? : Yes     Are you worried about losing your housing?: No       Review of Systems negative unless noted    Objective:        Vitals:    05/29/25 0924   BP: 121/74   Pulse: 80   Resp: 18   Temp: 97.6  F (36.4  C)   TempSrc: Temporal   SpO2: 100%   Weight: 111.1 kg (245 lb)   Height: 1.829 m (6')     Body mass index is 33.23 kg/m .    Physical Exam:  General Appearance: Alert, pleasant, appears stated age  Head: Normocephalic, without obvious abnormality  Eyes: PERRL, conjunctiva/corneas clear, EOM's intact  Ears: Normal TM's and external ear canals, both ears  Nose: Nares normal, septum midline,mucosa normal, no drainage  Throat: Lips, mucosa, and tongue normal; teeth and gums normal; oropharynx is clear  Neck: Supple,without lymphadenopathy, no thyromegaly or nodules noted  Lungs: Clear to auscultation bilaterally, respirations unlabored, no wheezing or crackles  Heart: Regular rate and rhythm, no murmur   Abdomen: Soft, non-tender, no masses, no organomegaly  Extremities: Extremities with strong and symmetric pulses, no cyanosis or edema  Skin: Skin color, texture normal, no rashes or lesions  Neurologic: Grossly normal, no focal deficits

## 2025-05-28 SDOH — HEALTH STABILITY: PHYSICAL HEALTH: ON AVERAGE, HOW MANY DAYS PER WEEK DO YOU ENGAGE IN MODERATE TO STRENUOUS EXERCISE (LIKE A BRISK WALK)?: 3 DAYS

## 2025-05-28 SDOH — HEALTH STABILITY: PHYSICAL HEALTH: ON AVERAGE, HOW MANY MINUTES DO YOU ENGAGE IN EXERCISE AT THIS LEVEL?: 30 MIN

## 2025-05-28 ASSESSMENT — ASTHMA QUESTIONNAIRES
QUESTION_1 LAST FOUR WEEKS HOW MUCH OF THE TIME DID YOUR ASTHMA KEEP YOU FROM GETTING AS MUCH DONE AT WORK, SCHOOL OR AT HOME: NONE OF THE TIME
QUESTION_5 LAST FOUR WEEKS HOW WOULD YOU RATE YOUR ASTHMA CONTROL: SOMEWHAT CONTROLLED
QUESTION_2 LAST FOUR WEEKS HOW OFTEN HAVE YOU HAD SHORTNESS OF BREATH: ONCE OR TWICE A WEEK
QUESTION_4 LAST FOUR WEEKS HOW OFTEN HAVE YOU USED YOUR RESCUE INHALER OR NEBULIZER MEDICATION (SUCH AS ALBUTEROL): ONCE A WEEK OR LESS
QUESTION_3 LAST FOUR WEEKS HOW OFTEN DID YOUR ASTHMA SYMPTOMS (WHEEZING, COUGHING, SHORTNESS OF BREATH, CHEST TIGHTNESS OR PAIN) WAKE YOU UP AT NIGHT OR EARLIER THAN USUAL IN THE MORNING: ONCE OR TWICE
ACT_TOTALSCORE: 20

## 2025-05-28 ASSESSMENT — PATIENT HEALTH QUESTIONNAIRE - PHQ9
SUM OF ALL RESPONSES TO PHQ QUESTIONS 1-9: 2
10. IF YOU CHECKED OFF ANY PROBLEMS, HOW DIFFICULT HAVE THESE PROBLEMS MADE IT FOR YOU TO DO YOUR WORK, TAKE CARE OF THINGS AT HOME, OR GET ALONG WITH OTHER PEOPLE: NOT DIFFICULT AT ALL
SUM OF ALL RESPONSES TO PHQ QUESTIONS 1-9: 2

## 2025-05-28 ASSESSMENT — SOCIAL DETERMINANTS OF HEALTH (SDOH): HOW OFTEN DO YOU GET TOGETHER WITH FRIENDS OR RELATIVES?: THREE TIMES A WEEK

## 2025-05-29 ENCOUNTER — OFFICE VISIT (OUTPATIENT)
Dept: FAMILY MEDICINE | Facility: CLINIC | Age: 30
End: 2025-05-29
Payer: COMMERCIAL

## 2025-05-29 VITALS
TEMPERATURE: 97.6 F | WEIGHT: 245 LBS | RESPIRATION RATE: 18 BRPM | HEIGHT: 72 IN | DIASTOLIC BLOOD PRESSURE: 74 MMHG | SYSTOLIC BLOOD PRESSURE: 121 MMHG | HEART RATE: 80 BPM | OXYGEN SATURATION: 100 % | BODY MASS INDEX: 33.18 KG/M2

## 2025-05-29 DIAGNOSIS — Z78.9 HEPATITIS B VACCINATION STATUS UNKNOWN: ICD-10-CM

## 2025-05-29 DIAGNOSIS — G89.29 CHRONIC BACK PAIN, UNSPECIFIED BACK LOCATION, UNSPECIFIED BACK PAIN LATERALITY: ICD-10-CM

## 2025-05-29 DIAGNOSIS — G25.81 RESTLESS LEG SYNDROME: ICD-10-CM

## 2025-05-29 DIAGNOSIS — F90.0 ADHD (ATTENTION DEFICIT HYPERACTIVITY DISORDER), INATTENTIVE TYPE: ICD-10-CM

## 2025-05-29 DIAGNOSIS — F41.9 ANXIETY: ICD-10-CM

## 2025-05-29 DIAGNOSIS — Z13.220 LIPID SCREENING: ICD-10-CM

## 2025-05-29 DIAGNOSIS — F32.A DEPRESSION, UNSPECIFIED DEPRESSION TYPE: ICD-10-CM

## 2025-05-29 DIAGNOSIS — M54.9 CHRONIC BACK PAIN, UNSPECIFIED BACK LOCATION, UNSPECIFIED BACK PAIN LATERALITY: ICD-10-CM

## 2025-05-29 DIAGNOSIS — Z00.00 ROUTINE ADULT HEALTH MAINTENANCE: Primary | ICD-10-CM

## 2025-05-29 RX ORDER — CYCLOBENZAPRINE HCL 5 MG
5-10 TABLET ORAL 3 TIMES DAILY PRN
Qty: 60 TABLET | Refills: 1 | Status: SHIPPED | OUTPATIENT
Start: 2025-05-29

## 2025-06-23 ENCOUNTER — MYC REFILL (OUTPATIENT)
Dept: FAMILY MEDICINE | Facility: CLINIC | Age: 30
End: 2025-06-23
Payer: COMMERCIAL

## 2025-06-23 DIAGNOSIS — F90.0 ADHD (ATTENTION DEFICIT HYPERACTIVITY DISORDER), INATTENTIVE TYPE: ICD-10-CM

## 2025-06-23 RX ORDER — DEXTROAMPHETAMINE SACCHARATE, AMPHETAMINE ASPARTATE MONOHYDRATE, DEXTROAMPHETAMINE SULFATE AND AMPHETAMINE SULFATE 5; 5; 5; 5 MG/1; MG/1; MG/1; MG/1
20 CAPSULE, EXTENDED RELEASE ORAL DAILY
Qty: 30 CAPSULE | Refills: 0 | Status: SHIPPED | OUTPATIENT
Start: 2025-06-23

## 2025-07-28 ENCOUNTER — MYC REFILL (OUTPATIENT)
Dept: FAMILY MEDICINE | Facility: CLINIC | Age: 30
End: 2025-07-28
Payer: COMMERCIAL

## 2025-07-28 DIAGNOSIS — F90.0 ADHD (ATTENTION DEFICIT HYPERACTIVITY DISORDER), INATTENTIVE TYPE: ICD-10-CM

## 2025-07-28 RX ORDER — DEXTROAMPHETAMINE SACCHARATE, AMPHETAMINE ASPARTATE MONOHYDRATE, DEXTROAMPHETAMINE SULFATE AND AMPHETAMINE SULFATE 5; 5; 5; 5 MG/1; MG/1; MG/1; MG/1
20 CAPSULE, EXTENDED RELEASE ORAL DAILY
Qty: 30 CAPSULE | Refills: 0 | Status: SHIPPED | OUTPATIENT
Start: 2025-07-28

## 2025-08-11 DIAGNOSIS — J45.30 MILD PERSISTENT ASTHMA WITHOUT COMPLICATION: ICD-10-CM

## 2025-08-11 RX ORDER — ALBUTEROL SULFATE 90 UG/1
INHALANT RESPIRATORY (INHALATION)
Qty: 8.5 G | Refills: 2 | Status: SHIPPED | OUTPATIENT
Start: 2025-08-11

## (undated) DEVICE — NDL 18GA 1.5" 305196

## (undated) DEVICE — DRSG ADAPTIC 3X8" 6113

## (undated) DEVICE — DRAPE STERI TOWEL LG 1010

## (undated) DEVICE — DRSG TEGADERM 2 3/8X2 3/4" 1624W

## (undated) DEVICE — SOL NACL 0.9% IRRIG 3000ML BAG 2B7477

## (undated) DEVICE — DRAPE MAYO STAND 23X54 8337

## (undated) DEVICE — LIGHT HANDLE X2

## (undated) DEVICE — Device

## (undated) DEVICE — SYR 20ML LL W/O NDL 302830

## (undated) DEVICE — SPONGE SURGIFOAM 12 1972

## (undated) DEVICE — SPONGE COTTONOID 3/4X3/4" 80-1401

## (undated) DEVICE — LINEN BACK PACK 5440

## (undated) DEVICE — LABEL MEDICATION SYSTEM 3303-P

## (undated) DEVICE — LINEN DRAPE 54X72" 5467

## (undated) DEVICE — SOL NACL 0.9% IRRIG 1000ML BOTTLE 2F7124

## (undated) DEVICE — ESU GROUND PAD UNIVERSAL W/O CORD

## (undated) DEVICE — SPONGE COTTONOID 1/2X1" 80-1402

## (undated) DEVICE — SPONGE KITTNER 31001010

## (undated) DEVICE — SYR 03ML LL W/O NDL 309657

## (undated) DEVICE — TAPE MEDIPORE 4"X2YD 2864

## (undated) DEVICE — SPONGE COTTONOID NEURO 1/2"X1/2" 30-054

## (undated) DEVICE — NDL 22GA 1.5"

## (undated) DEVICE — TUBING SUCTION MEDI-VAC SOFT 3/16"X20' N520A

## (undated) DEVICE — ESU ELEC BLADE 6" COATED E1450-6

## (undated) DEVICE — SOL WATER IRRIG 1000ML BOTTLE 2F7114

## (undated) DEVICE — GOWN XLG DISP 9545

## (undated) DEVICE — LINEN GOWN X4 5410

## (undated) DEVICE — SYR 10ML LL W/O NDL 302995

## (undated) DEVICE — DRSG KERLIX 4 1/2"X4YDS ROLL 6730

## (undated) DEVICE — PREP SKIN SCRUB TRAY 4461A

## (undated) DEVICE — SPONGE RAY-TEC 4X8" 7318

## (undated) DEVICE — NDL SPINAL 25GA 3.5" QUINCKE 405180

## (undated) DEVICE — DRSG GAUZE 4X8" NON21842

## (undated) DEVICE — CATH TRAY FOLEY SURESTEP 16FR WDRAIN BAG STLK LATEX A300316A

## (undated) DEVICE — LINEN TOWEL PACK X5 5464

## (undated) DEVICE — SOL ISOPROPYL RUBBING ALCOHOL USP 70% 4OZ HDX-20 I0020

## (undated) RX ORDER — KETOROLAC TROMETHAMINE 30 MG/ML
INJECTION, SOLUTION INTRAMUSCULAR; INTRAVENOUS
Status: DISPENSED
Start: 2022-09-17

## (undated) RX ORDER — METOCLOPRAMIDE HYDROCHLORIDE 5 MG/ML
INJECTION INTRAMUSCULAR; INTRAVENOUS
Status: DISPENSED
Start: 2022-09-17

## (undated) RX ORDER — BUPIVACAINE HYDROCHLORIDE 2.5 MG/ML
INJECTION, SOLUTION EPIDURAL; INFILTRATION; INTRACAUDAL
Status: DISPENSED
Start: 2022-09-17

## (undated) RX ORDER — IPRATROPIUM BROMIDE AND ALBUTEROL SULFATE 2.5; .5 MG/3ML; MG/3ML
SOLUTION RESPIRATORY (INHALATION)
Status: DISPENSED
Start: 2022-09-17

## (undated) RX ORDER — CEFAZOLIN SODIUM 1 G/3ML
INJECTION, POWDER, FOR SOLUTION INTRAMUSCULAR; INTRAVENOUS
Status: DISPENSED
Start: 2022-09-17

## (undated) RX ORDER — CITRIC ACID/SODIUM CITRATE 334-500MG
SOLUTION, ORAL ORAL
Status: DISPENSED
Start: 2022-09-17

## (undated) RX ORDER — FENTANYL CITRATE 50 UG/ML
INJECTION, SOLUTION INTRAMUSCULAR; INTRAVENOUS
Status: DISPENSED
Start: 2022-09-17

## (undated) RX ORDER — PROPOFOL 10 MG/ML
INJECTION, EMULSION INTRAVENOUS
Status: DISPENSED
Start: 2022-09-17

## (undated) RX ORDER — HYDROMORPHONE HYDROCHLORIDE 1 MG/ML
INJECTION, SOLUTION INTRAMUSCULAR; INTRAVENOUS; SUBCUTANEOUS
Status: DISPENSED
Start: 2022-09-17

## (undated) RX ORDER — SODIUM CHLORIDE, SODIUM LACTATE, POTASSIUM CHLORIDE, CALCIUM CHLORIDE 600; 310; 30; 20 MG/100ML; MG/100ML; MG/100ML; MG/100ML
INJECTION, SOLUTION INTRAVENOUS
Status: DISPENSED
Start: 2022-09-17

## (undated) RX ORDER — ACETAMINOPHEN 325 MG/1
TABLET ORAL
Status: DISPENSED
Start: 2022-09-17

## (undated) RX ORDER — BUPIVACAINE HYDROCHLORIDE AND EPINEPHRINE 2.5; 5 MG/ML; UG/ML
INJECTION, SOLUTION EPIDURAL; INFILTRATION; INTRACAUDAL; PERINEURAL
Status: DISPENSED
Start: 2022-09-17

## (undated) RX ORDER — CEFAZOLIN SODIUM/WATER 2 G/20 ML
SYRINGE (ML) INTRAVENOUS
Status: DISPENSED
Start: 2022-09-17